# Patient Record
Sex: FEMALE | Race: WHITE | NOT HISPANIC OR LATINO | Employment: FULL TIME | URBAN - METROPOLITAN AREA
[De-identification: names, ages, dates, MRNs, and addresses within clinical notes are randomized per-mention and may not be internally consistent; named-entity substitution may affect disease eponyms.]

---

## 2017-01-04 ENCOUNTER — OFFICE VISIT - HEALTHEAST (OUTPATIENT)
Dept: FAMILY MEDICINE | Facility: CLINIC | Age: 22
End: 2017-01-04

## 2017-01-04 DIAGNOSIS — B34.9 VIRAL ILLNESS: ICD-10-CM

## 2017-01-07 ENCOUNTER — COMMUNICATION - HEALTHEAST (OUTPATIENT)
Dept: FAMILY MEDICINE | Facility: CLINIC | Age: 22
End: 2017-01-07

## 2017-01-12 ENCOUNTER — COMMUNICATION - HEALTHEAST (OUTPATIENT)
Dept: FAMILY MEDICINE | Facility: CLINIC | Age: 22
End: 2017-01-12

## 2017-01-13 ENCOUNTER — COMMUNICATION - HEALTHEAST (OUTPATIENT)
Dept: FAMILY MEDICINE | Facility: CLINIC | Age: 22
End: 2017-01-13

## 2017-01-13 ENCOUNTER — AMBULATORY - HEALTHEAST (OUTPATIENT)
Dept: FAMILY MEDICINE | Facility: CLINIC | Age: 22
End: 2017-01-13

## 2017-01-13 DIAGNOSIS — J20.9 ACUTE BRONCHITIS: ICD-10-CM

## 2017-02-14 ENCOUNTER — COMMUNICATION - HEALTHEAST (OUTPATIENT)
Dept: FAMILY MEDICINE | Facility: CLINIC | Age: 22
End: 2017-02-14

## 2017-02-17 ENCOUNTER — OFFICE VISIT - HEALTHEAST (OUTPATIENT)
Dept: FAMILY MEDICINE | Facility: CLINIC | Age: 22
End: 2017-02-17

## 2017-02-17 DIAGNOSIS — Z32.02 PREGNANCY TEST NEGATIVE: ICD-10-CM

## 2017-02-17 DIAGNOSIS — N92.6 MISSED PERIOD: ICD-10-CM

## 2017-04-07 ENCOUNTER — COMMUNICATION - HEALTHEAST (OUTPATIENT)
Dept: FAMILY MEDICINE | Facility: CLINIC | Age: 22
End: 2017-04-07

## 2017-04-07 ENCOUNTER — OFFICE VISIT - HEALTHEAST (OUTPATIENT)
Dept: FAMILY MEDICINE | Facility: CLINIC | Age: 22
End: 2017-04-07

## 2017-04-07 DIAGNOSIS — R17 JAUNDICE: ICD-10-CM

## 2017-04-07 DIAGNOSIS — R50.9 FEVER: ICD-10-CM

## 2017-04-07 DIAGNOSIS — R10.9 ABDOMINAL PAIN: ICD-10-CM

## 2017-04-07 DIAGNOSIS — J02.9 SORE THROAT: ICD-10-CM

## 2017-04-07 DIAGNOSIS — D58.0 SPHEROCYTOSIS (H): ICD-10-CM

## 2017-04-07 DIAGNOSIS — Q89.01 ASPLENIA: ICD-10-CM

## 2017-04-10 ENCOUNTER — COMMUNICATION - HEALTHEAST (OUTPATIENT)
Dept: FAMILY MEDICINE | Facility: CLINIC | Age: 22
End: 2017-04-10

## 2017-04-10 LAB
HAV IGM SERPL QL IA: NEGATIVE
HBV CORE IGM SERPL QL IA: NEGATIVE
HBV SURFACE AG SERPL QL IA: NEGATIVE
HCV AB SERPL QL IA: NEGATIVE

## 2017-04-14 ENCOUNTER — COMMUNICATION - HEALTHEAST (OUTPATIENT)
Dept: FAMILY MEDICINE | Facility: CLINIC | Age: 22
End: 2017-04-14

## 2017-05-31 ENCOUNTER — COMMUNICATION - HEALTHEAST (OUTPATIENT)
Dept: SCHEDULING | Facility: CLINIC | Age: 22
End: 2017-05-31

## 2017-06-06 ENCOUNTER — OFFICE VISIT - HEALTHEAST (OUTPATIENT)
Dept: FAMILY MEDICINE | Facility: CLINIC | Age: 22
End: 2017-06-06

## 2017-06-06 DIAGNOSIS — F41.1 GAD (GENERALIZED ANXIETY DISORDER): ICD-10-CM

## 2017-06-06 DIAGNOSIS — F32.9 MAJOR DEPRESSION: ICD-10-CM

## 2017-06-07 ENCOUNTER — COMMUNICATION - HEALTHEAST (OUTPATIENT)
Dept: FAMILY MEDICINE | Facility: CLINIC | Age: 22
End: 2017-06-07

## 2017-06-13 ENCOUNTER — COMMUNICATION - HEALTHEAST (OUTPATIENT)
Dept: FAMILY MEDICINE | Facility: CLINIC | Age: 22
End: 2017-06-13

## 2017-06-14 ENCOUNTER — COMMUNICATION - HEALTHEAST (OUTPATIENT)
Dept: FAMILY MEDICINE | Facility: CLINIC | Age: 22
End: 2017-06-14

## 2017-06-14 DIAGNOSIS — F41.1 GAD (GENERALIZED ANXIETY DISORDER): ICD-10-CM

## 2017-06-15 ENCOUNTER — COMMUNICATION - HEALTHEAST (OUTPATIENT)
Dept: FAMILY MEDICINE | Facility: CLINIC | Age: 22
End: 2017-06-15

## 2017-06-18 ENCOUNTER — COMMUNICATION - HEALTHEAST (OUTPATIENT)
Dept: FAMILY MEDICINE | Facility: CLINIC | Age: 22
End: 2017-06-18

## 2017-06-19 ENCOUNTER — COMMUNICATION - HEALTHEAST (OUTPATIENT)
Dept: FAMILY MEDICINE | Facility: CLINIC | Age: 22
End: 2017-06-19

## 2017-06-21 ENCOUNTER — OFFICE VISIT - HEALTHEAST (OUTPATIENT)
Dept: FAMILY MEDICINE | Facility: CLINIC | Age: 22
End: 2017-06-21

## 2017-06-21 ENCOUNTER — AMBULATORY - HEALTHEAST (OUTPATIENT)
Dept: FAMILY MEDICINE | Facility: CLINIC | Age: 22
End: 2017-06-21

## 2017-06-21 DIAGNOSIS — F41.9 ANXIETY: ICD-10-CM

## 2017-06-21 DIAGNOSIS — F41.0 PANIC DISORDER: ICD-10-CM

## 2017-06-21 DIAGNOSIS — Q89.01 ASPLENIA: ICD-10-CM

## 2017-06-21 DIAGNOSIS — D58.0 HEREDITARY SPHEROCYTOSIS (H): ICD-10-CM

## 2017-06-21 DIAGNOSIS — F41.1 GAD (GENERALIZED ANXIETY DISORDER): ICD-10-CM

## 2017-06-21 ASSESSMENT — MIFFLIN-ST. JEOR: SCORE: 1327.63

## 2017-07-07 ENCOUNTER — COMMUNICATION - HEALTHEAST (OUTPATIENT)
Dept: FAMILY MEDICINE | Facility: CLINIC | Age: 22
End: 2017-07-07

## 2017-07-07 ENCOUNTER — AMBULATORY - HEALTHEAST (OUTPATIENT)
Dept: FAMILY MEDICINE | Facility: CLINIC | Age: 22
End: 2017-07-07

## 2017-07-12 ENCOUNTER — COMMUNICATION - HEALTHEAST (OUTPATIENT)
Dept: FAMILY MEDICINE | Facility: CLINIC | Age: 22
End: 2017-07-12

## 2017-07-14 ENCOUNTER — OFFICE VISIT - HEALTHEAST (OUTPATIENT)
Dept: FAMILY MEDICINE | Facility: CLINIC | Age: 22
End: 2017-07-14

## 2017-07-14 DIAGNOSIS — F41.9 ANXIETY: ICD-10-CM

## 2017-07-14 DIAGNOSIS — F41.0 PANIC DISORDER: ICD-10-CM

## 2017-07-14 ASSESSMENT — MIFFLIN-ST. JEOR: SCORE: 1339.88

## 2017-07-25 ENCOUNTER — COMMUNICATION - HEALTHEAST (OUTPATIENT)
Dept: FAMILY MEDICINE | Facility: CLINIC | Age: 22
End: 2017-07-25

## 2017-08-01 ENCOUNTER — COMMUNICATION - HEALTHEAST (OUTPATIENT)
Dept: FAMILY MEDICINE | Facility: CLINIC | Age: 22
End: 2017-08-01

## 2017-08-02 ENCOUNTER — OFFICE VISIT - HEALTHEAST (OUTPATIENT)
Dept: FAMILY MEDICINE | Facility: CLINIC | Age: 22
End: 2017-08-02

## 2017-08-02 DIAGNOSIS — Z81.8 FAMILY HISTORY OF BIPOLAR DISORDER: ICD-10-CM

## 2017-08-02 DIAGNOSIS — F41.9 ANXIETY: ICD-10-CM

## 2017-08-02 ASSESSMENT — MIFFLIN-ST. JEOR: SCORE: 1317.2

## 2017-08-15 ENCOUNTER — COMMUNICATION - HEALTHEAST (OUTPATIENT)
Dept: ADMINISTRATIVE | Facility: CLINIC | Age: 22
End: 2017-08-15

## 2017-08-24 ENCOUNTER — OFFICE VISIT - HEALTHEAST (OUTPATIENT)
Dept: FAMILY MEDICINE | Facility: CLINIC | Age: 22
End: 2017-08-24

## 2017-08-24 DIAGNOSIS — F41.9 ANXIETY: ICD-10-CM

## 2017-08-24 ASSESSMENT — MIFFLIN-ST. JEOR: SCORE: 1329.68

## 2017-09-13 ENCOUNTER — OFFICE VISIT - HEALTHEAST (OUTPATIENT)
Dept: FAMILY MEDICINE | Facility: CLINIC | Age: 22
End: 2017-09-13

## 2017-09-13 DIAGNOSIS — Z30.9 CONTRACEPTION MANAGEMENT: ICD-10-CM

## 2017-09-13 DIAGNOSIS — D72.829 LEUKOCYTOSIS: ICD-10-CM

## 2017-09-13 DIAGNOSIS — Z23 NEED FOR VACCINATION: ICD-10-CM

## 2017-09-13 DIAGNOSIS — Z00.129 ROUTINE INFANT OR CHILD HEALTH CHECK: ICD-10-CM

## 2017-09-13 DIAGNOSIS — F41.9 ANXIETY: ICD-10-CM

## 2017-09-13 DIAGNOSIS — R74.8 ELEVATED LIVER ENZYMES: ICD-10-CM

## 2017-09-13 DIAGNOSIS — D75.839 THROMBOCYTOSIS: ICD-10-CM

## 2017-09-13 DIAGNOSIS — N97.9 FEMALE INFERTILITY: ICD-10-CM

## 2017-09-13 DIAGNOSIS — Z13.220 LIPID SCREENING: ICD-10-CM

## 2017-09-13 LAB
CHOLEST SERPL-MCNC: 222 MG/DL
FASTING STATUS PATIENT QL REPORTED: NO
HDLC SERPL-MCNC: 56 MG/DL
LDLC SERPL CALC-MCNC: 155 MG/DL
TRIGL SERPL-MCNC: 57 MG/DL

## 2017-09-13 ASSESSMENT — MIFFLIN-ST. JEOR: SCORE: 1328.77

## 2017-09-15 ENCOUNTER — COMMUNICATION - HEALTHEAST (OUTPATIENT)
Dept: FAMILY MEDICINE | Facility: CLINIC | Age: 22
End: 2017-09-15

## 2017-09-21 ENCOUNTER — COMMUNICATION - HEALTHEAST (OUTPATIENT)
Dept: FAMILY MEDICINE | Facility: CLINIC | Age: 22
End: 2017-09-21

## 2017-09-27 ENCOUNTER — COMMUNICATION - HEALTHEAST (OUTPATIENT)
Dept: FAMILY MEDICINE | Facility: CLINIC | Age: 22
End: 2017-09-27

## 2017-09-28 ENCOUNTER — COMMUNICATION - HEALTHEAST (OUTPATIENT)
Dept: SCHEDULING | Facility: CLINIC | Age: 22
End: 2017-09-28

## 2017-10-10 ENCOUNTER — COMMUNICATION - HEALTHEAST (OUTPATIENT)
Dept: FAMILY MEDICINE | Facility: CLINIC | Age: 22
End: 2017-10-10

## 2017-10-10 DIAGNOSIS — F41.9 ANXIETY: ICD-10-CM

## 2017-10-23 ENCOUNTER — OFFICE VISIT - HEALTHEAST (OUTPATIENT)
Dept: FAMILY MEDICINE | Facility: CLINIC | Age: 22
End: 2017-10-23

## 2017-10-23 DIAGNOSIS — J01.90 ACUTE SINUSITIS: ICD-10-CM

## 2017-10-23 ASSESSMENT — MIFFLIN-ST. JEOR: SCORE: 1365.96

## 2017-10-26 ENCOUNTER — COMMUNICATION - HEALTHEAST (OUTPATIENT)
Dept: FAMILY MEDICINE | Facility: CLINIC | Age: 22
End: 2017-10-26

## 2017-10-26 ENCOUNTER — AMBULATORY - HEALTHEAST (OUTPATIENT)
Dept: FAMILY MEDICINE | Facility: CLINIC | Age: 22
End: 2017-10-26

## 2017-10-30 ENCOUNTER — OFFICE VISIT - HEALTHEAST (OUTPATIENT)
Dept: FAMILY MEDICINE | Facility: CLINIC | Age: 22
End: 2017-10-30

## 2017-10-30 DIAGNOSIS — F41.1 GAD (GENERALIZED ANXIETY DISORDER): ICD-10-CM

## 2017-10-30 DIAGNOSIS — G43.009 NONINTRACTABLE MIGRAINE: ICD-10-CM

## 2017-10-30 DIAGNOSIS — F41.0 PANIC DISORDER: ICD-10-CM

## 2017-11-08 ENCOUNTER — COMMUNICATION - HEALTHEAST (OUTPATIENT)
Dept: FAMILY MEDICINE | Facility: CLINIC | Age: 22
End: 2017-11-08

## 2017-11-08 DIAGNOSIS — F41.9 ANXIETY: ICD-10-CM

## 2017-11-09 ENCOUNTER — COMMUNICATION - HEALTHEAST (OUTPATIENT)
Dept: FAMILY MEDICINE | Facility: CLINIC | Age: 22
End: 2017-11-09

## 2017-11-09 DIAGNOSIS — F41.9 ANXIETY: ICD-10-CM

## 2017-11-10 ENCOUNTER — COMMUNICATION - HEALTHEAST (OUTPATIENT)
Dept: FAMILY MEDICINE | Facility: CLINIC | Age: 22
End: 2017-11-10

## 2017-11-10 DIAGNOSIS — F41.9 ANXIETY: ICD-10-CM

## 2017-11-13 ENCOUNTER — OFFICE VISIT - HEALTHEAST (OUTPATIENT)
Dept: FAMILY MEDICINE | Facility: CLINIC | Age: 22
End: 2017-11-13

## 2017-11-13 DIAGNOSIS — F41.9 ANXIETY: ICD-10-CM

## 2017-11-13 DIAGNOSIS — Z00.00 PREVENTATIVE HEALTH CARE: ICD-10-CM

## 2017-11-13 DIAGNOSIS — R20.0 NUMBNESS AND TINGLING IN RIGHT HAND: ICD-10-CM

## 2017-11-13 DIAGNOSIS — R20.2 NUMBNESS AND TINGLING IN RIGHT HAND: ICD-10-CM

## 2017-11-13 DIAGNOSIS — F41.1 GAD (GENERALIZED ANXIETY DISORDER): ICD-10-CM

## 2017-11-13 DIAGNOSIS — F41.0 PANIC DISORDER: ICD-10-CM

## 2017-11-14 ENCOUNTER — RECORDS - HEALTHEAST (OUTPATIENT)
Dept: ADMINISTRATIVE | Facility: OTHER | Age: 22
End: 2017-11-14

## 2017-12-04 ENCOUNTER — OFFICE VISIT - HEALTHEAST (OUTPATIENT)
Dept: FAMILY MEDICINE | Facility: CLINIC | Age: 22
End: 2017-12-04

## 2017-12-04 DIAGNOSIS — F41.0 PANIC DISORDER: ICD-10-CM

## 2017-12-04 DIAGNOSIS — D58.0 HEREDITARY SPHEROCYTOSIS (H): ICD-10-CM

## 2017-12-04 DIAGNOSIS — M25.539 WRIST PAIN: ICD-10-CM

## 2017-12-04 DIAGNOSIS — F41.1 GAD (GENERALIZED ANXIETY DISORDER): ICD-10-CM

## 2017-12-14 ENCOUNTER — COMMUNICATION - HEALTHEAST (OUTPATIENT)
Dept: FAMILY MEDICINE | Facility: CLINIC | Age: 22
End: 2017-12-14

## 2017-12-14 DIAGNOSIS — F41.9 ANXIETY: ICD-10-CM

## 2017-12-26 ENCOUNTER — OFFICE VISIT - HEALTHEAST (OUTPATIENT)
Dept: FAMILY MEDICINE | Facility: CLINIC | Age: 22
End: 2017-12-26

## 2017-12-26 DIAGNOSIS — R68.89 FLU-LIKE SYMPTOMS: ICD-10-CM

## 2017-12-26 DIAGNOSIS — R07.0 THROAT PAIN: ICD-10-CM

## 2017-12-27 ENCOUNTER — COMMUNICATION - HEALTHEAST (OUTPATIENT)
Dept: FAMILY MEDICINE | Facility: CLINIC | Age: 22
End: 2017-12-27

## 2017-12-27 DIAGNOSIS — R50.9 FEVER: ICD-10-CM

## 2017-12-27 DIAGNOSIS — F41.1 GAD (GENERALIZED ANXIETY DISORDER): ICD-10-CM

## 2017-12-27 DIAGNOSIS — F41.9 ANXIETY: ICD-10-CM

## 2018-01-24 ENCOUNTER — OFFICE VISIT - HEALTHEAST (OUTPATIENT)
Dept: FAMILY MEDICINE | Facility: CLINIC | Age: 23
End: 2018-01-24

## 2018-01-24 DIAGNOSIS — F41.1 GAD (GENERALIZED ANXIETY DISORDER): ICD-10-CM

## 2018-01-24 DIAGNOSIS — F41.9 ANXIETY: ICD-10-CM

## 2018-01-24 DIAGNOSIS — T75.3XXA MOTION SICKNESS: ICD-10-CM

## 2018-01-24 DIAGNOSIS — Z30.9 CONTRACEPTION MANAGEMENT: ICD-10-CM

## 2018-01-29 ENCOUNTER — COMMUNICATION - HEALTHEAST (OUTPATIENT)
Dept: FAMILY MEDICINE | Facility: CLINIC | Age: 23
End: 2018-01-29

## 2018-02-09 ENCOUNTER — OFFICE VISIT - HEALTHEAST (OUTPATIENT)
Dept: FAMILY MEDICINE | Facility: CLINIC | Age: 23
End: 2018-02-09

## 2018-02-09 DIAGNOSIS — N92.6 MISSED MENSES: ICD-10-CM

## 2018-02-09 LAB — HCG UR QL: POSITIVE

## 2018-02-11 ENCOUNTER — COMMUNICATION - HEALTHEAST (OUTPATIENT)
Dept: FAMILY MEDICINE | Facility: CLINIC | Age: 23
End: 2018-02-11

## 2018-02-14 ENCOUNTER — TRANSFERRED RECORDS (OUTPATIENT)
Dept: HEALTH INFORMATION MANAGEMENT | Facility: CLINIC | Age: 23
End: 2018-02-14

## 2018-02-14 ENCOUNTER — HOSPITAL ENCOUNTER (INPATIENT)
Facility: CLINIC | Age: 23
LOS: 3 days | Discharge: HOME | End: 2018-02-17
Attending: INTERNAL MEDICINE | Admitting: INTERNAL MEDICINE
Payer: COMMERCIAL

## 2018-02-14 DIAGNOSIS — R11.0 NAUSEA: Primary | ICD-10-CM

## 2018-02-14 PROBLEM — R00.0 TACHYCARDIA: Status: ACTIVE | Noted: 2018-02-14

## 2018-02-14 LAB
ALBUMIN SERPL-MCNC: 3.2 G/DL (ref 3.4–5)
ALP SERPL-CCNC: 76 U/L (ref 40–150)
ALT SERPL W P-5'-P-CCNC: 285 U/L (ref 0–50)
ANION GAP SERPL CALCULATED.3IONS-SCNC: 10 MMOL/L (ref 3–14)
AST SERPL W P-5'-P-CCNC: 486 U/L (ref 0–45)
BASOPHILS # BLD AUTO: 0 10E9/L (ref 0–0.2)
BASOPHILS NFR BLD AUTO: 0.2 %
BILIRUB SERPL-MCNC: 1.5 MG/DL (ref 0.2–1.3)
BUN SERPL-MCNC: 7 MG/DL (ref 7–30)
CALCIUM SERPL-MCNC: 8 MG/DL (ref 8.5–10.1)
CHLORIDE SERPL-SCNC: 103 MMOL/L (ref 94–109)
CO2 SERPL-SCNC: 23 MMOL/L (ref 20–32)
CREAT SERPL-MCNC: 0.51 MG/DL (ref 0.52–1.04)
DIFFERENTIAL METHOD BLD: ABNORMAL
EOSINOPHIL # BLD AUTO: 0 10E9/L (ref 0–0.7)
EOSINOPHIL NFR BLD AUTO: 0 %
ERYTHROCYTE [DISTWIDTH] IN BLOOD BY AUTOMATED COUNT: 12.6 % (ref 10–15)
GFR SERPL CREATININE-BSD FRML MDRD: >90 ML/MIN/1.7M2
GLUCOSE SERPL-MCNC: 99 MG/DL (ref 70–99)
HCT VFR BLD AUTO: 34.2 % (ref 35–47)
HGB BLD-MCNC: 12.2 G/DL (ref 11.7–15.7)
IMM GRANULOCYTES # BLD: 0.1 10E9/L (ref 0–0.4)
IMM GRANULOCYTES NFR BLD: 0.5 %
INR PPP: 1.1 (ref 0.86–1.14)
LIPASE SERPL-CCNC: 56 U/L (ref 73–393)
LYMPHOCYTES # BLD AUTO: 0.8 10E9/L (ref 0.8–5.3)
LYMPHOCYTES NFR BLD AUTO: 6.3 %
MCH RBC QN AUTO: 31 PG (ref 26.5–33)
MCHC RBC AUTO-ENTMCNC: 35.7 G/DL (ref 31.5–36.5)
MCV RBC AUTO: 87 FL (ref 78–100)
MONOCYTES # BLD AUTO: 0.7 10E9/L (ref 0–1.3)
MONOCYTES NFR BLD AUTO: 5.8 %
NEUTROPHILS # BLD AUTO: 11.1 10E9/L (ref 1.6–8.3)
NEUTROPHILS NFR BLD AUTO: 87.2 %
NRBC # BLD AUTO: 0 10*3/UL
NRBC BLD AUTO-RTO: 0 /100
PLATELET # BLD AUTO: 454 10E9/L (ref 150–450)
POTASSIUM SERPL-SCNC: 3.2 MMOL/L (ref 3.4–5.3)
PROT SERPL-MCNC: 6.8 G/DL (ref 6.8–8.8)
RBC # BLD AUTO: 3.94 10E12/L (ref 3.8–5.2)
SODIUM SERPL-SCNC: 136 MMOL/L (ref 133–144)
WBC # BLD AUTO: 12.7 10E9/L (ref 4–11)

## 2018-02-14 PROCEDURE — 86704 HEP B CORE ANTIBODY TOTAL: CPT | Performed by: STUDENT IN AN ORGANIZED HEALTH CARE EDUCATION/TRAINING PROGRAM

## 2018-02-14 PROCEDURE — 12000008 ZZH R&B INTERMEDIATE UMMC

## 2018-02-14 PROCEDURE — 36415 COLL VENOUS BLD VENIPUNCTURE: CPT | Performed by: INTERNAL MEDICINE

## 2018-02-14 PROCEDURE — 87040 BLOOD CULTURE FOR BACTERIA: CPT | Performed by: STUDENT IN AN ORGANIZED HEALTH CARE EDUCATION/TRAINING PROGRAM

## 2018-02-14 PROCEDURE — 85025 COMPLETE CBC W/AUTO DIFF WBC: CPT | Performed by: STUDENT IN AN ORGANIZED HEALTH CARE EDUCATION/TRAINING PROGRAM

## 2018-02-14 PROCEDURE — 85610 PROTHROMBIN TIME: CPT | Performed by: STUDENT IN AN ORGANIZED HEALTH CARE EDUCATION/TRAINING PROGRAM

## 2018-02-14 PROCEDURE — 25000128 H RX IP 250 OP 636: Performed by: STUDENT IN AN ORGANIZED HEALTH CARE EDUCATION/TRAINING PROGRAM

## 2018-02-14 PROCEDURE — 86706 HEP B SURFACE ANTIBODY: CPT | Performed by: STUDENT IN AN ORGANIZED HEALTH CARE EDUCATION/TRAINING PROGRAM

## 2018-02-14 PROCEDURE — 99223 1ST HOSP IP/OBS HIGH 75: CPT | Performed by: INTERNAL MEDICINE

## 2018-02-14 PROCEDURE — 83735 ASSAY OF MAGNESIUM: CPT | Performed by: STUDENT IN AN ORGANIZED HEALTH CARE EDUCATION/TRAINING PROGRAM

## 2018-02-14 PROCEDURE — 82248 BILIRUBIN DIRECT: CPT | Performed by: STUDENT IN AN ORGANIZED HEALTH CARE EDUCATION/TRAINING PROGRAM

## 2018-02-14 PROCEDURE — 99207 ZZC CDG-CHARGE REQUIRED MANUAL ENTRY: CPT | Performed by: INTERNAL MEDICINE

## 2018-02-14 PROCEDURE — 36415 COLL VENOUS BLD VENIPUNCTURE: CPT | Performed by: STUDENT IN AN ORGANIZED HEALTH CARE EDUCATION/TRAINING PROGRAM

## 2018-02-14 PROCEDURE — 86803 HEPATITIS C AB TEST: CPT | Performed by: STUDENT IN AN ORGANIZED HEALTH CARE EDUCATION/TRAINING PROGRAM

## 2018-02-14 PROCEDURE — 80329 ANALGESICS NON-OPIOID 1 OR 2: CPT | Performed by: STUDENT IN AN ORGANIZED HEALTH CARE EDUCATION/TRAINING PROGRAM

## 2018-02-14 PROCEDURE — 83615 LACTATE (LD) (LDH) ENZYME: CPT | Performed by: STUDENT IN AN ORGANIZED HEALTH CARE EDUCATION/TRAINING PROGRAM

## 2018-02-14 PROCEDURE — 87389 HIV-1 AG W/HIV-1&-2 AB AG IA: CPT | Performed by: STUDENT IN AN ORGANIZED HEALTH CARE EDUCATION/TRAINING PROGRAM

## 2018-02-14 PROCEDURE — 83605 ASSAY OF LACTIC ACID: CPT | Performed by: INTERNAL MEDICINE

## 2018-02-14 PROCEDURE — 83690 ASSAY OF LIPASE: CPT | Performed by: STUDENT IN AN ORGANIZED HEALTH CARE EDUCATION/TRAINING PROGRAM

## 2018-02-14 PROCEDURE — 87340 HEPATITIS B SURFACE AG IA: CPT | Performed by: STUDENT IN AN ORGANIZED HEALTH CARE EDUCATION/TRAINING PROGRAM

## 2018-02-14 PROCEDURE — 85045 AUTOMATED RETICULOCYTE COUNT: CPT | Performed by: STUDENT IN AN ORGANIZED HEALTH CARE EDUCATION/TRAINING PROGRAM

## 2018-02-14 PROCEDURE — 80053 COMPREHEN METABOLIC PANEL: CPT | Performed by: STUDENT IN AN ORGANIZED HEALTH CARE EDUCATION/TRAINING PROGRAM

## 2018-02-14 RX ORDER — MAGNESIUM SULFATE HEPTAHYDRATE 40 MG/ML
4 INJECTION, SOLUTION INTRAVENOUS EVERY 4 HOURS PRN
Status: DISCONTINUED | OUTPATIENT
Start: 2018-02-14 | End: 2018-02-17 | Stop reason: HOSPADM

## 2018-02-14 RX ORDER — ACETAMINOPHEN 325 MG/1
650 TABLET ORAL EVERY 4 HOURS PRN
Status: DISCONTINUED | OUTPATIENT
Start: 2018-02-14 | End: 2018-02-14

## 2018-02-14 RX ORDER — NALOXONE HYDROCHLORIDE 0.4 MG/ML
.1-.4 INJECTION, SOLUTION INTRAMUSCULAR; INTRAVENOUS; SUBCUTANEOUS
Status: DISCONTINUED | OUTPATIENT
Start: 2018-02-14 | End: 2018-02-17 | Stop reason: HOSPADM

## 2018-02-14 RX ORDER — ONDANSETRON 2 MG/ML
4 INJECTION INTRAMUSCULAR; INTRAVENOUS EVERY 6 HOURS PRN
Status: DISCONTINUED | OUTPATIENT
Start: 2018-02-14 | End: 2018-02-16

## 2018-02-14 RX ORDER — POTASSIUM CHLORIDE 1.5 G/1.58G
20-40 POWDER, FOR SOLUTION ORAL
Status: DISCONTINUED | OUTPATIENT
Start: 2018-02-14 | End: 2018-02-17 | Stop reason: HOSPADM

## 2018-02-14 RX ORDER — POTASSIUM CL/LIDO/0.9 % NACL 10MEQ/0.1L
10 INTRAVENOUS SOLUTION, PIGGYBACK (ML) INTRAVENOUS
Status: DISCONTINUED | OUTPATIENT
Start: 2018-02-14 | End: 2018-02-17 | Stop reason: HOSPADM

## 2018-02-14 RX ORDER — HYDROMORPHONE HYDROCHLORIDE 1 MG/ML
.5-1 INJECTION, SOLUTION INTRAMUSCULAR; INTRAVENOUS; SUBCUTANEOUS
Status: DISCONTINUED | OUTPATIENT
Start: 2018-02-14 | End: 2018-02-16

## 2018-02-14 RX ORDER — POTASSIUM CHLORIDE 7.45 MG/ML
10 INJECTION INTRAVENOUS
Status: DISCONTINUED | OUTPATIENT
Start: 2018-02-14 | End: 2018-02-17 | Stop reason: HOSPADM

## 2018-02-14 RX ORDER — POTASSIUM CHLORIDE 750 MG/1
20-40 TABLET, EXTENDED RELEASE ORAL
Status: DISCONTINUED | OUTPATIENT
Start: 2018-02-14 | End: 2018-02-17 | Stop reason: HOSPADM

## 2018-02-14 RX ORDER — NALOXONE HYDROCHLORIDE 0.4 MG/ML
.1-.4 INJECTION, SOLUTION INTRAMUSCULAR; INTRAVENOUS; SUBCUTANEOUS
Status: DISCONTINUED | OUTPATIENT
Start: 2018-02-14 | End: 2018-02-14

## 2018-02-14 RX ORDER — OXYCODONE HYDROCHLORIDE 5 MG/1
5 TABLET ORAL EVERY 4 HOURS PRN
Status: DISCONTINUED | OUTPATIENT
Start: 2018-02-14 | End: 2018-02-15

## 2018-02-14 RX ADMIN — Medication 0.5 MG: at 22:38

## 2018-02-14 NOTE — IP AVS SNAPSHOT
MRN:[id number]                      After Visit Summary   2/14/2018    Tonya Thayer    MRN: [id number]           Thank you!     Thank you for choosing [facility] for your care. Our goal is always to provide you with excellent care. Hearing back from our patients is one way we can continue to improve our services. Please take a few minutes to complete the written survey that you may receive in the mail after you visit with us. Thank you!        Patient Information     Date Of Birth          1995        Designated Caregiver       Most Recent Value    Caregiver    Will someone help with your care after discharge? yes    Name of designated caregiver Mohinder [he is her SO]    Phone number of caregiver [phone]    Caregiver address same as pt       About your hospital stay     You were admitted on:  February 14, 2018 You last received care in the:  [facility]    You were discharged on:  February 17, 2018        Reason for your hospital stay       Abdominal and back pain, nausea, fever, abnormal liver tests, in the setting of pregnancy.  All symptoms, as well as abnormal lab tests, have improved. It is not clear if your illness was related to a viral illness or the pregnancy.                  Who to Call     For medical emergencies, please call 911.  For non-urgent questions about your medical care, please call your primary care provider or clinic, None          Attending Provider     Provider Specialty    Oralia Sánchez MD Internal Medicine       Primary Care Provider    None Specified      After Care Instructions     Diet       Follow this diet upon discharge: Orders Placed This Encounter      Regular Diet Adult                  Follow-up Appointments     Follow Up and recommended labs and tests       See your obstetrics provider in 1 week  Call your Doctor if you experience worsening abdominal pain, fever, heavy vaginal bleeding or worsening back pain                  Pending Results   "   Date and Time Order Name Status Description    2/15/2018 0215 Drug Abuse Scr  Ur w Qnt Reflx In process     2018 Blood culture Preliminary     2018 Blood culture Preliminary             Statement of Approval     Ordered          18  I have reviewed and agree with all the recommendations and orders detailed in this document.  EFFECTIVE NOW     Approved and electronically signed by:  Luisito Painting MD           18 09  I have reviewed and agree with all the recommendations and orders detailed in this document.     Approved and electronically signed by:  Luisito Painting MD             Admission Information     Date & Time Provider Department Dept. Phone    2018 Oralia Sánchez MD [facility] [phone]      Your Vitals Were     Blood Pressure Pulse Temperature Respirations Height Weight    114/56 (BP Location: Left arm) 92 96.8  F (36  C) (Oral) 18 1.6 m (5' 3\") 68.5 kg (151 lb)    Last Period Pulse Oximetry BMI (Body Mass Index)             2017 99% 26.75 kg/m2         Online Prasad Information     Online Prasad lets you send messages to your doctor, view your test results, renew your prescriptions, schedule appointments and more. To sign up, go to [email or web address] . Click on \"Log in\" on the left side of the screen, which will take you to the Welcome page. Then click on \"Sign up Now\" on the right side of the page.     You will be asked to enter the access code listed below, as well as some personal information. Please follow the directions to create your username and password.     Your access code is: [email or web address]  Expires: 2018 11:37 AM     Your access code will  in 90 days. If you need help or a new code, please call your [facility] clinic or [phone].        Care EveryWhere ID     This is your Care EveryWhere ID. This could be used by other organizations to access your [facility] medical records  [id number]        Equal Access " to Services     [facility] : Rohit Vital, wagabrielleda luqadaha, qaybta kaalmada ivon, maddie blanchard. So [facility] [phone].    ATENCIÓN: Si habla español, tiene a carl disposición servicios gratuitos de asistencia lingüística. Llame al [phone].    We comply with applicable federal civil rights laws and [place] laws. We do not discriminate on the basis of race, color, national origin, age, disability, sex, sexual orientation, or gender identity.               Review of your medicines      START taking        Dose / Directions    ondansetron 4 MG ODT tab   Commonly known as:  ZOFRAN ODT   Used for:  Nausea        Dose:  4 mg   Take 1 tablet (4 mg) by mouth every 6 hours as needed for nausea   Quantity:  12 tablet   Refills:  1            Where to get your medicines      These medications were sent to [address] [address]     Phone:  [phone]     ondansetron 4 MG ODT tab                Protect others around you: Learn how to safely use, store and throw away your medicines at www.disposemymeds.org.             Medication List: This is a list of all your medications and when to take them. Check marks below indicate your daily home schedule. Keep this list as a reference.      Medications           Morning Afternoon Evening Bedtime As Needed    ondansetron 4 MG ODT tab   Commonly known as:  ZOFRAN ODT   Take 1 tablet (4 mg) by mouth every 6 hours as needed for nausea

## 2018-02-14 NOTE — IP AVS SNAPSHOT
[facility] [address]    [address] [place] [address]    Phone:  [phone]                                       After Visit Summary   2/14/2018    Tonya Thayer    MRN: [id number]           After Visit Summary Signature Page     I have received my discharge instructions, and my questions have been answered. I have discussed any challenges I see with this plan with the nurse or doctor.    ..........................................................................................................................................  Patient/Patient Representative Signature      ..........................................................................................................................................  Patient Representative Print Name and Relationship to Patient    ..................................................               ................................................  Date                                            Time    ..........................................................................................................................................  Reviewed by Signature/Title    ...................................................              ..............................................  Date                                                            Time

## 2018-02-15 ENCOUNTER — APPOINTMENT (OUTPATIENT)
Dept: ULTRASOUND IMAGING | Facility: CLINIC | Age: 23
End: 2018-02-15
Attending: OBSTETRICS & GYNECOLOGY
Payer: COMMERCIAL

## 2018-02-15 ENCOUNTER — APPOINTMENT (OUTPATIENT)
Dept: ULTRASOUND IMAGING | Facility: CLINIC | Age: 23
End: 2018-02-15
Attending: STUDENT IN AN ORGANIZED HEALTH CARE EDUCATION/TRAINING PROGRAM
Payer: COMMERCIAL

## 2018-02-15 LAB
ALBUMIN SERPL-MCNC: 3.1 G/DL (ref 3.4–5)
ALP SERPL-CCNC: 83 U/L (ref 40–150)
ALT SERPL W P-5'-P-CCNC: 294 U/L (ref 0–50)
ANION GAP SERPL CALCULATED.3IONS-SCNC: 11 MMOL/L (ref 3–14)
APAP SERPL-MCNC: 8 MG/L (ref 10–20)
AST SERPL W P-5'-P-CCNC: 246 U/L (ref 0–45)
BILIRUB DIRECT SERPL-MCNC: 0.5 MG/DL (ref 0–0.2)
BILIRUB DIRECT SERPL-MCNC: 0.6 MG/DL (ref 0–0.2)
BILIRUB SERPL-MCNC: 1.1 MG/DL (ref 0.2–1.3)
BUN SERPL-MCNC: 6 MG/DL (ref 7–30)
CALCIUM SERPL-MCNC: 7.7 MG/DL (ref 8.5–10.1)
CHLORIDE SERPL-SCNC: 105 MMOL/L (ref 94–109)
CO2 SERPL-SCNC: 21 MMOL/L (ref 20–32)
CREAT SERPL-MCNC: 0.44 MG/DL (ref 0.52–1.04)
GFR SERPL CREATININE-BSD FRML MDRD: >90 ML/MIN/1.7M2
GLUCOSE SERPL-MCNC: 68 MG/DL (ref 70–99)
HBV CORE AB SERPL QL IA: NONREACTIVE
HBV SURFACE AB SERPL IA-ACNC: 21.08 M[IU]/ML
HBV SURFACE AG SERPL QL IA: NONREACTIVE
HCV AB SERPL QL IA: NONREACTIVE
HIV 1+2 AB+HIV1 P24 AG SERPL QL IA: NONREACTIVE
LACTATE BLD-SCNC: 0.4 MMOL/L (ref 0.4–1.9)
LDH SERPL L TO P-CCNC: 395 U/L (ref 81–234)
LIPASE SERPL-CCNC: 66 U/L (ref 73–393)
MAGNESIUM SERPL-MCNC: 1.8 MG/DL (ref 1.6–2.3)
MAGNESIUM SERPL-MCNC: 2 MG/DL (ref 1.6–2.3)
POTASSIUM SERPL-SCNC: 3.8 MMOL/L (ref 3.4–5.3)
PROT SERPL-MCNC: 6.6 G/DL (ref 6.8–8.8)
RETICS # AUTO: 94.5 10E9/L (ref 25–95)
RETICS/RBC NFR AUTO: 2.4 % (ref 0.5–2)
SALICYLATES SERPL-MCNC: <2 MG/DL
SODIUM SERPL-SCNC: 137 MMOL/L (ref 133–144)

## 2018-02-15 PROCEDURE — 83690 ASSAY OF LIPASE: CPT | Performed by: INTERNAL MEDICINE

## 2018-02-15 PROCEDURE — 12000008 ZZH R&B INTERMEDIATE UMMC

## 2018-02-15 PROCEDURE — 76817 TRANSVAGINAL US OBSTETRIC: CPT

## 2018-02-15 PROCEDURE — 25000128 H RX IP 250 OP 636: Performed by: INTERNAL MEDICINE

## 2018-02-15 PROCEDURE — 99233 SBSQ HOSP IP/OBS HIGH 50: CPT | Performed by: INTERNAL MEDICINE

## 2018-02-15 PROCEDURE — 25000132 ZZH RX MED GY IP 250 OP 250 PS 637: Performed by: STUDENT IN AN ORGANIZED HEALTH CARE EDUCATION/TRAINING PROGRAM

## 2018-02-15 PROCEDURE — 76700 US EXAM ABDOM COMPLETE: CPT

## 2018-02-15 PROCEDURE — 80307 DRUG TEST PRSMV CHEM ANLYZR: CPT | Performed by: STUDENT IN AN ORGANIZED HEALTH CARE EDUCATION/TRAINING PROGRAM

## 2018-02-15 PROCEDURE — 36415 COLL VENOUS BLD VENIPUNCTURE: CPT | Performed by: INTERNAL MEDICINE

## 2018-02-15 PROCEDURE — 80053 COMPREHEN METABOLIC PANEL: CPT | Performed by: INTERNAL MEDICINE

## 2018-02-15 PROCEDURE — 87491 CHLMYD TRACH DNA AMP PROBE: CPT | Performed by: STUDENT IN AN ORGANIZED HEALTH CARE EDUCATION/TRAINING PROGRAM

## 2018-02-15 PROCEDURE — 25000128 H RX IP 250 OP 636: Performed by: STUDENT IN AN ORGANIZED HEALTH CARE EDUCATION/TRAINING PROGRAM

## 2018-02-15 PROCEDURE — 25000128 H RX IP 250 OP 636

## 2018-02-15 PROCEDURE — 87591 N.GONORRHOEAE DNA AMP PROB: CPT | Performed by: STUDENT IN AN ORGANIZED HEALTH CARE EDUCATION/TRAINING PROGRAM

## 2018-02-15 RX ORDER — SODIUM CHLORIDE 9 MG/ML
INJECTION, SOLUTION INTRAVENOUS
Status: COMPLETED
Start: 2018-02-15 | End: 2018-02-15

## 2018-02-15 RX ORDER — FOLIC ACID 1 MG/1
1 TABLET ORAL DAILY
Status: DISCONTINUED | OUTPATIENT
Start: 2018-02-15 | End: 2018-02-17 | Stop reason: HOSPADM

## 2018-02-15 RX ORDER — HYDROMORPHONE HYDROCHLORIDE 2 MG/1
2-4 TABLET ORAL
Status: DISCONTINUED | OUTPATIENT
Start: 2018-02-15 | End: 2018-02-17 | Stop reason: HOSPADM

## 2018-02-15 RX ORDER — SODIUM CHLORIDE 9 MG/ML
INJECTION, SOLUTION INTRAVENOUS CONTINUOUS
Status: DISCONTINUED | OUTPATIENT
Start: 2018-02-15 | End: 2018-02-17

## 2018-02-15 RX ADMIN — Medication 0.5 MG: at 00:40

## 2018-02-15 RX ADMIN — Medication 0.5 MG: at 11:13

## 2018-02-15 RX ADMIN — Medication 0.5 MG: at 12:39

## 2018-02-15 RX ADMIN — Medication 1000 ML: at 01:32

## 2018-02-15 RX ADMIN — SODIUM CHLORIDE: 9 INJECTION, SOLUTION INTRAVENOUS at 15:02

## 2018-02-15 RX ADMIN — HYDROMORPHONE HYDROCHLORIDE 2 MG: 2 TABLET ORAL at 16:38

## 2018-02-15 RX ADMIN — ONDANSETRON 4 MG: 2 INJECTION INTRAMUSCULAR; INTRAVENOUS at 03:43

## 2018-02-15 RX ADMIN — POTASSIUM CHLORIDE 20 MEQ: 750 TABLET, FILM COATED, EXTENDED RELEASE ORAL at 04:25

## 2018-02-15 RX ADMIN — Medication 0.5 MG: at 21:55

## 2018-02-15 RX ADMIN — ONDANSETRON 4 MG: 2 INJECTION INTRAMUSCULAR; INTRAVENOUS at 12:00

## 2018-02-15 RX ADMIN — Medication 0.5 MG: at 08:34

## 2018-02-15 RX ADMIN — Medication 0.5 MG: at 18:04

## 2018-02-15 RX ADMIN — Medication 0.5 MG: at 15:28

## 2018-02-15 RX ADMIN — ONDANSETRON 4 MG: 2 INJECTION INTRAMUSCULAR; INTRAVENOUS at 18:04

## 2018-02-15 RX ADMIN — Medication 0.5 MG: at 19:56

## 2018-02-15 RX ADMIN — Medication 0.5 MG: at 04:28

## 2018-02-15 RX ADMIN — POTASSIUM CHLORIDE 20 MEQ: 750 TABLET, FILM COATED, EXTENDED RELEASE ORAL at 11:13

## 2018-02-15 RX ADMIN — Medication 0.5 MG: at 14:06

## 2018-02-15 RX ADMIN — FOLIC ACID 1 MG: 1 TABLET ORAL at 11:13

## 2018-02-15 RX ADMIN — POTASSIUM CHLORIDE 40 MEQ: 750 TABLET, FILM COATED, EXTENDED RELEASE ORAL at 01:31

## 2018-02-15 RX ADMIN — SODIUM CHLORIDE 1000 ML: 9 INJECTION, SOLUTION INTRAVENOUS at 01:32

## 2018-02-15 RX ADMIN — HYDROMORPHONE HYDROCHLORIDE 4 MG: 2 TABLET ORAL at 03:43

## 2018-02-15 ASSESSMENT — ACTIVITIES OF DAILY LIVING (ADL)
ADLS_ACUITY_SCORE: 17

## 2018-02-15 NOTE — H&P
"[facility]    Internal Medicine History and Physical        Date of Admission:  2018    Chief Complaint   Back and abdominal pain    History is obtained from the patient    History of Present Illness   Tonya Thayer is a 22 year old  (9 weeks pregnant, LMP Dec 20th) female who has a history of hereditary spherocytosis s/p splenectomy () who is admitted from OSH ED for back and abdominal pain as well as nausea and vomiting.     Pt states that sx of back pain started last evening and worsening progressively overnight. Back pain described as generalized over all of lower back and wrapped around to hips and down to buttocks. She describes it as a sharp aching pain that is constant and severe (\"worse than pyelonephritis\"). No new rashes or swelling in the back area. Back pain continued to progress and she developed dizziness to point where she thought she would fall. She then started to try to eat and drink which did not help and she ended up getting nauseous and vomiting stomach contents. No bile or blood.  She had not had any significant pelvic cramping or any vaginal discharge or blood. She endorses feeling warm and flushed but denies shaking chills or drenching sweats. Denies cough or SOB. Denies pain/burning with urination. She had two small loose stools today but prior to this had not had diarrhea. No sick contacts.     She then decided to go to ER near her home. While at the ED she reported a sudden onset of severe mid to upper epigastric pain that felt like a \"popping\" sensation and lasted for 30-45 minutes. She was evaluated by ED doc and OB-GYN who performed vaginal and bimanual exam without significant findings. Transvaginal US was performed and was able to identify a probable gestational sac that was 5 weeks by size and slightly irregular in shape. No fetal pole or cardiac function was identified.      ED labs significant for mildly elevated WBC (15) but " no left shift. Plts mildly elevated at 561. UA was collected and was negative for blood, nitrites, WBCs or LE. EKG showed sinus tach. CMP was normal. INR was 1.14. bHCG was 10,169.       Review of Systems   The 10 point Review of Systems is negative other than noted in the HPI.    Past Medical History    Hereditary spherocytosis  Pyenonephritis  Asplenia      Past Surgical History   Splenectomy 2013, Cholecystectomy 2011    Social History   Lives with fiance, monogomous. Former smoker, quit April 2017. Denies current alcohol, drug or tobacco use. No other OTC meds or supplements.     Family History   Mother and MGM w/ hereditary spherocytosis and h/o blood clots  Brother also with hereditary spherocytosis    Prior to Admission Medications   Prenatal vitamin, tylenol PRN     Allergies   Allergies   Allergen Reactions     Amoxicillin      Penicillins        Physical Exam   Vital Signs: Temp: 100.3  F (37.9  C) Temp src: Oral BP: 134/57 Pulse: 129   Resp: 18 SpO2: 98 % O2 Device: None (Room air)    Weight: 0 lbs 0 oz    Gen: alert, conversational, in mild distress 2/2 back pain, NAD  HEENT: NC/AT, EOMI w/ PERRL, anicteric sclera. OP clear. MMM. Neck supple no LAD  CV: normal S1,S2 with regular tachycardia no m/r/g  Resp: lungs CTA bilaterally with adequate air movement to bases. No wheezes or crackles  Abd: soft NTND no organomegaly or masses. BS normoactive.   MSK: mod/severe pain to palpation diffusely over lower back, worse in the center and improving to the hips. No CVA tenderness.  Ext: WWP no edema or cyanosis  Skin: no concerning lesions or rashes  Neuro: A&Ox4, no lateralizing sx. Grossly nonfocal.    !HEMATOLOGY Latest Ref Rng & Units 2/14/2018   WBC 4.0 - 11.0 10e9/L 12.7 (H)   RBC 3.8 - 5.2 10e12/L 3.94   HGB 11.7 - 15.7 g/dL 12.2   HCT 35.0 - 47.0 % 34.2 (L)   MCV 78 - 100 fl 87   MCH 26.5 - 33.0 pg 31.0   MCHC 31.5 - 36.5 g/dL 35.7   RDW 10.0 - 15.0 % 12.6    - 450 10e9/L 454 (H)   % NEUTROPHILS %  87.2   % LYMPHOCYTES % 6.3   ABSOLUTE LYMPHOCYTES 0.8 - 5.3 10e9/L 0.8   ABSOLUTE MONOCYTES 0.0 - 1.3 10e9/L 0.7   % EOSINOPHILS % 0.0   ABSOLUTE EOSINOPHILS 0.0 - 0.7 10e9/L 0.0   ABSOULTE BASOPHILS 0.0 - 0.2 10e9/L 0.0   ABSOLUTE NEUTROPHIL 1.6 - 8.3 10e9/L 11.1 (H)   !COMPREHENSIVE Latest Ref Rng & Units 2018   SODIUM 133 - 144 mmol/L 136   POTASSIUM 3.4 - 5.3 mmol/L 3.2 (L)   CHLORIDE 94 - 109 mmol/L 103   BUN 7 - 30 mg/dL 7   Creatinine 0.52 - 1.04 mg/dL 0.51 (L)   Glucose 70 - 99 mg/dL 99   ANION GAP 3 - 14 mmol/L 10   CALCIUM 8.5 - 10.1 mg/dL 8.0 (L)   ALBUMIN 3.4 - 5.0 g/dL 3.2 (L)   PROTEIN, TOTAL 6.8 - 8.8 g/dL 6.8   AST 0 - 45 U/L 486 (H)   ALT 0 - 50 U/L 285 (H)   ALKPHOS 40 - 150 U/L 76   BILIRUBIN TOTAL 0.2 - 1.3 mg/dL 1.5 (H)   Lipase 56  INR 1.1      Assessment & Plan   Tonya Thayer is a 22 year old  (9 weeks pregnant, LMP Dec 20th 2017) female who has a history of hereditary spherocytosis s/p splenectomy () who is admitted from OSH ED for back and upper abdominal pain as well as nausea and vomiting and found to be meeting SIRS criteria with a transaminitis.    # SIRS - meets 2/4 SIRs criteria with tachycardia and leukocytosis. Tmax 100.3 measured here. Lactate normal. Concern for intraabdominal process given severity and rapid onset of abdominal pain but unsure if infections vs noninfectious. Pain localized primarily upper epigastric. No uterine or pelvic pain to suggest chorio or PID. Previous vaginal and bimanual exam at OSH were unrevealing. Abdomen tender but not surgical. Unfortunately, unable to obtain rapid abdominal imaging (MRI or US) tonight.     # Abdominal pain // referred back pain // transaminitis - unclear etiology. Primarily hepatocellular pattern. Pt endorses taking only minimal tylenol and level normal. HS pts prone to bile sludging and stones but pt is s/p shabnam and no elevation of alkphos. Bili elevated but mostly indirect suggesting degree of hemolysis. Hb  stable but LDH elevated. DDx broad including gastroenteritis, viral hepatitis, peptic ulcers, abdominal thrombosis, early appendicitis, missed  etc but no clear unifying diagnosis. H/o UTI/Pyelo but UA at OSH normal and asymptomatic. Pt otherwise hemodynamically stable except for mild sinus tachy related to her pain.   - RUQ U/S ordered w/ dopplers  - HBV/HCV/HIV ordered  - BCx2 drawn  - UDS  - will hold off on empiric antibiotics at this time as infectious picture note entirely clear but low threshold to start  - AM CMP to trend  - PO dilaudid 2-4mg q3h PRN with 0.5-1mg IV for breakthrough    # IUP - 9 week estimated by LMP, 5 week by U/S which was abnormal. Possible missed  at this point but too early to tell. Pt denies vaginal bleeding or discharge. bHCG uptrending 1500 --> 10k  - OB/GYN consult in AM  - Urine for C/GC by PCR    # Hereditary spherocytosis - s/p cholecystectomy and splenectomy. Normal Hb on admission with appropriately normal reticulocytes suggesting little active hemolysis at this time. However, LDH elevated, which is nonspecific in setting of liver dysfxn.   - trend Hb      Diet:  clear liquids  Fluids: NS @ 100cc/hr for 1L  DVT Prophylaxis: Low Risk/Ambulatory with no VTE prophylaxis indicated  Code Status: Full Code    Jules Jernigan MD   Hematology / Oncology Fellow  P: [phone]

## 2018-02-15 NOTE — PLAN OF CARE
Problem: Patient Care Overview  Goal: Plan of Care/Patient Progress Review  Outcome: Improving  Focus: Physio  D: Temp max 99.1. Tachycardic. Pain in abdominal area and rounds to her back. Up independently. Voiding well. Dilaudid 0.5mg given every 1-3 hours. P: Continue current plan of care. Still needs pelvic ultrasound tonight.

## 2018-02-15 NOTE — PROGRESS NOTES
Met with patient s/p u/s: findings reveal irregularly shaped gestational sac, no yolk sac, no fetal pole, minimal free fluid in pelvis, and normal adnexa bilaterally without evidence for ectopic.     Reviewed these results with patient and recommend u/s in one week to determine viability of pregnancy. U/s findings are worrisome for miscarriage but u/s not definitive at this time.     If maternal condition worsens and imaging of liver with CT or MRI is indicated recommend to proceed however if it can be avoided would do so until viability of pregnancy can be proven.     If patient is discharged she will see her primary, Dr. Michelle Medina, for pregnancy followup and evaluation. If she remains inpatient we will continue to follow the patient.     Thank you!!    Deann Doan MD  [phone]  Office [phone]  Cell [phone]

## 2018-02-15 NOTE — CONSULTS
OB-GYN consult Note    21 yo  at unsure gestational age who presented to outside hospital with complaints of severe back pain. She has had nausea and emesis but no uterine/menstrual cramping or vaginal bleeding.   She was not attempting to conceive but states she desires to continue the pregnancy if it progresses normally.     Her medical history is quite complicated and includes: hereditary spherocytosis and is s/p splenectomy and cholecystectomy and current elevation in LFTs on uncertain etiology.     Pregnancy: Bhcg levels cherelle appropriately over the past 4 days (2/10 1486 and  10,169) and u/s revealed an irregular gestational sac without yolk sac or fetal pole.     PMH and PSH also positive for anxiety, genital HSV.    Family history noncontributory  Social History recently lost her job and dog was put to sleep. Here in hospital with sig other.       Temp: 99.5  F (37.5  C) Temp src: Oral BP: 108/53 Pulse: 130   Resp: 18 SpO2: 97 % O2 Device: None (Room air)    States pain is in central low back, states she did have some abdominal pain but that's resolved.  Appears well, NAD, resting.   Abdomen: soft, NT, ND  LE without significant edema or erythema.     A: 21 yo with significant medical history now pregnant with elevated LFTs    AT her current bhcg level we would expect to see a more developed pregnancy however we cannot completely rule out a viable pregnancy at this time.   Options: repeat Bhcg to determine if it is continuing to rise and/or repeat u/s in several days to assess for pregnancy development.     One can see a transient elevation of LFTs in early pregnancy in the setting of hyperemesis.     P: Will follow with primary team     Deann Doan  [phone]    Thank you!

## 2018-02-15 NOTE — PLAN OF CARE
Problem: Patient Care Overview  Goal: Plan of Care/Patient Progress Review  Outcome: No Change  Admission to 10A    Pt arrived @ 2115 from [facility] ED via transport, able to transfer from stretcher to bed with SBA, feeling dizzy but steady gait, oriented to call light and room, obtained VS and performed routine assessment, entered appropriate admission data into computer, Low grade temp max 100.3, tachycardia present, denies SOB or CP, moderate to severe pain reported in lower back area and abdomen- IV dilaudid given as soon as MD entered order, denies numbness or tingling, no nausea reported, MD came up to assess pt, new orders acknowledged, pt's fiance is here and will spend the night, pt able to make needs known, call light in reach length, will continue to monitor and assist.

## 2018-02-16 LAB
ALBUMIN SERPL-MCNC: 2.8 G/DL (ref 3.4–5)
ALP SERPL-CCNC: 66 U/L (ref 40–150)
ALT SERPL W P-5'-P-CCNC: 181 U/L (ref 0–50)
ANION GAP SERPL CALCULATED.3IONS-SCNC: 5 MMOL/L (ref 3–14)
AST SERPL W P-5'-P-CCNC: 84 U/L (ref 0–45)
B-HCG SERPL-ACNC: ABNORMAL IU/L (ref 0–5)
BASOPHILS # BLD AUTO: 0 10E9/L (ref 0–0.2)
BASOPHILS NFR BLD AUTO: 0.6 %
BILIRUB SERPL-MCNC: 0.5 MG/DL (ref 0.2–1.3)
BUN SERPL-MCNC: 6 MG/DL (ref 7–30)
C TRACH DNA SPEC QL NAA+PROBE: NEGATIVE
CALCIUM SERPL-MCNC: 7.7 MG/DL (ref 8.5–10.1)
CHLORIDE SERPL-SCNC: 106 MMOL/L (ref 94–109)
CO2 SERPL-SCNC: 27 MMOL/L (ref 20–32)
CREAT SERPL-MCNC: 0.52 MG/DL (ref 0.52–1.04)
DIFFERENTIAL METHOD BLD: ABNORMAL
EOSINOPHIL # BLD AUTO: 0 10E9/L (ref 0–0.7)
EOSINOPHIL NFR BLD AUTO: 0.6 %
ERYTHROCYTE [DISTWIDTH] IN BLOOD BY AUTOMATED COUNT: 12.9 % (ref 10–15)
GFR SERPL CREATININE-BSD FRML MDRD: >90 ML/MIN/1.7M2
GLUCOSE SERPL-MCNC: 77 MG/DL (ref 70–99)
HCT VFR BLD AUTO: 33 % (ref 35–47)
HETEROPH AB SER QL: NEGATIVE
HGB BLD-MCNC: 11.6 G/DL (ref 11.7–15.7)
IMM GRANULOCYTES # BLD: 0 10E9/L (ref 0–0.4)
IMM GRANULOCYTES NFR BLD: 0.6 %
LYMPHOCYTES # BLD AUTO: 2.2 10E9/L (ref 0.8–5.3)
LYMPHOCYTES NFR BLD AUTO: 39.6 %
MAGNESIUM SERPL-MCNC: 2 MG/DL (ref 1.6–2.3)
MCH RBC QN AUTO: 31 PG (ref 26.5–33)
MCHC RBC AUTO-ENTMCNC: 35.2 G/DL (ref 31.5–36.5)
MCV RBC AUTO: 88 FL (ref 78–100)
MONOCYTES # BLD AUTO: 0.9 10E9/L (ref 0–1.3)
MONOCYTES NFR BLD AUTO: 16.5 %
N GONORRHOEA DNA SPEC QL NAA+PROBE: NEGATIVE
NEUTROPHILS # BLD AUTO: 2.3 10E9/L (ref 1.6–8.3)
NEUTROPHILS NFR BLD AUTO: 42.1 %
NRBC # BLD AUTO: 0 10*3/UL
NRBC BLD AUTO-RTO: 0 /100
PLATELET # BLD AUTO: 425 10E9/L (ref 150–450)
POTASSIUM SERPL-SCNC: 4.1 MMOL/L (ref 3.4–5.3)
PROT SERPL-MCNC: 6.4 G/DL (ref 6.8–8.8)
RBC # BLD AUTO: 3.74 10E12/L (ref 3.8–5.2)
SODIUM SERPL-SCNC: 138 MMOL/L (ref 133–144)
SPECIMEN SOURCE: NORMAL
SPECIMEN SOURCE: NORMAL
WBC # BLD AUTO: 5.5 10E9/L (ref 4–11)

## 2018-02-16 PROCEDURE — 86308 HETEROPHILE ANTIBODY SCREEN: CPT | Performed by: STUDENT IN AN ORGANIZED HEALTH CARE EDUCATION/TRAINING PROGRAM

## 2018-02-16 PROCEDURE — 12000001 ZZH R&B MED SURG/OB UMMC

## 2018-02-16 PROCEDURE — 36415 COLL VENOUS BLD VENIPUNCTURE: CPT | Performed by: STUDENT IN AN ORGANIZED HEALTH CARE EDUCATION/TRAINING PROGRAM

## 2018-02-16 PROCEDURE — 25000125 ZZHC RX 250: Performed by: STUDENT IN AN ORGANIZED HEALTH CARE EDUCATION/TRAINING PROGRAM

## 2018-02-16 PROCEDURE — 85025 COMPLETE CBC W/AUTO DIFF WBC: CPT | Performed by: STUDENT IN AN ORGANIZED HEALTH CARE EDUCATION/TRAINING PROGRAM

## 2018-02-16 PROCEDURE — 25000128 H RX IP 250 OP 636: Performed by: INTERNAL MEDICINE

## 2018-02-16 PROCEDURE — 25000132 ZZH RX MED GY IP 250 OP 250 PS 637: Performed by: STUDENT IN AN ORGANIZED HEALTH CARE EDUCATION/TRAINING PROGRAM

## 2018-02-16 PROCEDURE — 25000128 H RX IP 250 OP 636: Performed by: STUDENT IN AN ORGANIZED HEALTH CARE EDUCATION/TRAINING PROGRAM

## 2018-02-16 PROCEDURE — 83735 ASSAY OF MAGNESIUM: CPT | Performed by: INTERNAL MEDICINE

## 2018-02-16 PROCEDURE — 84702 CHORIONIC GONADOTROPIN TEST: CPT | Performed by: STUDENT IN AN ORGANIZED HEALTH CARE EDUCATION/TRAINING PROGRAM

## 2018-02-16 PROCEDURE — 80053 COMPREHEN METABOLIC PANEL: CPT | Performed by: STUDENT IN AN ORGANIZED HEALTH CARE EDUCATION/TRAINING PROGRAM

## 2018-02-16 PROCEDURE — 99233 SBSQ HOSP IP/OBS HIGH 50: CPT | Performed by: INTERNAL MEDICINE

## 2018-02-16 PROCEDURE — 25000132 ZZH RX MED GY IP 250 OP 250 PS 637: Performed by: INTERNAL MEDICINE

## 2018-02-16 RX ORDER — ACETAMINOPHEN 325 MG/1
650 TABLET ORAL EVERY 4 HOURS PRN
Status: DISCONTINUED | OUTPATIENT
Start: 2018-02-16 | End: 2018-02-17 | Stop reason: HOSPADM

## 2018-02-16 RX ORDER — ONDANSETRON 2 MG/ML
4 INJECTION INTRAMUSCULAR; INTRAVENOUS EVERY 4 HOURS PRN
Status: DISCONTINUED | OUTPATIENT
Start: 2018-02-16 | End: 2018-02-17 | Stop reason: HOSPADM

## 2018-02-16 RX ORDER — HYDROMORPHONE HYDROCHLORIDE 1 MG/ML
.3-.5 INJECTION, SOLUTION INTRAMUSCULAR; INTRAVENOUS; SUBCUTANEOUS EVERY 4 HOURS PRN
Status: DISCONTINUED | OUTPATIENT
Start: 2018-02-16 | End: 2018-02-17 | Stop reason: HOSPADM

## 2018-02-16 RX ADMIN — ONDANSETRON 4 MG: 2 INJECTION INTRAMUSCULAR; INTRAVENOUS at 10:19

## 2018-02-16 RX ADMIN — PROCHLORPERAZINE EDISYLATE 5 MG: 5 INJECTION INTRAMUSCULAR; INTRAVENOUS at 21:23

## 2018-02-16 RX ADMIN — RANITIDINE HYDROCHLORIDE 50 MG: 25 INJECTION INTRAMUSCULAR; INTRAVENOUS at 21:23

## 2018-02-16 RX ADMIN — ONDANSETRON 4 MG: 2 INJECTION INTRAMUSCULAR; INTRAVENOUS at 19:41

## 2018-02-16 RX ADMIN — SODIUM CHLORIDE: 9 INJECTION, SOLUTION INTRAVENOUS at 02:54

## 2018-02-16 RX ADMIN — Medication 0.5 MG: at 00:10

## 2018-02-16 RX ADMIN — ONDANSETRON 4 MG: 2 INJECTION INTRAMUSCULAR; INTRAVENOUS at 00:10

## 2018-02-16 RX ADMIN — Medication 0.5 MG: at 01:04

## 2018-02-16 RX ADMIN — SODIUM CHLORIDE: 9 INJECTION, SOLUTION INTRAVENOUS at 10:42

## 2018-02-16 RX ADMIN — Medication 0.5 MG: at 06:28

## 2018-02-16 RX ADMIN — ONDANSETRON 4 MG: 2 INJECTION INTRAMUSCULAR; INTRAVENOUS at 15:07

## 2018-02-16 RX ADMIN — Medication 2 G: at 09:08

## 2018-02-16 RX ADMIN — Medication 0.5 MG: at 09:33

## 2018-02-16 RX ADMIN — FOLIC ACID 1 MG: 1 TABLET ORAL at 09:07

## 2018-02-16 RX ADMIN — ACETAMINOPHEN 650 MG: 325 TABLET, FILM COATED ORAL at 09:06

## 2018-02-16 RX ADMIN — ONDANSETRON 4 MG: 2 INJECTION INTRAMUSCULAR; INTRAVENOUS at 06:29

## 2018-02-16 RX ADMIN — ACETAMINOPHEN 650 MG: 325 TABLET, FILM COATED ORAL at 19:42

## 2018-02-16 ASSESSMENT — ACTIVITIES OF DAILY LIVING (ADL)
ADLS_ACUITY_SCORE: 17
ADLS_ACUITY_SCORE: 12
ADLS_ACUITY_SCORE: 17

## 2018-02-16 NOTE — PROGRESS NOTES
Pt seen, case reviewed with team    Pt continues to feel poorly  She developed a generalized HA during the night, not accompanied by visual changes  She has been nauseated, has had little to eat  Abd pain has largely resolved  She continues to have LBP without radiation to LE  She has only walked very short distances in room  Still continues to receive IV Dilaudid for back pain, feels oral dilaudid makes her nauseated    Afebrile  BP 110s-120s/  HR 80s  02 sats upper 90s RA    Sleepy, in NAD  Neck supple  No adenopathy  Lungs clear  CV rrr no M  Abd soft, non-distended, non-tender  Spine mild to mod tenderness with palp over lower L spine to sacral area. + tenderness with palpation either side of back particularly sacral area  No LE weakness  No LE edema    Results for SHANNEN MYNOR (MRN [id number]) as of 2/16/2018 12:03   Ref. Range 2/16/2018 06:19   Sodium Latest Ref Range: 133 - 144 mmol/L 138   Potassium Latest Ref Range: 3.4 - 5.3 mmol/L 4.1   Chloride Latest Ref Range: 94 - 109 mmol/L 106   Carbon Dioxide Latest Ref Range: 20 - 32 mmol/L 27   Urea Nitrogen Latest Ref Range: 7 - 30 mg/dL 6 (L)   Creatinine Latest Ref Range: 0.52 - 1.04 mg/dL 0.52   GFR Estimate Latest Ref Range: >60 mL/min/1.7m2 >90   GFR Estimate If Black Latest Ref Range: >60 mL/min/1.7m2 >90   Calcium Latest Ref Range: 8.5 - 10.1 mg/dL 7.7 (L)   Anion Gap Latest Ref Range: 3 - 14 mmol/L 5   Magnesium Latest Ref Range: 1.6 - 2.3 mg/dL 2.0   Albumin Latest Ref Range: 3.4 - 5.0 g/dL 2.8 (L)   Protein Total Latest Ref Range: 6.8 - 8.8 g/dL 6.4 (L)   Bilirubin Total Latest Ref Range: 0.2 - 1.3 mg/dL 0.5   Alkaline Phosphatase Latest Ref Range: 40 - 150 U/L 66   ALT Latest Ref Range: 0 - 50 U/L 181 (H)   AST Latest Ref Range: 0 - 45 U/L 84 (H)   HCG Quantitative Serum Latest Ref Range: 0 - 5 IU/L 38810 (H)   Glucose Latest Ref Range: 70 - 99 mg/dL 77   WBC Latest Ref Range: 4.0 - 11.0 10e9/L 5.5   Hemoglobin Latest Ref Range: 11.7 - 15.7 g/dL  11.6 (L)   Hematocrit Latest Ref Range: 35.0 - 47.0 % 33.0 (L)   Platelet Count Latest Ref Range: 150 - 450 10e9/L 425   RBC Count Latest Ref Range: 3.8 - 5.2 10e12/L 3.74 (L)   MCV Latest Ref Range: 78 - 100 fl 88   MCH Latest Ref Range: 26.5 - 33.0 pg 31.0   MCHC Latest Ref Range: 31.5 - 36.5 g/dL 35.2   RDW Latest Ref Range: 10.0 - 15.0 % 12.9   Diff Method Unknown Automated Method   % Neutrophils Latest Units: % 42.1   % Lymphocytes Latest Units: % 39.6   % Monocytes Latest Units: % 16.5   % Eosinophils Latest Units: % 0.6   % Basophils Latest Units: % 0.6   % Immature Granulocytes Latest Units: % 0.6   Nucleated RBCs Latest Ref Range: 0 /100 0   Absolute Neutrophil Latest Ref Range: 1.6 - 8.3 10e9/L 2.3   Absolute Lymphocytes Latest Ref Range: 0.8 - 5.3 10e9/L 2.2   Absolute Monocytes Latest Ref Range: 0.0 - 1.3 10e9/L 0.9   Absolute Eosinophils Latest Ref Range: 0.0 - 0.7 10e9/L 0.0   Absolute Basophils Latest Ref Range: 0.0 - 0.2 10e9/L 0.0   Abs Immature Granulocytes Latest Ref Range: 0 - 0.4 10e9/L 0.0   Absolute Nucleated RBC Unknown 0.0       BC neg  Urine for GC and chlamydia neg      Assessment    Abd pain, nausea, elevated transaminases, LBP in Pt who is 5 week pregnant.  Transaminases continue to trend down. Abd US suggested hepatic disease, no evidence of biliary or vascular obstruction (history of spherocytosis). Pelvic US revealed a small amount of fluid in the pelvis (per Ob, not unusual)  Pt has not had further imaging of spine or abd/pelvis, other than US, in view of pregnancy  BC neg. UA prior to admission was bland    Etiology of multiple symptoms, exam remains unclear. Differential continues to include: viral illness, pregnancy related abd inflammation, occult pelvic infection (clinically unlikely). Acute spine infection clinically unlikely--though imaging has not been performed secondary to pregnancy    HA may be related to a viral illness, vs med related    Plan  Continue supportive  tx  Trend LFTs  Minimize narcotics  Mobilize  If persistent GI or spine symptoms may need to pursue imaging  (CT abd/pelvis/spine).     Reviewed with Pt at length

## 2018-02-16 NOTE — PROGRESS NOTES
Discussed results of repeat b-HCG with patient. Abnormal rise in 48 hours from 10,000 to 11,766. Discussed these results are suggestive, but not diagnostic of miscarriage. Pt having no bleeding or cramping. Should have repeat US 11 days from first US on 2/14 for definitive diagnosis. Discussed options for management of miscarriage including expectant, medication management, or D&C. This can be further managed as an outpatient with her primary provider. Bleeding precautions given.     Stacy Bates MD  Ob/Gyn PGY-1  02/16/18 4:21 PM

## 2018-02-16 NOTE — PLAN OF CARE
Problem: Patient Care Overview  Goal: Plan of Care/Patient Progress Review  Outcome: No Change  Patient A & O x 4. Neuros and CMS intact. VSS and afebrile, SpO2 98% on room air (see flowsheet), MD aware. LS clear, BS + x 4, patient passing flatus. Patient urinating good amounts of clear yellow urine. Patient had a headache and has been very dizzy and nauseous this shift. Patient also had a small emesis x 1 this shift. PO Tylenol and IV Zofran given as ordered. Patient had pain in the lower back and was given IV Dilaudid x 1 this shift for pain. Patient is on continuous telemetry monitoring and has been in NSR this shift. Right PIV infusing. Patient on a regular diet and tolerating it poorly. BG was 77 at 0619. Patient up ad marques independently in room. , AST 84 this AM, MD aware. Mg 2.0 this AM and 2 gm of Mg replacement was given per protocol. Mg recheck ordered for tomorrow AM. Patient able to make needs known. Call light within reach. Will continue with POC.

## 2018-02-16 NOTE — PROVIDER NOTIFICATION
Writer went to discontinue the IV Mg replacement and the patient stated she felt hot. She also stated that at the beginning when the Mg was running she had some chest and upper abdominal pain and felt hot, but did not call the writer. Writer discontinued all of the IV bag and tubing and hung a new set and the patients symptoms subsided. Writer asked the patient to please call if she experienced anymore of these symptoms. Dr. Painting is aware of patients symptoms.

## 2018-02-16 NOTE — PLAN OF CARE
"Problem: Patient Care Overview  Goal: Plan of Care/Patient Progress Review  Outcome: No Change  Note for the night shift.  D: awake every 30-60+ minutes. Having trouble sleeping again tonight. At the start of the shift C/O nausea. Zofran given. Also C/O pain. .5 mg Dilaudid given. About 45\" later Says still has pain and more Dilaudid given. Says the abd pain is pretty much gone. Says it is the back pain that hurts so much. The pain in her back is below her waistline and just about her tail bone.  Has the heating pad off and on tonight, hoping it will help. Alert and oriented. Tolerating sips of water.  Voiding oK. Said when she was up at the end of evenings, she got really dizzy when up to the bathroom and her SO had to help her back to bed.  Stated when getting up tonight, she has been sitting at the edge of the bed for a brief while before walking to the bathroom and has done fine. Pt is sinus rhythm tonight.    SO slept at her bedside the first half of the night. Call light with in reach.Able to make needs known.   A: Doing OK. P: Monitor closely.  Supportive cares. Medicate for pain and nausea as needed.  0600:  C/O bad headache and her low low back pain. Requested Tylenol ,but she has none ordered. Dilaudid and Zofran given.  Couple ice packs to her head. She says her headache feels like a migraine. Still reluctant to try oral pain meds.   P: Try a little broth or crackers for breakfast after the Zofran is working.   "

## 2018-02-16 NOTE — PLAN OF CARE
Problem: Patient Care Overview  Goal: Plan of Care/Patient Progress Review  Outcome: No Change        VS:   VSS except tachy.  On tele Sinus tachy.  Afebrile.  LS clear.  Denies C/P & SOB   Output:   Voiding + w/out difficulty.  Pt denies any vaginal discharge.  BS+.  LBM 2/14/18 (2 loose stools per pt report)   Activity:   Indep.   Skin: Pt reported some itching on tele electrode sites.  Skin was washed and new electrodes applied.  Skin was slightly red in spots but dissipated after washing.  New electrodes applied   Pain:   Pain in lower back which pt reports also occasionally radiates to thighs.  Pain in abdomen mostly resolved.  Some tenderness with pressure in area above umbilicus.  Managed w/ IV dilaudid.  Pt was not able to tolerate PO dilaudid d/t nausea   Neuro/CMS:   Alert and oriented.  Cms +   Dressing(s):      Diet:   Reg diet.  Pt is not able to tolerate any PO intake beyond a few bites.  IV zofran admin x1 after emesis x1.     LDA:   R PIV infusing /hr   Equipment:      Plan:   Continue to monitor liver enzymes, pt condition and temp.  OB would like to hold off on CT/MRI if possible d/t pregnancy (still unsure of viability of pregnancy per last US)   Additional Info:   K+ replaced on manuel shift/ recheck in AM

## 2018-02-17 ENCOUNTER — COMMUNICATION - HEALTHEAST (OUTPATIENT)
Dept: FAMILY MEDICINE | Facility: CLINIC | Age: 23
End: 2018-02-17

## 2018-02-17 VITALS
HEIGHT: 63 IN | TEMPERATURE: 96.8 F | WEIGHT: 151 LBS | RESPIRATION RATE: 18 BRPM | SYSTOLIC BLOOD PRESSURE: 114 MMHG | HEART RATE: 92 BPM | BODY MASS INDEX: 26.75 KG/M2 | DIASTOLIC BLOOD PRESSURE: 56 MMHG | OXYGEN SATURATION: 99 %

## 2018-02-17 DIAGNOSIS — N91.2 AMENORRHEA: ICD-10-CM

## 2018-02-17 LAB
ALBUMIN SERPL-MCNC: 3.1 G/DL (ref 3.4–5)
ALP SERPL-CCNC: 66 U/L (ref 40–150)
ALT SERPL W P-5'-P-CCNC: 136 U/L (ref 0–50)
ANION GAP SERPL CALCULATED.3IONS-SCNC: 6 MMOL/L (ref 3–14)
AST SERPL W P-5'-P-CCNC: 40 U/L (ref 0–45)
BILIRUB SERPL-MCNC: 0.6 MG/DL (ref 0.2–1.3)
BUN SERPL-MCNC: 5 MG/DL (ref 7–30)
CALCIUM SERPL-MCNC: 8 MG/DL (ref 8.5–10.1)
CHLORIDE SERPL-SCNC: 108 MMOL/L (ref 94–109)
CO2 SERPL-SCNC: 24 MMOL/L (ref 20–32)
CREAT SERPL-MCNC: 0.4 MG/DL (ref 0.52–1.04)
GFR SERPL CREATININE-BSD FRML MDRD: >90 ML/MIN/1.7M2
GLUCOSE SERPL-MCNC: 76 MG/DL (ref 70–99)
MAGNESIUM SERPL-MCNC: 2.2 MG/DL (ref 1.6–2.3)
POTASSIUM SERPL-SCNC: 3.9 MMOL/L (ref 3.4–5.3)
PROT SERPL-MCNC: 7 G/DL (ref 6.8–8.8)
SODIUM SERPL-SCNC: 138 MMOL/L (ref 133–144)

## 2018-02-17 PROCEDURE — 83735 ASSAY OF MAGNESIUM: CPT | Performed by: INTERNAL MEDICINE

## 2018-02-17 PROCEDURE — 99238 HOSP IP/OBS DSCHRG MGMT 30/<: CPT | Performed by: INTERNAL MEDICINE

## 2018-02-17 PROCEDURE — 25000132 ZZH RX MED GY IP 250 OP 250 PS 637: Performed by: STUDENT IN AN ORGANIZED HEALTH CARE EDUCATION/TRAINING PROGRAM

## 2018-02-17 PROCEDURE — 80053 COMPREHEN METABOLIC PANEL: CPT | Performed by: INTERNAL MEDICINE

## 2018-02-17 PROCEDURE — 25000132 ZZH RX MED GY IP 250 OP 250 PS 637: Performed by: INTERNAL MEDICINE

## 2018-02-17 PROCEDURE — 25000128 H RX IP 250 OP 636: Performed by: INTERNAL MEDICINE

## 2018-02-17 PROCEDURE — 99207 ZZC CDG-CODE INCORRECT PER BILLING BASED ON TIME: CPT | Performed by: INTERNAL MEDICINE

## 2018-02-17 PROCEDURE — 36415 COLL VENOUS BLD VENIPUNCTURE: CPT | Performed by: INTERNAL MEDICINE

## 2018-02-17 RX ORDER — ONDANSETRON 4 MG/1
4 TABLET, ORALLY DISINTEGRATING ORAL EVERY 6 HOURS PRN
Qty: 12 TABLET | Refills: 1 | Status: SHIPPED | OUTPATIENT
Start: 2018-02-17 | End: 2021-08-06

## 2018-02-17 RX ADMIN — ONDANSETRON 4 MG: 2 INJECTION INTRAMUSCULAR; INTRAVENOUS at 09:17

## 2018-02-17 RX ADMIN — ACETAMINOPHEN 650 MG: 325 TABLET, FILM COATED ORAL at 06:09

## 2018-02-17 RX ADMIN — FOLIC ACID 1 MG: 1 TABLET ORAL at 09:18

## 2018-02-17 ASSESSMENT — ACTIVITIES OF DAILY LIVING (ADL)
ADLS_ACUITY_SCORE: 12

## 2018-02-17 NOTE — PLAN OF CARE
"Problem: Nausea/Vomiting (Adult)  Intervention: Minimize Nausea Triggers/Manage Symptoms    Administered a one-time dose of 5mg IV compazine and 50mg zantac. Pt reported partial relief of nausea around 22:00. States either the compazine or the zantac \"made me feel funny\" but unable to accurately describe sensation. Reports no bleeding or cramping. PIV in right AC infusing normal saline at 100cc/hr. Encouraged her to advance fluids slowly. Offered aromatherapy inhalers to support psychosocial well-being. Ambulates to bathroom independently but advised to call for help if and when feeling dizzy. Significant other in room, very attentive to cares.    Expressed relief of signs/symptoms of nausea. Offered saltine crackers and apple juice, appears to tolerate this well and expressed the desire to go home today.  TELE = Dysrhythmia. Pt had intermittent episodes of tachycardia but mostly normal sinus rhythm.   "

## 2018-02-17 NOTE — PLAN OF CARE
Problem: Patient Care Overview  Goal: Plan of Care/Patient Progress Review  Outcome: Adequate for Discharge Date Met: 02/17/18  Patient A & O x 4. Neuros and CMS intact. VSS and afebrile, SpO2 99% on room air (see flowsheet), MD aware. LS clear, BS + x 4, patient passing flatus. Patient urinating good amounts of clear yellow urine. Patient denies chest pain, SOB, and headache. No N/V noted this shift, but patient was given 1 dose of IV Zofran 4 mg pre-meal to prevent nausea while eating. Patient is on continuous telemetry monitoring and has been in NSR this shift with episodes of tachycardia, Dr. Painting is aware. Right PIV saline locked. Patient on a regular diet and tolerating it well. BG was 76 at 0553. Patient up ad marques independently in room. Mg 2.2, , AST 40 this AM, MD aware. Patient able to make needs known. Probable Call light within reach. Will continue with POC.    Patient cleared to discharge to home by Medicine MD. Discharge instructions and meds reviewed with and given to patient. Patients prescription was sent to her normal pharmacy. Patient discharged to home in safe and stable condition at 1145 with all belongings accompanied by her significant other.

## 2018-02-17 NOTE — PLAN OF CARE
Problem: Patient Care Overview  Goal: Plan of Care/Patient Progress Review  Outcome: No Change  Pt up independently in room. Denies pain. Still c/o nausea even after zofran. IV fluids infusing. Also continues to have dizziness but steady on feet. Pt resting in bed. Boyfriend present at bedside. Pt able to make needs known.

## 2018-02-17 NOTE — PROGRESS NOTES
Patient with persistent vomiting.   Try a dose of iv Zantac 50 and Compazine 5 mg  Each.    Frank Cortez MD  House Physician  Pager: [phone]    2/16/2018

## 2018-02-17 NOTE — PROGRESS NOTES
Pt seen, case reviewed with team  Pt feels dramatically improved this am, denies nausea, is tolerating some po  HA, dizziness and back pain resolved    Episodes of anxiety, tachycardia (sinus tach per tele) last night, per Pt typical of panic attacks, for which she normally takes Atenolol and lorazepam at home    No abd pain, vaginal bleeding, cramping    VSS  Alert, in good spirits, ambulating on unit  Lungs clear  CV rrr  Abd soft    Results for JUAN PABLO PRIDEGAIL (MRN [id number]) as of 2/17/2018 09:09   Ref. Range 2/17/2018 05:53   Sodium Latest Ref Range: 133 - 144 mmol/L 138   Potassium Latest Ref Range: 3.4 - 5.3 mmol/L 3.9   Chloride Latest Ref Range: 94 - 109 mmol/L 108   Carbon Dioxide Latest Ref Range: 20 - 32 mmol/L 24   Urea Nitrogen Latest Ref Range: 7 - 30 mg/dL 5 (L)   Creatinine Latest Ref Range: 0.52 - 1.04 mg/dL 0.40 (L)   GFR Estimate Latest Ref Range: >60 mL/min/1.7m2 >90   GFR Estimate If Black Latest Ref Range: >60 mL/min/1.7m2 >90   Calcium Latest Ref Range: 8.5 - 10.1 mg/dL 8.0 (L)   Anion Gap Latest Ref Range: 3 - 14 mmol/L 6   Magnesium Latest Ref Range: 1.6 - 2.3 mg/dL 2.2   Albumin Latest Ref Range: 3.4 - 5.0 g/dL 3.1 (L)   Protein Total Latest Ref Range: 6.8 - 8.8 g/dL 7.0   Bilirubin Total Latest Ref Range: 0.2 - 1.3 mg/dL 0.6   Alkaline Phosphatase Latest Ref Range: 40 - 150 U/L 66   ALT Latest Ref Range: 0 - 50 U/L 136 (H)   AST Latest Ref Range: 0 - 45 U/L 40   Glucose Latest Ref Range: 70 - 99 mg/dL 76       Assessment    Abd pain, nausea, back pain, elevated transaminases, all resolving without specific tx, ? Viral illness, ? Related to pregnancy    HA, dizziness, ? Viral illness, ? Meds, improved    Panic attacks (chronic) with accompanying sinus tach, improved this am    Pregnancy, likely non-viable    Plan  D/c to home if Pt continues to do well, intake adequate  Course reviewed with Pt and yon

## 2018-02-17 NOTE — PROVIDER NOTIFICATION
Pt had a short episode of tachycardia. HR up to 150. When assessed, pt reports having symptoms of a panic attack. Initiated continuous pulse oximetry monitoring. HR = 109-116 5 minutes later.   Requested ativan because she takes it at home for panic attacks.  Recommendation: Dr. Cortez doesn't recommend it for now.

## 2018-02-18 LAB
AMPHETAMINES UR QL SCN>1000 NG/ML: NEGATIVE
BARBITURATES UR QL SCN: NEGATIVE
BENZODIAZ UR QL SCN: NEGATIVE
BZE UR QL SCN>300 NG/ML: NEGATIVE
CARBOXYTHC UR QL SCN: NEGATIVE
CODEINE UR CFM-MCNC: NEGATIVE NG/ML
CREAT UR-MCNC: 169 MG/DL
ETHANOL UR QL: NEGATIVE
HYDROCODONE CTO UR CFM-MCNC: NEGATIVE NG/ML
HYDROMORPHONE CTO UR CFM-MCNC: NORMAL NG/ML
METHADONE UR QL SCN: NEGATIVE
MORPHINE UR CFM-MCNC: NEGATIVE NG/ML
OPIATES UR QL SCN: NORMAL
OXYCODONE UR QL SCN: NEGATIVE
PCP CTO UR SCN-MCNC: NEGATIVE NG/ML

## 2018-02-18 NOTE — DISCHARGE SUMMARY
Admit Date:     02/14/2018   Discharge Date:     02/17/2018      ADMISSION DATE:  2/14/2018.      DISCHARGE DATE:  2/17/2018.      Tonya Thayer is a 22-year-old female with a history of hereditary spherocytosis, status post  splenectomy and cholecystectomy approximately 5 years ago with current pregnancy estimated to be 7-8 weeks, who was admitted in transfer from an outside hospital for the evaluation of abdominal pain, nausea, vomiting and low back pain and elevated transaminases.        The etiology of the patient's multiple symptoms is not clear on admission.  She was seen by OB/GYN, who noted concerns regarding the status of her pregnancy.  She ultimately did have serial beta hCG levels which were not rising appropriately.  This fact in conjunction with pelvic ultrasound did raise concern regarding the viability of the fetus and it is now felt that patient likely will have a miscarriage.      The patient's radiologic evaluation was limited by her pregnancy.  She did have an ultrasound of her right upper quadrant which revealed hepatic changes consistent with hepatic inflammation.  There is no sign of biliary or vascular obstruction.  Theoretically, a patient with spherocytosis who have had a splenectomy are at risk for vascular obstruction, but this seems clinically unlikely.  The patient's transaminases were trended and had almost normalized at the time of her discharge without specific treatment.  The patient continued to have significant low back pain, the etiology of which was not clear, but also gradually improved with supportive treatments.  The patient did require intravenous Dilaudid for several days and subsequently did develop nausea, headache, dizziness which most likely was related to the medications.      It is difficult to find a unifying diagnosis as a cause for her GI symptoms, elevated transaminases and severe low back pain.  The Obstetric Service felt that it was unlikely that her  symptoms were related to her pregnancy.  Again all of her symptoms did gradually improve with supportive treatments.  She did not have a CT of her abdomen and pelvis nor of her spine as her symptoms were resolved without specific treatment.  The patient was counseled with the OB Service regarding the likelihood of an unviable fetus and probable miscarriage.  The patient will follow up with her primary OB physician in approximately 1 week in this regard.      As above, the etiology of her GI symptoms and low back pain are not clear.  Differential includes a viral illness (mono screen was negative) versus some type of pregnancy complication versus some type of intraabdominal complication of her pregnancy.  At the time of discharge, the patient was afebrile, felt considerably improved, had near complete resolution of her low back pain, had no abdominal pain, had residual mild nausea, but was tolerating small meals.  She felt comfortable with plans for discharge.      The night prior to discharge, the patient did note panic attacks and was found to have episodic tachycardia with heart rates in 150s with a sinus mechanism.  The patient states that she has had similar episodes of panic attacks and documented tachycardia for which she takes p.r.n. Atenolol and lorazepam.  She did not have further cardiac evaluation during this hospitalization.      DISCHARGE DIAGNOSES:   1.  Abdominal pain, elevated transaminases, low back pain and fever of unclear etiology, but with gradual resolution with supportive treatments.  On the day of discharge, her ALT was 136, AST of 40.  Transaminases on admission were in the mid 200s.   2.  Approximate 7-week pregnancy with pelvic ultrasound suggesting gestational age of 5 weeks, but felt to be nonviable in view of the slow rise of beta hCG.  There is no evidence of adnexal process on pelvic ultrasound.   3.  Hereditary spherocytosis without evidence of hemolysis during this hospitalization.    4.  Depression with anxiety and panic attacks with episodic sinus tachycardia noted on the evening prior to discharge.  As above, this is not new for this patient.      Mynor was discharged to home.  She does have a followup plan with her own OB doctor in approximately 1 week.  She was advised to return to the ER should she experience worsening abdominal pain, nausea, fever, back pain or severe abdominal cramps and/or vaginal bleeding.      DISCHARGE MEDICATIONS:  Are only Zofran 4 mg ODT 4 times a day p.r.n., dispensed #12 with 1 refill.  The patient will continue her p.r.n. atenolol and lorazepam as prescribed by her primary care.  A consideration will need to be given to the resumption of Effexor which had been discontinued approximately a month ago when she was first found to be pregnant.         CARMEN GARVIN MD             D: 2018   T: 2018   MT: HANNAH      Name:     MNYOR PRIDE   MRN:      [id number]        Account:        [id number]   :      1995           Admit Date:     2018                                  Discharge Date: 2018      Document: [id number]

## 2018-02-21 LAB
BACTERIA SPEC CULT: NO GROWTH
BACTERIA SPEC CULT: NO GROWTH
Lab: NORMAL
Lab: NORMAL
SPECIMEN SOURCE: NORMAL
SPECIMEN SOURCE: NORMAL

## 2018-02-23 ENCOUNTER — HOSPITAL ENCOUNTER (OUTPATIENT)
Dept: ULTRASOUND IMAGING | Facility: CLINIC | Age: 23
Discharge: HOME | End: 2018-02-23
Attending: FAMILY MEDICINE

## 2018-02-23 DIAGNOSIS — N91.2 AMENORRHEA: ICD-10-CM

## 2018-02-25 ENCOUNTER — AMBULATORY - HEALTHEAST (OUTPATIENT)
Dept: FAMILY MEDICINE | Facility: CLINIC | Age: 23
End: 2018-02-25

## 2018-02-25 DIAGNOSIS — O20.8 SUBCHORIONIC HEMORRHAGE IN FIRST TRIMESTER: ICD-10-CM

## 2018-02-27 ENCOUNTER — OFFICE VISIT - HEALTHEAST (OUTPATIENT)
Dept: FAMILY MEDICINE | Facility: CLINIC | Age: 23
End: 2018-02-27

## 2018-02-27 DIAGNOSIS — O21.0 MORNING SICKNESS: ICD-10-CM

## 2018-02-27 DIAGNOSIS — F41.1 GAD (GENERALIZED ANXIETY DISORDER): ICD-10-CM

## 2018-02-27 DIAGNOSIS — F41.9 ANXIETY: ICD-10-CM

## 2018-03-09 ENCOUNTER — HOSPITAL ENCOUNTER (OUTPATIENT)
Dept: ULTRASOUND IMAGING | Facility: CLINIC | Age: 23
Discharge: HOME | End: 2018-03-09
Attending: FAMILY MEDICINE

## 2018-03-09 ENCOUNTER — COMMUNICATION - HEALTHEAST (OUTPATIENT)
Dept: FAMILY MEDICINE | Facility: CLINIC | Age: 23
End: 2018-03-09

## 2018-03-09 DIAGNOSIS — O20.8 SUBCHORIONIC HEMORRHAGE IN FIRST TRIMESTER: ICD-10-CM

## 2018-03-14 ENCOUNTER — COMMUNICATION - HEALTHEAST (OUTPATIENT)
Dept: FAMILY MEDICINE | Facility: CLINIC | Age: 23
End: 2018-03-14

## 2018-03-21 ENCOUNTER — PRENATAL OFFICE VISIT - HEALTHEAST (OUTPATIENT)
Dept: FAMILY MEDICINE | Facility: CLINIC | Age: 23
End: 2018-03-21

## 2018-03-21 DIAGNOSIS — O21.0 MORNING SICKNESS: ICD-10-CM

## 2018-03-21 DIAGNOSIS — F41.1 GAD (GENERALIZED ANXIETY DISORDER): ICD-10-CM

## 2018-04-02 ENCOUNTER — RECORDS - HEALTHEAST (OUTPATIENT)
Dept: ADMINISTRATIVE | Facility: OTHER | Age: 23
End: 2018-04-02

## 2018-04-02 ENCOUNTER — COMMUNICATION - HEALTHEAST (OUTPATIENT)
Dept: FAMILY MEDICINE | Facility: CLINIC | Age: 23
End: 2018-04-02

## 2018-04-02 ENCOUNTER — PRENATAL OFFICE VISIT - HEALTHEAST (OUTPATIENT)
Dept: FAMILY MEDICINE | Facility: CLINIC | Age: 23
End: 2018-04-02

## 2018-04-02 DIAGNOSIS — Z34.01 SUPERVISION OF NORMAL FIRST PREGNANCY IN FIRST TRIMESTER: ICD-10-CM

## 2018-04-02 LAB
ALBUMIN UR-MCNC: ABNORMAL MG/DL
APPEARANCE UR: CLEAR
BASOPHILS # BLD AUTO: 0 THOU/UL (ref 0–0.2)
BASOPHILS NFR BLD AUTO: 0 % (ref 0–2)
BILIRUB UR QL STRIP: NEGATIVE
COLOR UR AUTO: YELLOW
EOSINOPHIL # BLD AUTO: 0.1 THOU/UL (ref 0–0.4)
EOSINOPHIL NFR BLD AUTO: 1 % (ref 0–6)
ERYTHROCYTE [DISTWIDTH] IN BLOOD BY AUTOMATED COUNT: 12.9 % (ref 11–14.5)
GLUCOSE UR STRIP-MCNC: NEGATIVE MG/DL
HBV SURFACE AG SERPL QL IA: NEGATIVE
HCT VFR BLD AUTO: 37.2 % (ref 35–47)
HGB BLD-MCNC: 13.3 G/DL (ref 12–16)
HGB UR QL STRIP: NEGATIVE
HIV 1+2 AB+HIV1 P24 AG SERPL QL IA: NEGATIVE
KETONES UR STRIP-MCNC: NEGATIVE MG/DL
LEUKOCYTE ESTERASE UR QL STRIP: ABNORMAL
LYMPHOCYTES # BLD AUTO: 2.2 THOU/UL (ref 0.8–4.4)
LYMPHOCYTES NFR BLD AUTO: 18 % (ref 20–40)
MCH RBC QN AUTO: 31.1 PG (ref 27–34)
MCHC RBC AUTO-ENTMCNC: 35.8 G/DL (ref 32–36)
MCV RBC AUTO: 87 FL (ref 80–100)
MONOCYTES # BLD AUTO: 0.7 THOU/UL (ref 0–0.9)
MONOCYTES NFR BLD AUTO: 6 % (ref 2–10)
NEUTROPHILS # BLD AUTO: 9.2 THOU/UL (ref 2–7.7)
NEUTROPHILS NFR BLD AUTO: 75 % (ref 50–70)
NITRATE UR QL: NEGATIVE
PH UR STRIP: 7.5 [PH] (ref 5–8)
PLATELET # BLD AUTO: 639 THOU/UL (ref 140–440)
PMV BLD AUTO: 8.8 FL (ref 8.5–12.5)
RBC # BLD AUTO: 4.27 MILL/UL (ref 3.8–5.4)
SP GR UR STRIP: 1.02 (ref 1–1.03)
UROBILINOGEN UR STRIP-ACNC: ABNORMAL
WBC: 12.3 THOU/UL (ref 4–11)

## 2018-04-03 LAB
ABO/RH(D): NORMAL
ABORH REPEAT: NORMAL
ANTIBODY SCREEN: NEGATIVE
BACTERIA SPEC CULT: NO GROWTH
C TRACH DNA SPEC QL PROBE+SIG AMP: NEGATIVE
N GONORRHOEA DNA SPEC QL NAA+PROBE: NEGATIVE
RUBV IGG SERPL QL IA: POSITIVE
T PALLIDUM AB SER QL: NEGATIVE

## 2018-04-04 ENCOUNTER — COMMUNICATION - HEALTHEAST (OUTPATIENT)
Dept: FAMILY MEDICINE | Facility: CLINIC | Age: 23
End: 2018-04-04

## 2018-04-10 LAB — HSV IGM ANTIBODY: 0.94 INDEX VALUE (ref 0–0.89)

## 2018-04-11 ENCOUNTER — COMMUNICATION - HEALTHEAST (OUTPATIENT)
Dept: FAMILY MEDICINE | Facility: CLINIC | Age: 23
End: 2018-04-11

## 2018-04-12 ENCOUNTER — COMMUNICATION - HEALTHEAST (OUTPATIENT)
Dept: FAMILY MEDICINE | Facility: CLINIC | Age: 23
End: 2018-04-12

## 2018-05-02 ENCOUNTER — PRENATAL OFFICE VISIT - HEALTHEAST (OUTPATIENT)
Dept: FAMILY MEDICINE | Facility: CLINIC | Age: 23
End: 2018-05-02

## 2018-05-02 DIAGNOSIS — F41.1 GAD (GENERALIZED ANXIETY DISORDER): ICD-10-CM

## 2018-05-02 DIAGNOSIS — O21.0 MORNING SICKNESS: ICD-10-CM

## 2018-05-02 DIAGNOSIS — F41.9 ANXIETY: ICD-10-CM

## 2018-05-02 DIAGNOSIS — Z34.02 SUPERVISION OF NORMAL FIRST PREGNANCY IN SECOND TRIMESTER: ICD-10-CM

## 2018-05-08 ENCOUNTER — COMMUNICATION - HEALTHEAST (OUTPATIENT)
Dept: SCHEDULING | Facility: CLINIC | Age: 23
End: 2018-05-08

## 2018-05-08 ENCOUNTER — COMMUNICATION - HEALTHEAST (OUTPATIENT)
Dept: FAMILY MEDICINE | Facility: CLINIC | Age: 23
End: 2018-05-08

## 2018-05-08 ENCOUNTER — OFFICE VISIT - HEALTHEAST (OUTPATIENT)
Dept: FAMILY MEDICINE | Facility: CLINIC | Age: 23
End: 2018-05-08

## 2018-05-08 DIAGNOSIS — Z34.92 SECOND TRIMESTER PREGNANCY: ICD-10-CM

## 2018-05-08 DIAGNOSIS — G43.109 OCULAR MIGRAINE: ICD-10-CM

## 2018-05-08 ASSESSMENT — MIFFLIN-ST. JEOR: SCORE: 1428.91

## 2018-05-30 ENCOUNTER — HOSPITAL ENCOUNTER (OUTPATIENT)
Dept: ULTRASOUND IMAGING | Facility: CLINIC | Age: 23
Discharge: HOME | End: 2018-05-30
Attending: FAMILY MEDICINE

## 2018-05-30 DIAGNOSIS — Z34.02 SUPERVISION OF NORMAL FIRST PREGNANCY IN SECOND TRIMESTER: ICD-10-CM

## 2018-06-01 ENCOUNTER — COMMUNICATION - HEALTHEAST (OUTPATIENT)
Dept: FAMILY MEDICINE | Facility: CLINIC | Age: 23
End: 2018-06-01

## 2018-06-06 ENCOUNTER — PRENATAL OFFICE VISIT - HEALTHEAST (OUTPATIENT)
Dept: FAMILY MEDICINE | Facility: CLINIC | Age: 23
End: 2018-06-06

## 2018-06-06 DIAGNOSIS — Z34.02 SUPERVISION OF NORMAL FIRST PREGNANCY IN SECOND TRIMESTER: ICD-10-CM

## 2018-06-09 ENCOUNTER — COMMUNICATION - HEALTHEAST (OUTPATIENT)
Dept: FAMILY MEDICINE | Facility: CLINIC | Age: 23
End: 2018-06-09

## 2018-06-09 DIAGNOSIS — O21.0 MORNING SICKNESS: ICD-10-CM

## 2018-07-10 ENCOUNTER — PRENATAL OFFICE VISIT - HEALTHEAST (OUTPATIENT)
Dept: FAMILY MEDICINE | Facility: CLINIC | Age: 23
End: 2018-07-10

## 2018-07-10 DIAGNOSIS — Z34.02 SUPERVISION OF NORMAL FIRST PREGNANCY IN SECOND TRIMESTER: ICD-10-CM

## 2018-07-14 ENCOUNTER — HOSPITAL ENCOUNTER (OUTPATIENT)
Dept: MEDSURG UNIT | Facility: CLINIC | Age: 23
Discharge: HOME | End: 2018-07-14
Attending: FAMILY MEDICINE | Admitting: FAMILY MEDICINE

## 2018-07-14 LAB
ABO/RH(D): NORMAL
ALBUMIN UR-MCNC: ABNORMAL MG/DL
ALT SERPL W P-5'-P-CCNC: <9 U/L (ref 0–45)
ANTIBODY SCREEN: NEGATIVE
APTT PPP: 26 SECONDS (ref 24–37)
AST SERPL W P-5'-P-CCNC: 12 U/L (ref 0–40)
CREAT SERPL-MCNC: 0.55 MG/DL (ref 0.6–1.1)
CREAT UR-MCNC: 191.2 MG/DL
ERYTHROCYTE [DISTWIDTH] IN BLOOD BY AUTOMATED COUNT: 14 % (ref 11–14.5)
GFR SERPL CREATININE-BSD FRML MDRD: >60 ML/MIN/1.73M2
GLUCOSE UR STRIP-MCNC: NEGATIVE MG/DL
HCT VFR BLD AUTO: 31.9 % (ref 35–47)
HGB BLD-MCNC: 11.4 G/DL (ref 12–16)
INR PPP: 1.02 (ref 0.9–1.1)
KETONES UR STRIP-MCNC: NEGATIVE MG/DL
MCH RBC QN AUTO: 31.2 PG (ref 27–34)
MCHC RBC AUTO-ENTMCNC: 35.7 G/DL (ref 32–36)
MCV RBC AUTO: 87 FL (ref 80–100)
PLATELET # BLD AUTO: 538 THOU/UL (ref 140–440)
PMV BLD AUTO: 9 FL (ref 8.5–12.5)
PROTEIN, RANDOM URINE - HISTORICAL: 13 MG/DL
PROTEIN/CREAT RATIO, RANDOM UR: 0.07
RBC # BLD AUTO: 3.65 MILL/UL (ref 3.8–5.4)
URATE SERPL-MCNC: 3.6 MG/DL (ref 2–7.5)
WBC: 16.8 THOU/UL (ref 4–11)

## 2018-07-14 ASSESSMENT — MIFFLIN-ST. JEOR: SCORE: 1477.1

## 2018-07-16 ENCOUNTER — OFFICE VISIT - HEALTHEAST (OUTPATIENT)
Dept: FAMILY MEDICINE | Facility: CLINIC | Age: 23
End: 2018-07-16

## 2018-07-16 DIAGNOSIS — G43.009 NONINTRACTABLE MIGRAINE: ICD-10-CM

## 2018-07-24 ENCOUNTER — PRENATAL OFFICE VISIT - HEALTHEAST (OUTPATIENT)
Dept: FAMILY MEDICINE | Facility: CLINIC | Age: 23
End: 2018-07-24

## 2018-07-24 ENCOUNTER — HOSPITAL ENCOUNTER (OUTPATIENT)
Dept: LAB | Age: 23
Setting detail: SPECIMEN
Discharge: HOME | End: 2018-07-24

## 2018-07-24 DIAGNOSIS — Z34.90 SUPERVISION OF NORMAL PREGNANCY: ICD-10-CM

## 2018-07-24 LAB
ALT SERPL W P-5'-P-CCNC: 14 U/L (ref 0–45)
APTT PPP: 28 SECONDS (ref 24–37)
AST SERPL W P-5'-P-CCNC: 12 U/L (ref 0–40)
BASOPHILS # BLD AUTO: 0.1 THOU/UL (ref 0–0.2)
BASOPHILS NFR BLD AUTO: 0 % (ref 0–2)
BUN SERPL-MCNC: 6 MG/DL (ref 8–22)
CREAT SERPL-MCNC: 0.5 MG/DL (ref 0.6–1.1)
EOSINOPHIL # BLD AUTO: 0 THOU/UL (ref 0–0.4)
EOSINOPHIL NFR BLD AUTO: 0 % (ref 0–6)
ERYTHROCYTE [DISTWIDTH] IN BLOOD BY AUTOMATED COUNT: 14.5 % (ref 11–14.5)
FASTING STATUS PATIENT QL REPORTED: NO
GFR SERPL CREATININE-BSD FRML MDRD: >60 ML/MIN/1.73M2
GLUCOSE 1H P 50 G GLC PO SERPL-MCNC: 69 MG/DL (ref 70–139)
HCT VFR BLD AUTO: 32.3 % (ref 35–47)
HGB BLD-MCNC: 11 G/DL (ref 12–16)
INR PPP: 0.97 (ref 0.9–1.1)
LYMPHOCYTES # BLD AUTO: 2.3 THOU/UL (ref 0.8–4.4)
LYMPHOCYTES NFR BLD AUTO: 15 % (ref 20–40)
MCH RBC QN AUTO: 30.6 PG (ref 27–34)
MCHC RBC AUTO-ENTMCNC: 34.1 G/DL (ref 32–36)
MCV RBC AUTO: 90 FL (ref 80–100)
MONOCYTES # BLD AUTO: 1 THOU/UL (ref 0–0.9)
MONOCYTES NFR BLD AUTO: 7 % (ref 2–10)
NEUTROPHILS # BLD AUTO: 11.2 THOU/UL (ref 2–7.7)
NEUTROPHILS NFR BLD AUTO: 77 % (ref 50–70)
PLATELET # BLD AUTO: 648 THOU/UL (ref 140–440)
PMV BLD AUTO: 10 FL (ref 8.5–12.5)
RBC # BLD AUTO: 3.6 MILL/UL (ref 3.8–5.4)
URATE SERPL-MCNC: 3.1 MG/DL (ref 2–7.5)
WBC: 14.8 THOU/UL (ref 4–11)

## 2018-07-25 LAB — T PALLIDUM AB SER QL: NEGATIVE

## 2018-07-26 ENCOUNTER — COMMUNICATION - HEALTHEAST (OUTPATIENT)
Dept: FAMILY MEDICINE | Facility: CLINIC | Age: 23
End: 2018-07-26

## 2018-08-07 ENCOUNTER — PRENATAL OFFICE VISIT - HEALTHEAST (OUTPATIENT)
Dept: FAMILY MEDICINE | Facility: CLINIC | Age: 23
End: 2018-08-07

## 2018-08-07 DIAGNOSIS — Z34.03 SUPERVISION OF NORMAL FIRST PREGNANCY IN THIRD TRIMESTER: ICD-10-CM

## 2018-08-07 DIAGNOSIS — F41.1 GAD (GENERALIZED ANXIETY DISORDER): ICD-10-CM

## 2018-08-07 DIAGNOSIS — F41.9 ANXIETY: ICD-10-CM

## 2018-08-07 DIAGNOSIS — M54.9 BACK PAIN: ICD-10-CM

## 2018-08-07 DIAGNOSIS — R12 HEARTBURN: ICD-10-CM

## 2018-08-07 LAB
ALBUMIN SERPL-MCNC: 2.8 G/DL (ref 3.5–5)
ALBUMIN UR-MCNC: NEGATIVE MG/DL
ALP SERPL-CCNC: 82 U/L (ref 45–120)
ALT SERPL W P-5'-P-CCNC: <9 U/L (ref 0–45)
ANION GAP SERPL CALCULATED.3IONS-SCNC: 10 MMOL/L (ref 5–18)
APPEARANCE UR: CLEAR
AST SERPL W P-5'-P-CCNC: 12 U/L (ref 0–40)
BILIRUB SERPL-MCNC: 0.5 MG/DL (ref 0–1)
BILIRUB UR QL STRIP: NEGATIVE
BUN SERPL-MCNC: 6 MG/DL (ref 8–22)
CALCIUM SERPL-MCNC: 9 MG/DL (ref 8.5–10.5)
CHLORIDE BLD-SCNC: 104 MMOL/L (ref 98–107)
CO2 SERPL-SCNC: 23 MMOL/L (ref 22–31)
COLOR UR AUTO: YELLOW
CREAT SERPL-MCNC: 0.48 MG/DL (ref 0.6–1.1)
ERYTHROCYTE [DISTWIDTH] IN BLOOD BY AUTOMATED COUNT: 12.3 % (ref 11–14.5)
GFR SERPL CREATININE-BSD FRML MDRD: >60 ML/MIN/1.73M2
GLUCOSE BLD-MCNC: 66 MG/DL (ref 70–125)
GLUCOSE UR STRIP-MCNC: NEGATIVE MG/DL
HCT VFR BLD AUTO: 32 % (ref 35–47)
HGB BLD-MCNC: 11.2 G/DL (ref 12–16)
HGB UR QL STRIP: NEGATIVE
KETONES UR STRIP-MCNC: NEGATIVE MG/DL
LEUKOCYTE ESTERASE UR QL STRIP: ABNORMAL
MCH RBC QN AUTO: 29.9 PG (ref 27–34)
MCHC RBC AUTO-ENTMCNC: 35.1 G/DL (ref 32–36)
MCV RBC AUTO: 85 FL (ref 80–100)
NITRATE UR QL: NEGATIVE
PH UR STRIP: 7 [PH] (ref 5–8)
PLATELET # BLD AUTO: 620 THOU/UL (ref 140–440)
PMV BLD AUTO: 7.4 FL (ref 7–10)
POTASSIUM BLD-SCNC: 4.1 MMOL/L (ref 3.5–5)
PROT SERPL-MCNC: 6.8 G/DL (ref 6–8)
RBC # BLD AUTO: 3.76 MILL/UL (ref 3.8–5.4)
SODIUM SERPL-SCNC: 137 MMOL/L (ref 136–145)
SP GR UR STRIP: 1.01 (ref 1–1.03)
UROBILINOGEN UR STRIP-ACNC: ABNORMAL
WBC: 20.5 THOU/UL (ref 4–11)

## 2018-08-08 LAB — BACTERIA SPEC CULT: NO GROWTH

## 2018-08-09 ENCOUNTER — COMMUNICATION - HEALTHEAST (OUTPATIENT)
Dept: FAMILY MEDICINE | Facility: CLINIC | Age: 23
End: 2018-08-09

## 2018-08-10 ENCOUNTER — PRENATAL OFFICE VISIT - HEALTHEAST (OUTPATIENT)
Dept: FAMILY MEDICINE | Facility: CLINIC | Age: 23
End: 2018-08-10

## 2018-08-10 ENCOUNTER — COMMUNICATION - HEALTHEAST (OUTPATIENT)
Dept: FAMILY MEDICINE | Facility: CLINIC | Age: 23
End: 2018-08-10

## 2018-08-10 DIAGNOSIS — G43.009 NONINTRACTABLE MIGRAINE: ICD-10-CM

## 2018-08-13 ENCOUNTER — COMMUNICATION - HEALTHEAST (OUTPATIENT)
Dept: FAMILY MEDICINE | Facility: CLINIC | Age: 23
End: 2018-08-13

## 2018-08-16 ENCOUNTER — HOSPITAL ENCOUNTER (OUTPATIENT)
Dept: MEDSURG UNIT | Facility: CLINIC | Age: 23
Discharge: HOME | End: 2018-08-16
Attending: FAMILY MEDICINE | Admitting: FAMILY MEDICINE

## 2018-08-16 ENCOUNTER — COMMUNICATION - HEALTHEAST (OUTPATIENT)
Dept: FAMILY MEDICINE | Facility: CLINIC | Age: 23
End: 2018-08-16

## 2018-08-16 DIAGNOSIS — R51.9 HEADACHE: ICD-10-CM

## 2018-08-16 DIAGNOSIS — Z34.90 PREGNANCY: ICD-10-CM

## 2018-08-16 LAB
ABO/RH(D): NORMAL
ALT SERPL W P-5'-P-CCNC: 10 U/L (ref 0–45)
ANTIBODY SCREEN: NEGATIVE
APTT PPP: 24 SECONDS (ref 24–37)
AST SERPL W P-5'-P-CCNC: 12 U/L (ref 0–40)
CREAT SERPL-MCNC: 0.5 MG/DL (ref 0.6–1.1)
CREAT UR-MCNC: 20.9 MG/DL
ERYTHROCYTE [DISTWIDTH] IN BLOOD BY AUTOMATED COUNT: 14.6 % (ref 11–14.5)
GFR SERPL CREATININE-BSD FRML MDRD: >60 ML/MIN/1.73M2
HCT VFR BLD AUTO: 28.4 % (ref 35–47)
HGB BLD-MCNC: 10 G/DL (ref 12–16)
INR PPP: 1 (ref 0.9–1.1)
MCH RBC QN AUTO: 29.9 PG (ref 27–34)
MCHC RBC AUTO-ENTMCNC: 35.2 G/DL (ref 32–36)
MCV RBC AUTO: 85 FL (ref 80–100)
PLATELET # BLD AUTO: 586 THOU/UL (ref 140–440)
PMV BLD AUTO: 9.9 FL (ref 8.5–12.5)
PROTEIN, RANDOM URINE - HISTORICAL: <7 MG/DL
PROTEIN/CREAT RATIO, RANDOM UR: NORMAL
RBC # BLD AUTO: 3.34 MILL/UL (ref 3.8–5.4)
URATE SERPL-MCNC: 3.1 MG/DL (ref 2–7.5)
WBC: 16.7 THOU/UL (ref 4–11)

## 2018-08-21 ENCOUNTER — PRENATAL OFFICE VISIT - HEALTHEAST (OUTPATIENT)
Dept: FAMILY MEDICINE | Facility: CLINIC | Age: 23
End: 2018-08-21

## 2018-08-21 DIAGNOSIS — Z34.03 SUPERVISION OF NORMAL FIRST PREGNANCY IN THIRD TRIMESTER: ICD-10-CM

## 2018-08-21 DIAGNOSIS — G43.909 MIGRAINE HEADACHE: ICD-10-CM

## 2018-08-22 ENCOUNTER — COMMUNICATION - HEALTHEAST (OUTPATIENT)
Dept: FAMILY MEDICINE | Facility: CLINIC | Age: 23
End: 2018-08-22

## 2018-08-22 ENCOUNTER — HOSPITAL ENCOUNTER (OUTPATIENT)
Dept: ULTRASOUND IMAGING | Facility: CLINIC | Age: 23
Discharge: HOME | End: 2018-08-22
Attending: FAMILY MEDICINE

## 2018-08-22 DIAGNOSIS — Z34.03 SUPERVISION OF NORMAL FIRST PREGNANCY IN THIRD TRIMESTER: ICD-10-CM

## 2018-08-28 ENCOUNTER — COMMUNICATION - HEALTHEAST (OUTPATIENT)
Dept: FAMILY MEDICINE | Facility: CLINIC | Age: 23
End: 2018-08-28

## 2018-08-31 ENCOUNTER — COMMUNICATION - HEALTHEAST (OUTPATIENT)
Dept: FAMILY MEDICINE | Facility: CLINIC | Age: 23
End: 2018-08-31

## 2018-09-07 ENCOUNTER — PRENATAL OFFICE VISIT - HEALTHEAST (OUTPATIENT)
Dept: FAMILY MEDICINE | Facility: CLINIC | Age: 23
End: 2018-09-07

## 2018-09-07 DIAGNOSIS — D64.9 ANEMIA: ICD-10-CM

## 2018-09-07 DIAGNOSIS — Z34.03 SUPERVISION OF NORMAL FIRST PREGNANCY IN THIRD TRIMESTER: ICD-10-CM

## 2018-09-07 DIAGNOSIS — G43.009 NONINTRACTABLE MIGRAINE: ICD-10-CM

## 2018-09-07 LAB
ALBUMIN SERPL-MCNC: 2.6 G/DL (ref 3.5–5)
ALP SERPL-CCNC: 131 U/L (ref 45–120)
ALT SERPL W P-5'-P-CCNC: <9 U/L (ref 0–45)
ANION GAP SERPL CALCULATED.3IONS-SCNC: 8 MMOL/L (ref 5–18)
APTT PPP: 29 SECONDS (ref 24–37)
AST SERPL W P-5'-P-CCNC: 12 U/L (ref 0–40)
BASOPHILS # BLD AUTO: 0 THOU/UL (ref 0–0.2)
BASOPHILS NFR BLD AUTO: 0 % (ref 0–2)
BILIRUB SERPL-MCNC: 0.6 MG/DL (ref 0–1)
BUN SERPL-MCNC: 5 MG/DL (ref 8–22)
CALCIUM SERPL-MCNC: 8.6 MG/DL (ref 8.5–10.5)
CHLORIDE BLD-SCNC: 105 MMOL/L (ref 98–107)
CO2 SERPL-SCNC: 24 MMOL/L (ref 22–31)
CREAT SERPL-MCNC: 0.53 MG/DL (ref 0.6–1.1)
EOSINOPHIL # BLD AUTO: 0.1 THOU/UL (ref 0–0.4)
EOSINOPHIL NFR BLD AUTO: 0 % (ref 0–6)
ERYTHROCYTE [DISTWIDTH] IN BLOOD BY AUTOMATED COUNT: 15.2 % (ref 11–14.5)
GFR SERPL CREATININE-BSD FRML MDRD: >60 ML/MIN/1.73M2
GLUCOSE BLD-MCNC: 66 MG/DL (ref 70–125)
HCT VFR BLD AUTO: 31 % (ref 35–47)
HGB BLD-MCNC: 10.6 G/DL (ref 12–16)
INR PPP: 1 (ref 0.9–1.1)
LYMPHOCYTES # BLD AUTO: 3 THOU/UL (ref 0.8–4.4)
LYMPHOCYTES NFR BLD AUTO: 18 % (ref 20–40)
MCH RBC QN AUTO: 28.5 PG (ref 27–34)
MCHC RBC AUTO-ENTMCNC: 34.2 G/DL (ref 32–36)
MCV RBC AUTO: 83 FL (ref 80–100)
MONOCYTES # BLD AUTO: 1.3 THOU/UL (ref 0–0.9)
MONOCYTES NFR BLD AUTO: 8 % (ref 2–10)
NEUTROPHILS # BLD AUTO: 11.7 THOU/UL (ref 2–7.7)
NEUTROPHILS NFR BLD AUTO: 73 % (ref 50–70)
PLATELET # BLD AUTO: 657 THOU/UL (ref 140–440)
PMV BLD AUTO: 10.6 FL (ref 8.5–12.5)
POTASSIUM BLD-SCNC: 4.5 MMOL/L (ref 3.5–5)
PROT SERPL-MCNC: 6.5 G/DL (ref 6–8)
RBC # BLD AUTO: 3.72 MILL/UL (ref 3.8–5.4)
SODIUM SERPL-SCNC: 137 MMOL/L (ref 136–145)
URATE SERPL-MCNC: 3.5 MG/DL (ref 2–7.5)
WBC: 16.3 THOU/UL (ref 4–11)

## 2018-09-08 ENCOUNTER — COMMUNICATION - HEALTHEAST (OUTPATIENT)
Dept: FAMILY MEDICINE | Facility: CLINIC | Age: 23
End: 2018-09-08

## 2018-09-10 ENCOUNTER — COMMUNICATION - HEALTHEAST (OUTPATIENT)
Dept: FAMILY MEDICINE | Facility: CLINIC | Age: 23
End: 2018-09-10

## 2018-09-12 ENCOUNTER — COMMUNICATION - HEALTHEAST (OUTPATIENT)
Dept: ADMINISTRATIVE | Facility: CLINIC | Age: 23
End: 2018-09-12

## 2018-09-14 ENCOUNTER — OFFICE VISIT - HEALTHEAST (OUTPATIENT)
Dept: FAMILY MEDICINE | Facility: CLINIC | Age: 23
End: 2018-09-14

## 2018-09-14 DIAGNOSIS — J06.9 URI (UPPER RESPIRATORY INFECTION): ICD-10-CM

## 2018-09-21 ENCOUNTER — PRENATAL OFFICE VISIT - HEALTHEAST (OUTPATIENT)
Dept: FAMILY MEDICINE | Facility: CLINIC | Age: 23
End: 2018-09-21

## 2018-09-21 DIAGNOSIS — Z23 IMMUNIZATION DUE: ICD-10-CM

## 2018-09-21 DIAGNOSIS — Z34.90 SUPERVISION OF NORMAL PREGNANCY: ICD-10-CM

## 2018-09-21 DIAGNOSIS — N90.89 LABIAL LESION: ICD-10-CM

## 2018-09-21 LAB
ALBUMIN UR-MCNC: ABNORMAL MG/DL
APPEARANCE UR: CLEAR
BILIRUB UR QL STRIP: ABNORMAL
COLOR UR AUTO: YELLOW
GLUCOSE UR STRIP-MCNC: NEGATIVE MG/DL
HGB UR QL STRIP: NEGATIVE
KETONES UR STRIP-MCNC: ABNORMAL MG/DL
LEUKOCYTE ESTERASE UR QL STRIP: ABNORMAL
NITRATE UR QL: NEGATIVE
PH UR STRIP: 7 [PH] (ref 5–8)
SP GR UR STRIP: 1.02 (ref 1–1.03)
UROBILINOGEN UR STRIP-ACNC: ABNORMAL

## 2018-09-22 LAB
ALLERGIC TO PENICILLIN: YES
GP B STREP DNA SPEC QL NAA+PROBE: NEGATIVE
HSV SPECIMEN: NORMAL
HSV1 DNA SPEC QL NAA+PROBE: NEGATIVE
HSV2 DNA SPEC QL NAA+PROBE: NEGATIVE

## 2018-09-28 ENCOUNTER — PRENATAL OFFICE VISIT - HEALTHEAST (OUTPATIENT)
Dept: FAMILY MEDICINE | Facility: CLINIC | Age: 23
End: 2018-09-28

## 2018-09-28 DIAGNOSIS — Z34.03 SUPERVISION OF NORMAL FIRST PREGNANCY IN THIRD TRIMESTER: ICD-10-CM

## 2018-09-28 DIAGNOSIS — Z34.90 SUPERVISION OF NORMAL PREGNANCY: ICD-10-CM

## 2018-09-28 LAB
ALBUMIN UR-MCNC: NEGATIVE MG/DL
APPEARANCE UR: CLEAR
BILIRUB UR QL STRIP: NEGATIVE
COLOR UR AUTO: YELLOW
GLUCOSE UR STRIP-MCNC: NEGATIVE MG/DL
HGB UR QL STRIP: NEGATIVE
KETONES UR STRIP-MCNC: NEGATIVE MG/DL
LEUKOCYTE ESTERASE UR QL STRIP: ABNORMAL
NITRATE UR QL: NEGATIVE
PH UR STRIP: 7 [PH] (ref 5–8)
SP GR UR STRIP: 1.02 (ref 1–1.03)
UROBILINOGEN UR STRIP-ACNC: ABNORMAL

## 2018-10-01 ENCOUNTER — COMMUNICATION - HEALTHEAST (OUTPATIENT)
Dept: FAMILY MEDICINE | Facility: CLINIC | Age: 23
End: 2018-10-01

## 2018-10-03 ENCOUNTER — PRENATAL OFFICE VISIT - HEALTHEAST (OUTPATIENT)
Dept: FAMILY MEDICINE | Facility: CLINIC | Age: 23
End: 2018-10-03

## 2018-10-03 DIAGNOSIS — Z34.90 SUPERVISION OF NORMAL PREGNANCY: ICD-10-CM

## 2018-10-03 LAB
ALBUMIN UR-MCNC: ABNORMAL MG/DL
ALT SERPL W P-5'-P-CCNC: <9 U/L (ref 0–45)
APPEARANCE UR: CLEAR
APTT PPP: 26 SECONDS (ref 24–37)
AST SERPL W P-5'-P-CCNC: 11 U/L (ref 0–40)
BILIRUB UR QL STRIP: ABNORMAL
BUN SERPL-MCNC: 7 MG/DL (ref 8–22)
COLOR UR AUTO: ABNORMAL
CREAT SERPL-MCNC: 0.59 MG/DL (ref 0.6–1.1)
ERYTHROCYTE [DISTWIDTH] IN BLOOD BY AUTOMATED COUNT: 14 % (ref 11–14.5)
GFR SERPL CREATININE-BSD FRML MDRD: >60 ML/MIN/1.73M2
GLUCOSE UR STRIP-MCNC: NEGATIVE MG/DL
HCT VFR BLD AUTO: 30.5 % (ref 35–47)
HGB BLD-MCNC: 10.5 G/DL (ref 12–16)
HGB UR QL STRIP: NEGATIVE
INR PPP: 0.96 (ref 0.9–1.1)
KETONES UR STRIP-MCNC: ABNORMAL MG/DL
LEUKOCYTE ESTERASE UR QL STRIP: ABNORMAL
MCH RBC QN AUTO: 27.2 PG (ref 27–34)
MCHC RBC AUTO-ENTMCNC: 34.6 G/DL (ref 32–36)
MCV RBC AUTO: 79 FL (ref 80–100)
NITRATE UR QL: NEGATIVE
PH UR STRIP: 7 [PH] (ref 5–8)
PLATELET # BLD AUTO: 605 THOU/UL (ref 140–440)
PMV BLD AUTO: 7.9 FL (ref 7–10)
RBC # BLD AUTO: 3.88 MILL/UL (ref 3.8–5.4)
SP GR UR STRIP: 1.02 (ref 1–1.03)
URATE SERPL-MCNC: 4.1 MG/DL (ref 2–7.5)
UROBILINOGEN UR STRIP-ACNC: ABNORMAL
WBC: 15.9 THOU/UL (ref 4–11)

## 2018-10-04 ENCOUNTER — COMMUNICATION - HEALTHEAST (OUTPATIENT)
Dept: FAMILY MEDICINE | Facility: CLINIC | Age: 23
End: 2018-10-04

## 2018-10-05 ENCOUNTER — COMMUNICATION - HEALTHEAST (OUTPATIENT)
Dept: OBGYN | Facility: CLINIC | Age: 23
End: 2018-10-05

## 2018-10-08 ENCOUNTER — PRENATAL OFFICE VISIT - HEALTHEAST (OUTPATIENT)
Dept: FAMILY MEDICINE | Facility: CLINIC | Age: 23
End: 2018-10-08

## 2018-10-08 ENCOUNTER — COMMUNICATION - HEALTHEAST (OUTPATIENT)
Dept: FAMILY MEDICINE | Facility: CLINIC | Age: 23
End: 2018-10-08

## 2018-10-08 DIAGNOSIS — Z34.90 SUPERVISION OF NORMAL PREGNANCY: ICD-10-CM

## 2018-10-08 DIAGNOSIS — S02.5XXA BROKEN TOOTH: ICD-10-CM

## 2018-10-08 LAB
ALBUMIN UR-MCNC: NEGATIVE MG/DL
APPEARANCE UR: CLEAR
BILIRUB UR QL STRIP: NEGATIVE
COLOR UR AUTO: YELLOW
GLUCOSE UR STRIP-MCNC: NEGATIVE MG/DL
HGB UR QL STRIP: NEGATIVE
KETONES UR STRIP-MCNC: NEGATIVE MG/DL
LEUKOCYTE ESTERASE UR QL STRIP: ABNORMAL
NITRATE UR QL: NEGATIVE
PH UR STRIP: 7 [PH] (ref 5–8)
SP GR UR STRIP: 1.01 (ref 1–1.03)
UROBILINOGEN UR STRIP-ACNC: ABNORMAL

## 2018-10-11 ENCOUNTER — ANESTHESIA - HEALTHEAST (OUTPATIENT)
Dept: OBGYN | Facility: CLINIC | Age: 23
End: 2018-10-11

## 2018-10-12 ENCOUNTER — HOME CARE/HOSPICE - HEALTHEAST (OUTPATIENT)
Dept: HOME HEALTH SERVICES | Facility: HOME HEALTH | Age: 23
End: 2018-10-12

## 2018-10-12 ENCOUNTER — COMMUNICATION - HEALTHEAST (OUTPATIENT)
Dept: FAMILY MEDICINE | Facility: CLINIC | Age: 23
End: 2018-10-12

## 2018-10-13 ENCOUNTER — HOME CARE/HOSPICE - HEALTHEAST (OUTPATIENT)
Dept: HOME HEALTH SERVICES | Facility: HOME HEALTH | Age: 23
End: 2018-10-13

## 2018-10-15 ENCOUNTER — COMMUNICATION - HEALTHEAST (OUTPATIENT)
Dept: FAMILY MEDICINE | Facility: CLINIC | Age: 23
End: 2018-10-15

## 2018-11-08 ENCOUNTER — COMMUNICATION - HEALTHEAST (OUTPATIENT)
Dept: FAMILY MEDICINE | Facility: CLINIC | Age: 23
End: 2018-11-08

## 2018-11-28 ENCOUNTER — OFFICE VISIT - HEALTHEAST (OUTPATIENT)
Dept: FAMILY MEDICINE | Facility: CLINIC | Age: 23
End: 2018-11-28

## 2018-11-28 DIAGNOSIS — Z12.4 CERVICAL CANCER SCREENING: ICD-10-CM

## 2018-11-28 DIAGNOSIS — F41.1 GAD (GENERALIZED ANXIETY DISORDER): ICD-10-CM

## 2018-11-29 ENCOUNTER — COMMUNICATION - HEALTHEAST (OUTPATIENT)
Dept: FAMILY MEDICINE | Facility: CLINIC | Age: 23
End: 2018-11-29

## 2018-12-10 ENCOUNTER — OFFICE VISIT - HEALTHEAST (OUTPATIENT)
Dept: FAMILY MEDICINE | Facility: CLINIC | Age: 23
End: 2018-12-10

## 2018-12-10 DIAGNOSIS — F41.9 ANXIETY: ICD-10-CM

## 2018-12-13 ENCOUNTER — COMMUNICATION - HEALTHEAST (OUTPATIENT)
Dept: FAMILY MEDICINE | Facility: CLINIC | Age: 23
End: 2018-12-13

## 2018-12-13 DIAGNOSIS — F41.0 PANIC DISORDER: ICD-10-CM

## 2019-01-01 ENCOUNTER — COMMUNICATION - HEALTHEAST (OUTPATIENT)
Dept: FAMILY MEDICINE | Facility: CLINIC | Age: 24
End: 2019-01-01

## 2019-01-16 ENCOUNTER — OFFICE VISIT - HEALTHEAST (OUTPATIENT)
Dept: FAMILY MEDICINE | Facility: CLINIC | Age: 24
End: 2019-01-16

## 2019-01-16 ENCOUNTER — HOSPITAL ENCOUNTER (OUTPATIENT)
Dept: LAB | Age: 24
Setting detail: SPECIMEN
Discharge: HOME | End: 2019-01-16

## 2019-01-16 DIAGNOSIS — J02.9 SORE THROAT: ICD-10-CM

## 2019-01-16 LAB — DEPRECATED S PYO AG THROAT QL EIA: NORMAL

## 2019-01-16 ASSESSMENT — MIFFLIN-ST. JEOR: SCORE: 1427.21

## 2019-01-17 LAB — GROUP A STREP BY PCR: NORMAL

## 2019-01-18 ENCOUNTER — AMBULATORY - HEALTHEAST (OUTPATIENT)
Dept: FAMILY MEDICINE | Facility: CLINIC | Age: 24
End: 2019-01-18

## 2019-01-18 DIAGNOSIS — J02.9 ACUTE SORE THROAT: ICD-10-CM

## 2019-01-27 ENCOUNTER — COMMUNICATION - HEALTHEAST (OUTPATIENT)
Dept: FAMILY MEDICINE | Facility: CLINIC | Age: 24
End: 2019-01-27

## 2019-01-27 DIAGNOSIS — F41.9 ANXIETY: ICD-10-CM

## 2019-01-27 DIAGNOSIS — F41.0 PANIC DISORDER: ICD-10-CM

## 2019-02-01 ENCOUNTER — COMMUNICATION - HEALTHEAST (OUTPATIENT)
Dept: FAMILY MEDICINE | Facility: CLINIC | Age: 24
End: 2019-02-01

## 2019-02-01 DIAGNOSIS — F41.9 ANXIETY: ICD-10-CM

## 2019-02-01 DIAGNOSIS — F41.0 PANIC DISORDER: ICD-10-CM

## 2019-02-05 ENCOUNTER — COMMUNICATION - HEALTHEAST (OUTPATIENT)
Dept: FAMILY MEDICINE | Facility: CLINIC | Age: 24
End: 2019-02-05

## 2019-02-18 ENCOUNTER — COMMUNICATION - HEALTHEAST (OUTPATIENT)
Dept: FAMILY MEDICINE | Facility: CLINIC | Age: 24
End: 2019-02-18

## 2019-04-16 ENCOUNTER — AMBULATORY - HEALTHEAST (OUTPATIENT)
Dept: FAMILY MEDICINE | Facility: CLINIC | Age: 24
End: 2019-04-16

## 2019-04-16 DIAGNOSIS — F41.0 PANIC DISORDER: ICD-10-CM

## 2019-05-05 ENCOUNTER — COMMUNICATION - HEALTHEAST (OUTPATIENT)
Dept: FAMILY MEDICINE | Facility: CLINIC | Age: 24
End: 2019-05-05

## 2019-05-05 DIAGNOSIS — F41.9 ANXIETY: ICD-10-CM

## 2019-05-24 ENCOUNTER — OFFICE VISIT - HEALTHEAST (OUTPATIENT)
Dept: FAMILY MEDICINE | Facility: CLINIC | Age: 24
End: 2019-05-24

## 2019-05-24 DIAGNOSIS — K08.89 PAIN, DENTAL: ICD-10-CM

## 2019-05-24 ASSESSMENT — MIFFLIN-ST. JEOR: SCORE: 1368.24

## 2019-05-25 ENCOUNTER — COMMUNICATION - HEALTHEAST (OUTPATIENT)
Dept: SCHEDULING | Facility: CLINIC | Age: 24
End: 2019-05-25

## 2019-05-25 DIAGNOSIS — K08.89 PAIN, DENTAL: ICD-10-CM

## 2019-05-28 ENCOUNTER — COMMUNICATION - HEALTHEAST (OUTPATIENT)
Dept: FAMILY MEDICINE | Facility: CLINIC | Age: 24
End: 2019-05-28

## 2019-05-28 DIAGNOSIS — K08.89 PAIN, DENTAL: ICD-10-CM

## 2019-06-11 ENCOUNTER — OFFICE VISIT - HEALTHEAST (OUTPATIENT)
Dept: FAMILY MEDICINE | Facility: CLINIC | Age: 24
End: 2019-06-11

## 2019-06-11 DIAGNOSIS — F41.1 GAD (GENERALIZED ANXIETY DISORDER): ICD-10-CM

## 2019-06-11 DIAGNOSIS — F41.0 PANIC DISORDER: ICD-10-CM

## 2019-06-11 DIAGNOSIS — F32.1 MODERATE MAJOR DEPRESSION (H): ICD-10-CM

## 2019-06-11 DIAGNOSIS — D58.0 HEREDITARY SPHEROCYTOSIS (H): ICD-10-CM

## 2019-06-11 DIAGNOSIS — K08.89 PAIN, DENTAL: ICD-10-CM

## 2019-06-21 ENCOUNTER — COMMUNICATION - HEALTHEAST (OUTPATIENT)
Dept: FAMILY MEDICINE | Facility: CLINIC | Age: 24
End: 2019-06-21

## 2019-06-21 DIAGNOSIS — K08.89 PAIN, DENTAL: ICD-10-CM

## 2019-07-17 ENCOUNTER — COMMUNICATION - HEALTHEAST (OUTPATIENT)
Dept: FAMILY MEDICINE | Facility: CLINIC | Age: 24
End: 2019-07-17

## 2019-07-30 ENCOUNTER — OFFICE VISIT - HEALTHEAST (OUTPATIENT)
Dept: FAMILY MEDICINE | Facility: CLINIC | Age: 24
End: 2019-07-30

## 2019-07-30 DIAGNOSIS — F41.9 ANXIETY: ICD-10-CM

## 2019-08-05 ENCOUNTER — AMBULATORY - HEALTHEAST (OUTPATIENT)
Dept: FAMILY MEDICINE | Facility: CLINIC | Age: 24
End: 2019-08-05

## 2019-08-05 DIAGNOSIS — Q89.01 ASPLENIA: ICD-10-CM

## 2019-08-05 DIAGNOSIS — D58.0 HEREDITARY SPHEROCYTOSIS (H): ICD-10-CM

## 2019-08-05 DIAGNOSIS — Z23 IMMUNIZATION DUE: ICD-10-CM

## 2019-09-10 ENCOUNTER — COMMUNICATION - HEALTHEAST (OUTPATIENT)
Dept: FAMILY MEDICINE | Facility: CLINIC | Age: 24
End: 2019-09-10

## 2019-09-10 DIAGNOSIS — R11.10 VOMITING, INTRACTABILITY OF VOMITING NOT SPECIFIED, PRESENCE OF NAUSEA NOT SPECIFIED, UNSPECIFIED VOMITING TYPE: ICD-10-CM

## 2019-10-14 ENCOUNTER — COMMUNICATION - HEALTHEAST (OUTPATIENT)
Dept: FAMILY MEDICINE | Facility: CLINIC | Age: 24
End: 2019-10-14

## 2019-10-15 ENCOUNTER — AMBULATORY - HEALTHEAST (OUTPATIENT)
Dept: FAMILY MEDICINE | Facility: CLINIC | Age: 24
End: 2019-10-15

## 2019-10-15 DIAGNOSIS — F41.9 ANXIETY: ICD-10-CM

## 2019-10-16 ENCOUNTER — COMMUNICATION - HEALTHEAST (OUTPATIENT)
Dept: CARE COORDINATION | Facility: CLINIC | Age: 24
End: 2019-10-16

## 2019-10-16 ENCOUNTER — OFFICE VISIT - HEALTHEAST (OUTPATIENT)
Dept: FAMILY MEDICINE | Facility: CLINIC | Age: 24
End: 2019-10-16

## 2019-10-16 DIAGNOSIS — R74.8 ELEVATED LIVER ENZYMES: ICD-10-CM

## 2019-10-16 DIAGNOSIS — R65.10 SIRS (SYSTEMIC INFLAMMATORY RESPONSE SYNDROME) (H): ICD-10-CM

## 2019-10-16 DIAGNOSIS — R00.2 PALPITATIONS: ICD-10-CM

## 2019-10-16 DIAGNOSIS — D58.0 HEREDITARY SPHEROCYTOSIS (H): ICD-10-CM

## 2019-10-16 LAB
ALBUMIN SERPL-MCNC: 4.1 G/DL (ref 3.5–5)
ALP SERPL-CCNC: 62 U/L (ref 45–120)
ALT SERPL W P-5'-P-CCNC: 39 U/L (ref 0–45)
ANION GAP SERPL CALCULATED.3IONS-SCNC: 10 MMOL/L (ref 5–18)
AST SERPL W P-5'-P-CCNC: 15 U/L (ref 0–40)
BILIRUB SERPL-MCNC: 0.9 MG/DL (ref 0–1)
BUN SERPL-MCNC: 9 MG/DL (ref 8–22)
CALCIUM SERPL-MCNC: 9.4 MG/DL (ref 8.5–10.5)
CHLORIDE BLD-SCNC: 101 MMOL/L (ref 98–107)
CO2 SERPL-SCNC: 27 MMOL/L (ref 22–31)
CREAT SERPL-MCNC: 0.64 MG/DL (ref 0.6–1.1)
ERYTHROCYTE [DISTWIDTH] IN BLOOD BY AUTOMATED COUNT: 11.6 % (ref 11–14.5)
GFR SERPL CREATININE-BSD FRML MDRD: >60 ML/MIN/1.73M2
GLUCOSE BLD-MCNC: 84 MG/DL (ref 70–125)
HCT VFR BLD AUTO: 38.7 % (ref 35–47)
HGB BLD-MCNC: 13.8 G/DL (ref 12–16)
MCH RBC QN AUTO: 31 PG (ref 27–34)
MCHC RBC AUTO-ENTMCNC: 35.7 G/DL (ref 32–36)
MCV RBC AUTO: 87 FL (ref 80–100)
PLATELET # BLD AUTO: 644 THOU/UL (ref 140–440)
PMV BLD AUTO: 6.3 FL (ref 7–10)
POTASSIUM BLD-SCNC: 4.3 MMOL/L (ref 3.5–5)
PROT SERPL-MCNC: 7.6 G/DL (ref 6–8)
RBC # BLD AUTO: 4.45 MILL/UL (ref 3.8–5.4)
SODIUM SERPL-SCNC: 138 MMOL/L (ref 136–145)
TSH SERPL DL<=0.005 MIU/L-ACNC: 0.54 UIU/ML (ref 0.3–5)
WBC: 6 THOU/UL (ref 4–11)

## 2019-10-16 ASSESSMENT — MIFFLIN-ST. JEOR: SCORE: 1386.38

## 2019-10-22 ENCOUNTER — HOSPITAL ENCOUNTER (OUTPATIENT)
Dept: CARDIOLOGY | Facility: CLINIC | Age: 24
Discharge: HOME | End: 2019-10-22

## 2019-10-22 DIAGNOSIS — R00.2 PALPITATIONS: ICD-10-CM

## 2019-11-21 ENCOUNTER — AMBULATORY - HEALTHEAST (OUTPATIENT)
Dept: CARDIOLOGY | Facility: CLINIC | Age: 24
End: 2019-11-21

## 2019-12-02 ENCOUNTER — COMMUNICATION - HEALTHEAST (OUTPATIENT)
Dept: FAMILY MEDICINE | Facility: CLINIC | Age: 24
End: 2019-12-02

## 2019-12-02 DIAGNOSIS — F41.9 ANXIETY: ICD-10-CM

## 2020-01-01 ENCOUNTER — COMMUNICATION - HEALTHEAST (OUTPATIENT)
Dept: FAMILY MEDICINE | Facility: CLINIC | Age: 25
End: 2020-01-01

## 2020-01-14 ENCOUNTER — AMBULATORY - HEALTHEAST (OUTPATIENT)
Dept: FAMILY MEDICINE | Facility: CLINIC | Age: 25
End: 2020-01-14

## 2020-01-14 ENCOUNTER — OFFICE VISIT - HEALTHEAST (OUTPATIENT)
Dept: FAMILY MEDICINE | Facility: CLINIC | Age: 25
End: 2020-01-14

## 2020-01-14 DIAGNOSIS — F41.1 GAD (GENERALIZED ANXIETY DISORDER): ICD-10-CM

## 2020-01-14 DIAGNOSIS — F41.9 ANXIETY: ICD-10-CM

## 2020-01-14 DIAGNOSIS — R00.2 PALPITATIONS: ICD-10-CM

## 2020-01-14 LAB
BASOPHILS # BLD AUTO: 0.1 THOU/UL (ref 0–0.2)
BASOPHILS NFR BLD AUTO: 1 % (ref 0–2)
EOSINOPHIL # BLD AUTO: 0.2 THOU/UL (ref 0–0.4)
EOSINOPHIL NFR BLD AUTO: 2 % (ref 0–6)
ERYTHROCYTE [DISTWIDTH] IN BLOOD BY AUTOMATED COUNT: 12.2 % (ref 11–14.5)
ERYTHROCYTE [SEDIMENTATION RATE] IN BLOOD BY WESTERGREN METHOD: 8 MM/HR (ref 0–20)
HCT VFR BLD AUTO: 39.9 % (ref 35–47)
HGB BLD-MCNC: 14.1 G/DL (ref 12–16)
LYMPHOCYTES # BLD AUTO: 2.7 THOU/UL (ref 0.8–4.4)
LYMPHOCYTES NFR BLD AUTO: 25 % (ref 20–40)
MCH RBC QN AUTO: 31.4 PG (ref 27–34)
MCHC RBC AUTO-ENTMCNC: 35.2 G/DL (ref 32–36)
MCV RBC AUTO: 89 FL (ref 80–100)
MONOCYTES # BLD AUTO: 0.7 THOU/UL (ref 0–0.9)
MONOCYTES NFR BLD AUTO: 6 % (ref 2–10)
NEUTROPHILS # BLD AUTO: 7 THOU/UL (ref 2–7.7)
NEUTROPHILS NFR BLD AUTO: 66 % (ref 50–70)
PLATELET # BLD AUTO: 657 THOU/UL (ref 140–440)
PMV BLD AUTO: 6.7 FL (ref 7–10)
RBC # BLD AUTO: 4.48 MILL/UL (ref 3.8–5.4)
WBC: 10.6 THOU/UL (ref 4–11)

## 2020-01-14 ASSESSMENT — ANXIETY QUESTIONNAIRES
5. BEING SO RESTLESS THAT IT IS HARD TO SIT STILL: SEVERAL DAYS
6. BECOMING EASILY ANNOYED OR IRRITABLE: NEARLY EVERY DAY
1. FEELING NERVOUS, ANXIOUS, OR ON EDGE: NEARLY EVERY DAY
IF YOU CHECKED OFF ANY PROBLEMS ON THIS QUESTIONNAIRE, HOW DIFFICULT HAVE THESE PROBLEMS MADE IT FOR YOU TO DO YOUR WORK, TAKE CARE OF THINGS AT HOME, OR GET ALONG WITH OTHER PEOPLE: VERY DIFFICULT
GAD7 TOTAL SCORE: 17
2. NOT BEING ABLE TO STOP OR CONTROL WORRYING: MORE THAN HALF THE DAYS
7. FEELING AFRAID AS IF SOMETHING AWFUL MIGHT HAPPEN: NEARLY EVERY DAY
4. TROUBLE RELAXING: NEARLY EVERY DAY
3. WORRYING TOO MUCH ABOUT DIFFERENT THINGS: MORE THAN HALF THE DAYS

## 2020-01-14 ASSESSMENT — PATIENT HEALTH QUESTIONNAIRE - PHQ9: SUM OF ALL RESPONSES TO PHQ QUESTIONS 1-9: 22

## 2020-01-15 LAB
ALBUMIN SERPL-MCNC: 4.2 G/DL (ref 3.5–5)
ALP SERPL-CCNC: 49 U/L (ref 45–120)
ALT SERPL W P-5'-P-CCNC: <9 U/L (ref 0–45)
ANION GAP SERPL CALCULATED.3IONS-SCNC: 9 MMOL/L (ref 5–18)
AST SERPL W P-5'-P-CCNC: 10 U/L (ref 0–40)
BILIRUB SERPL-MCNC: 0.9 MG/DL (ref 0–1)
BUN SERPL-MCNC: 10 MG/DL (ref 8–22)
C REACTIVE PROTEIN LHE: <0.1 MG/DL (ref 0–0.8)
CALCIUM SERPL-MCNC: 9.6 MG/DL (ref 8.5–10.5)
CHLORIDE BLD-SCNC: 104 MMOL/L (ref 98–107)
CO2 SERPL-SCNC: 25 MMOL/L (ref 22–31)
CREAT SERPL-MCNC: 0.6 MG/DL (ref 0.6–1.1)
GFR SERPL CREATININE-BSD FRML MDRD: >60 ML/MIN/1.73M2
GLUCOSE BLD-MCNC: 80 MG/DL (ref 70–125)
POTASSIUM BLD-SCNC: 4.3 MMOL/L (ref 3.5–5)
PROT SERPL-MCNC: 7.5 G/DL (ref 6–8)
SODIUM SERPL-SCNC: 138 MMOL/L (ref 136–145)

## 2020-01-16 ENCOUNTER — AMBULATORY - HEALTHEAST (OUTPATIENT)
Dept: FAMILY MEDICINE | Facility: CLINIC | Age: 25
End: 2020-01-16

## 2020-01-16 DIAGNOSIS — D75.839 THROMBOCYTHEMIA: ICD-10-CM

## 2020-01-26 ENCOUNTER — COMMUNICATION - HEALTHEAST (OUTPATIENT)
Dept: FAMILY MEDICINE | Facility: CLINIC | Age: 25
End: 2020-01-26

## 2020-02-05 ENCOUNTER — AMBULATORY - HEALTHEAST (OUTPATIENT)
Dept: OTHER | Facility: CLINIC | Age: 25
End: 2020-02-05

## 2020-02-05 ENCOUNTER — OFFICE VISIT - HEALTHEAST (OUTPATIENT)
Dept: FAMILY MEDICINE | Facility: CLINIC | Age: 25
End: 2020-02-05

## 2020-02-05 DIAGNOSIS — F41.9 ANXIETY: ICD-10-CM

## 2020-03-20 ENCOUNTER — COMMUNICATION - HEALTHEAST (OUTPATIENT)
Dept: FAMILY MEDICINE | Facility: CLINIC | Age: 25
End: 2020-03-20

## 2020-03-20 DIAGNOSIS — F41.1 GAD (GENERALIZED ANXIETY DISORDER): ICD-10-CM

## 2020-03-23 ENCOUNTER — COMMUNICATION - HEALTHEAST (OUTPATIENT)
Dept: FAMILY MEDICINE | Facility: CLINIC | Age: 25
End: 2020-03-23

## 2020-03-31 ENCOUNTER — COMMUNICATION - HEALTHEAST (OUTPATIENT)
Dept: FAMILY MEDICINE | Facility: CLINIC | Age: 25
End: 2020-03-31

## 2020-04-06 ENCOUNTER — COMMUNICATION - HEALTHEAST (OUTPATIENT)
Dept: FAMILY MEDICINE | Facility: CLINIC | Age: 25
End: 2020-04-06

## 2020-04-08 ENCOUNTER — OFFICE VISIT - HEALTHEAST (OUTPATIENT)
Dept: FAMILY MEDICINE | Facility: CLINIC | Age: 25
End: 2020-04-08

## 2020-04-08 DIAGNOSIS — O21.9 NAUSEA AND VOMITING DURING PREGNANCY: ICD-10-CM

## 2020-04-08 DIAGNOSIS — F41.1 GAD (GENERALIZED ANXIETY DISORDER): ICD-10-CM

## 2020-04-08 DIAGNOSIS — Z32.01 PREGNANCY TEST POSITIVE: ICD-10-CM

## 2020-04-21 ENCOUNTER — HOSPITAL ENCOUNTER (OUTPATIENT)
Dept: ULTRASOUND IMAGING | Facility: CLINIC | Age: 25
Discharge: HOME | End: 2020-04-21
Attending: FAMILY MEDICINE

## 2020-04-21 ENCOUNTER — COMMUNICATION - HEALTHEAST (OUTPATIENT)
Dept: FAMILY MEDICINE | Facility: CLINIC | Age: 25
End: 2020-04-21

## 2020-05-25 ENCOUNTER — COMMUNICATION - HEALTHEAST (OUTPATIENT)
Dept: FAMILY MEDICINE | Facility: CLINIC | Age: 25
End: 2020-05-25

## 2020-05-25 DIAGNOSIS — F41.1 GAD (GENERALIZED ANXIETY DISORDER): ICD-10-CM

## 2020-05-26 ENCOUNTER — COMMUNICATION - HEALTHEAST (OUTPATIENT)
Dept: FAMILY MEDICINE | Facility: CLINIC | Age: 25
End: 2020-05-26

## 2020-05-26 DIAGNOSIS — F41.1 GAD (GENERALIZED ANXIETY DISORDER): ICD-10-CM

## 2020-05-27 ENCOUNTER — COMMUNICATION - HEALTHEAST (OUTPATIENT)
Dept: FAMILY MEDICINE | Facility: CLINIC | Age: 25
End: 2020-05-27

## 2020-06-02 ENCOUNTER — OFFICE VISIT - HEALTHEAST (OUTPATIENT)
Dept: FAMILY MEDICINE | Facility: CLINIC | Age: 25
End: 2020-06-02

## 2020-06-02 DIAGNOSIS — Z30.09 ENCOUNTER FOR COUNSELING REGARDING CONTRACEPTION: ICD-10-CM

## 2020-06-22 ENCOUNTER — OFFICE VISIT - HEALTHEAST (OUTPATIENT)
Dept: FAMILY MEDICINE | Facility: CLINIC | Age: 25
End: 2020-06-22

## 2020-06-22 DIAGNOSIS — Z30.017 NEXPLANON INSERTION: ICD-10-CM

## 2020-06-22 LAB — HCG UR QL: NEGATIVE

## 2020-06-22 ASSESSMENT — MIFFLIN-ST. JEOR: SCORE: 1427.21

## 2020-07-06 ENCOUNTER — COMMUNICATION - HEALTHEAST (OUTPATIENT)
Dept: FAMILY MEDICINE | Facility: CLINIC | Age: 25
End: 2020-07-06

## 2020-09-20 ENCOUNTER — COMMUNICATION - HEALTHEAST (OUTPATIENT)
Dept: FAMILY MEDICINE | Facility: CLINIC | Age: 25
End: 2020-09-20

## 2020-09-22 ENCOUNTER — COMMUNICATION - HEALTHEAST (OUTPATIENT)
Dept: FAMILY MEDICINE | Facility: CLINIC | Age: 25
End: 2020-09-22

## 2020-09-22 DIAGNOSIS — F41.1 GAD (GENERALIZED ANXIETY DISORDER): ICD-10-CM

## 2020-10-05 ENCOUNTER — OFFICE VISIT - HEALTHEAST (OUTPATIENT)
Dept: FAMILY MEDICINE | Facility: CLINIC | Age: 25
End: 2020-10-05

## 2020-10-05 DIAGNOSIS — Z30.46 ENCOUNTER FOR NEXPLANON REMOVAL: ICD-10-CM

## 2020-10-05 DIAGNOSIS — Z23 NEED FOR VACCINATION: ICD-10-CM

## 2020-10-05 ASSESSMENT — MIFFLIN-ST. JEOR: SCORE: 1460.37

## 2020-12-03 ENCOUNTER — COMMUNICATION - HEALTHEAST (OUTPATIENT)
Dept: FAMILY MEDICINE | Facility: CLINIC | Age: 25
End: 2020-12-03

## 2020-12-03 ENCOUNTER — AMBULATORY - HEALTHEAST (OUTPATIENT)
Dept: FAMILY MEDICINE | Facility: CLINIC | Age: 25
End: 2020-12-03

## 2020-12-03 DIAGNOSIS — Z20.822 EXPOSURE TO COVID-19 VIRUS: ICD-10-CM

## 2020-12-03 DIAGNOSIS — R09.81 NASAL CONGESTION: ICD-10-CM

## 2020-12-03 DIAGNOSIS — D75.839 THROMBOCYTHEMIA: ICD-10-CM

## 2020-12-03 LAB
BASOPHILS # BLD AUTO: 0.1 THOU/UL (ref 0–0.2)
BASOPHILS NFR BLD AUTO: 1 % (ref 0–2)
EOSINOPHIL # BLD AUTO: 0.1 THOU/UL (ref 0–0.4)
EOSINOPHIL NFR BLD AUTO: 1 % (ref 0–6)
ERYTHROCYTE [DISTWIDTH] IN BLOOD BY AUTOMATED COUNT: 11 % (ref 11–14.5)
FERRITIN SERPL-MCNC: 85 NG/ML (ref 10–130)
HCT VFR BLD AUTO: 39.6 % (ref 35–47)
HGB BLD-MCNC: 13.7 G/DL (ref 12–16)
IRON SATN MFR SERPL: 27 % (ref 20–50)
IRON SERPL-MCNC: 90 UG/DL (ref 42–175)
LYMPHOCYTES # BLD AUTO: 2.4 THOU/UL (ref 0.8–4.4)
LYMPHOCYTES NFR BLD AUTO: 29 % (ref 20–40)
MCH RBC QN AUTO: 31.3 PG (ref 27–34)
MCHC RBC AUTO-ENTMCNC: 34.6 G/DL (ref 32–36)
MCV RBC AUTO: 90 FL (ref 80–100)
MONOCYTES # BLD AUTO: 0.5 THOU/UL (ref 0–0.9)
MONOCYTES NFR BLD AUTO: 6 % (ref 2–10)
NEUTROPHILS # BLD AUTO: 5.2 THOU/UL (ref 2–7.7)
NEUTROPHILS NFR BLD AUTO: 64 % (ref 50–70)
PLATELET # BLD AUTO: 565 THOU/UL (ref 140–440)
PMV BLD AUTO: 6.3 FL (ref 7–10)
RBC # BLD AUTO: 4.39 MILL/UL (ref 3.8–5.4)
TIBC SERPL-MCNC: 335 UG/DL (ref 313–563)
TRANSFERRIN SERPL-MCNC: 268 MG/DL (ref 212–360)
WBC: 8.1 THOU/UL (ref 4–11)

## 2020-12-04 ENCOUNTER — OFFICE VISIT - HEALTHEAST (OUTPATIENT)
Dept: FAMILY MEDICINE | Facility: CLINIC | Age: 25
End: 2020-12-04

## 2020-12-04 ENCOUNTER — COMMUNICATION - HEALTHEAST (OUTPATIENT)
Dept: SCHEDULING | Facility: CLINIC | Age: 25
End: 2020-12-04

## 2020-12-04 DIAGNOSIS — Z20.822 EXPOSURE TO COVID-19 VIRUS: ICD-10-CM

## 2020-12-04 DIAGNOSIS — F41.1 GAD (GENERALIZED ANXIETY DISORDER): ICD-10-CM

## 2020-12-04 DIAGNOSIS — Z20.822 SUSPECTED 2019 NOVEL CORONAVIRUS INFECTION: ICD-10-CM

## 2020-12-04 DIAGNOSIS — Q89.01 ASPLENIA: ICD-10-CM

## 2020-12-04 LAB
BASOPHILS # BLD AUTO: 0 THOU/UL (ref 0–0.2)
BASOPHILS NFR BLD AUTO: 0 % (ref 0–2)
EOSINOPHIL # BLD AUTO: 0 THOU/UL (ref 0–0.4)
EOSINOPHIL NFR BLD AUTO: 0 % (ref 0–6)
ERYTHROCYTE [DISTWIDTH] IN BLOOD BY AUTOMATED COUNT: 12.7 % (ref 11–14.5)
HCT VFR BLD AUTO: 37.3 % (ref 35–47)
HGB BLD-MCNC: 13.2 G/DL (ref 12–16)
IMM GRANULOCYTES # BLD: 0 THOU/UL
IMM GRANULOCYTES NFR BLD: 0 %
LAB AP CHARGES (HE HISTORICAL CONVERSION): NORMAL
LYMPHOCYTES # BLD AUTO: 2.2 THOU/UL (ref 0.8–4.4)
LYMPHOCYTES NFR BLD AUTO: 28 % (ref 20–40)
MCH RBC QN AUTO: 30.8 PG (ref 27–34)
MCHC RBC AUTO-ENTMCNC: 35.4 G/DL (ref 32–36)
MCV RBC AUTO: 87 FL (ref 80–100)
MONOCYTES # BLD AUTO: 0.6 THOU/UL (ref 0–0.9)
MONOCYTES NFR BLD AUTO: 7 % (ref 2–10)
NEUTROPHILS # BLD AUTO: 5 THOU/UL (ref 2–7.7)
NEUTROPHILS NFR BLD AUTO: 64 % (ref 50–70)
PATH REPORT.COMMENTS IMP SPEC: NORMAL
PATH REPORT.COMMENTS IMP SPEC: NORMAL
PATH REPORT.FINAL DX SPEC: NORMAL
PATH REPORT.MICROSCOPIC SPEC OTHER STN: ABNORMAL
PATH REPORT.RELEVANT HX SPEC: NORMAL
PLATELET # BLD AUTO: 557 THOU/UL (ref 140–440)
PMV BLD AUTO: 9.1 FL (ref 8.5–12.5)
RBC # BLD AUTO: 4.29 MILL/UL (ref 3.8–5.4)
WBC: 7.8 THOU/UL (ref 4–11)

## 2020-12-05 ENCOUNTER — COMMUNICATION - HEALTHEAST (OUTPATIENT)
Dept: FAMILY MEDICINE | Facility: CLINIC | Age: 25
End: 2020-12-05

## 2020-12-05 ENCOUNTER — COMMUNICATION - HEALTHEAST (OUTPATIENT)
Dept: SCHEDULING | Facility: CLINIC | Age: 25
End: 2020-12-05

## 2020-12-06 ENCOUNTER — COMMUNICATION - HEALTHEAST (OUTPATIENT)
Dept: FAMILY MEDICINE | Facility: CLINIC | Age: 25
End: 2020-12-06

## 2020-12-07 ENCOUNTER — COMMUNICATION - HEALTHEAST (OUTPATIENT)
Dept: FAMILY MEDICINE | Facility: CLINIC | Age: 25
End: 2020-12-07

## 2020-12-08 ENCOUNTER — COMMUNICATION - HEALTHEAST (OUTPATIENT)
Dept: FAMILY MEDICINE | Facility: CLINIC | Age: 25
End: 2020-12-08

## 2020-12-08 DIAGNOSIS — F41.1 GAD (GENERALIZED ANXIETY DISORDER): ICD-10-CM

## 2020-12-08 DIAGNOSIS — Q89.01 ASPLENIA: ICD-10-CM

## 2020-12-09 ENCOUNTER — AMBULATORY - HEALTHEAST (OUTPATIENT)
Dept: CARE COORDINATION | Facility: CLINIC | Age: 25
End: 2020-12-09

## 2020-12-09 DIAGNOSIS — U07.1 2019 NOVEL CORONAVIRUS DISEASE (COVID-19): ICD-10-CM

## 2020-12-10 ENCOUNTER — COMMUNICATION - HEALTHEAST (OUTPATIENT)
Dept: NURSING | Facility: CLINIC | Age: 25
End: 2020-12-10

## 2020-12-14 ENCOUNTER — COMMUNICATION - HEALTHEAST (OUTPATIENT)
Dept: FAMILY MEDICINE | Facility: CLINIC | Age: 25
End: 2020-12-14

## 2020-12-18 ENCOUNTER — OFFICE VISIT - HEALTHEAST (OUTPATIENT)
Dept: FAMILY MEDICINE | Facility: CLINIC | Age: 25
End: 2020-12-18

## 2020-12-18 DIAGNOSIS — D58.0 HEREDITARY SPHEROCYTOSIS (H): ICD-10-CM

## 2020-12-18 DIAGNOSIS — R00.2 PALPITATIONS: ICD-10-CM

## 2020-12-18 DIAGNOSIS — D75.839 THROMBOCYTHEMIA: ICD-10-CM

## 2020-12-18 DIAGNOSIS — F41.1 GAD (GENERALIZED ANXIETY DISORDER): ICD-10-CM

## 2020-12-18 LAB
ALBUMIN SERPL-MCNC: 4.1 G/DL (ref 3.5–5)
ALP SERPL-CCNC: 51 U/L (ref 45–120)
ALT SERPL W P-5'-P-CCNC: 9 U/L (ref 0–45)
ANION GAP SERPL CALCULATED.3IONS-SCNC: 10 MMOL/L (ref 5–18)
AST SERPL W P-5'-P-CCNC: 13 U/L (ref 0–40)
BASOPHILS # BLD AUTO: 0.1 THOU/UL (ref 0–0.2)
BASOPHILS NFR BLD AUTO: 1 % (ref 0–2)
BILIRUB SERPL-MCNC: 1.2 MG/DL (ref 0–1)
BUN SERPL-MCNC: 9 MG/DL (ref 8–22)
CALCIUM SERPL-MCNC: 9.1 MG/DL (ref 8.5–10.5)
CHLORIDE BLD-SCNC: 104 MMOL/L (ref 98–107)
CO2 SERPL-SCNC: 25 MMOL/L (ref 22–31)
CREAT SERPL-MCNC: 0.61 MG/DL (ref 0.6–1.1)
EOSINOPHIL # BLD AUTO: 0.1 THOU/UL (ref 0–0.4)
EOSINOPHIL NFR BLD AUTO: 1 % (ref 0–6)
ERYTHROCYTE [DISTWIDTH] IN BLOOD BY AUTOMATED COUNT: 11.4 % (ref 11–14.5)
GFR SERPL CREATININE-BSD FRML MDRD: >60 ML/MIN/1.73M2
GLUCOSE BLD-MCNC: 80 MG/DL (ref 70–125)
HCT VFR BLD AUTO: 40 % (ref 35–47)
HGB BLD-MCNC: 13.7 G/DL (ref 12–16)
LYMPHOCYTES # BLD AUTO: 2.1 THOU/UL (ref 0.8–4.4)
LYMPHOCYTES NFR BLD AUTO: 23 % (ref 20–40)
MAGNESIUM SERPL-MCNC: 2 MG/DL (ref 1.8–2.6)
MCH RBC QN AUTO: 31 PG (ref 27–34)
MCHC RBC AUTO-ENTMCNC: 34.3 G/DL (ref 32–36)
MCV RBC AUTO: 90 FL (ref 80–100)
MONOCYTES # BLD AUTO: 0.7 THOU/UL (ref 0–0.9)
MONOCYTES NFR BLD AUTO: 8 % (ref 2–10)
NEUTROPHILS # BLD AUTO: 6.4 THOU/UL (ref 2–7.7)
NEUTROPHILS NFR BLD AUTO: 68 % (ref 50–70)
PLATELET # BLD AUTO: 606 THOU/UL (ref 140–440)
PMV BLD AUTO: 6.7 FL (ref 7–10)
POTASSIUM BLD-SCNC: 4.5 MMOL/L (ref 3.5–5)
PROT SERPL-MCNC: 7.6 G/DL (ref 6–8)
RBC # BLD AUTO: 4.43 MILL/UL (ref 3.8–5.4)
SODIUM SERPL-SCNC: 139 MMOL/L (ref 136–145)
TSH SERPL DL<=0.005 MIU/L-ACNC: 0.86 UIU/ML (ref 0.3–5)
WBC: 9.3 THOU/UL (ref 4–11)

## 2020-12-18 ASSESSMENT — ANXIETY QUESTIONNAIRES
6. BECOMING EASILY ANNOYED OR IRRITABLE: MORE THAN HALF THE DAYS
4. TROUBLE RELAXING: NEARLY EVERY DAY
2. NOT BEING ABLE TO STOP OR CONTROL WORRYING: NEARLY EVERY DAY
5. BEING SO RESTLESS THAT IT IS HARD TO SIT STILL: MORE THAN HALF THE DAYS
IF YOU CHECKED OFF ANY PROBLEMS ON THIS QUESTIONNAIRE, HOW DIFFICULT HAVE THESE PROBLEMS MADE IT FOR YOU TO DO YOUR WORK, TAKE CARE OF THINGS AT HOME, OR GET ALONG WITH OTHER PEOPLE: VERY DIFFICULT
1. FEELING NERVOUS, ANXIOUS, OR ON EDGE: NEARLY EVERY DAY
3. WORRYING TOO MUCH ABOUT DIFFERENT THINGS: NEARLY EVERY DAY
7. FEELING AFRAID AS IF SOMETHING AWFUL MIGHT HAPPEN: NEARLY EVERY DAY
GAD7 TOTAL SCORE: 19

## 2020-12-18 ASSESSMENT — MIFFLIN-ST. JEOR: SCORE: 1513.39

## 2020-12-18 ASSESSMENT — PATIENT HEALTH QUESTIONNAIRE - PHQ9: SUM OF ALL RESPONSES TO PHQ QUESTIONS 1-9: 10

## 2020-12-21 ENCOUNTER — COMMUNICATION - HEALTHEAST (OUTPATIENT)
Dept: ADMINISTRATIVE | Facility: HOSPITAL | Age: 25
End: 2020-12-21

## 2020-12-29 ENCOUNTER — COMMUNICATION - HEALTHEAST (OUTPATIENT)
Dept: FAMILY MEDICINE | Facility: CLINIC | Age: 25
End: 2020-12-29

## 2020-12-29 DIAGNOSIS — R06.02 SHORTNESS OF BREATH: ICD-10-CM

## 2021-01-05 ENCOUNTER — HOSPITAL ENCOUNTER (OUTPATIENT)
Dept: CARDIOLOGY | Facility: CLINIC | Age: 26
Discharge: HOME | End: 2021-01-05
Attending: FAMILY MEDICINE

## 2021-01-05 DIAGNOSIS — R00.2 PALPITATIONS: ICD-10-CM

## 2021-01-06 ENCOUNTER — OFFICE VISIT - HEALTHEAST (OUTPATIENT)
Dept: FAMILY MEDICINE | Facility: CLINIC | Age: 26
End: 2021-01-06

## 2021-01-06 DIAGNOSIS — J02.9 SORE THROAT: ICD-10-CM

## 2021-01-06 DIAGNOSIS — R53.83 FATIGUE, UNSPECIFIED TYPE: ICD-10-CM

## 2021-01-06 DIAGNOSIS — F41.1 GAD (GENERALIZED ANXIETY DISORDER): ICD-10-CM

## 2021-01-06 DIAGNOSIS — R06.02 SHORTNESS OF BREATH: ICD-10-CM

## 2021-01-14 ENCOUNTER — OFFICE VISIT - HEALTHEAST (OUTPATIENT)
Dept: ONCOLOGY | Facility: CLINIC | Age: 26
End: 2021-01-14

## 2021-01-14 DIAGNOSIS — Z90.81 POST-SPLENECTOMY: ICD-10-CM

## 2021-01-14 DIAGNOSIS — D75.839 THROMBOCYTHEMIA: ICD-10-CM

## 2021-01-14 LAB
ALBUMIN SERPL-MCNC: 4.4 G/DL (ref 3.5–5)
ALP SERPL-CCNC: 47 U/L (ref 45–120)
ALT SERPL W P-5'-P-CCNC: 12 U/L (ref 0–45)
ANION GAP SERPL CALCULATED.3IONS-SCNC: 8 MMOL/L (ref 5–18)
AST SERPL W P-5'-P-CCNC: 14 U/L (ref 0–40)
BILIRUB SERPL-MCNC: 0.7 MG/DL (ref 0–1)
BUN SERPL-MCNC: 14 MG/DL (ref 8–22)
CALCIUM SERPL-MCNC: 8.7 MG/DL (ref 8.5–10.5)
CHLORIDE BLD-SCNC: 103 MMOL/L (ref 98–107)
CO2 SERPL-SCNC: 26 MMOL/L (ref 22–31)
CREAT SERPL-MCNC: 0.62 MG/DL (ref 0.6–1.1)
ERYTHROCYTE [DISTWIDTH] IN BLOOD BY AUTOMATED COUNT: 12.7 % (ref 11–14.5)
GFR SERPL CREATININE-BSD FRML MDRD: >60 ML/MIN/1.73M2
GLUCOSE BLD-MCNC: 84 MG/DL (ref 70–125)
HCT VFR BLD AUTO: 39.4 % (ref 35–47)
HGB BLD-MCNC: 14.3 G/DL (ref 12–16)
LDH SERPL L TO P-CCNC: 167 U/L (ref 125–220)
MCH RBC QN AUTO: 31.3 PG (ref 27–34)
MCHC RBC AUTO-ENTMCNC: 36.3 G/DL (ref 32–36)
MCV RBC AUTO: 86 FL (ref 80–100)
PLATELET # BLD AUTO: 600 THOU/UL (ref 140–440)
PMV BLD AUTO: 8.6 FL (ref 8.5–12.5)
POTASSIUM BLD-SCNC: 4.1 MMOL/L (ref 3.5–5)
PROT SERPL-MCNC: 8.1 G/DL (ref 6–8)
RBC # BLD AUTO: 4.57 MILL/UL (ref 3.8–5.4)
SODIUM SERPL-SCNC: 137 MMOL/L (ref 136–145)
WBC: 7.9 THOU/UL (ref 4–11)

## 2021-01-14 ASSESSMENT — MIFFLIN-ST. JEOR: SCORE: 1471.2

## 2021-01-15 ENCOUNTER — COMMUNICATION - HEALTHEAST (OUTPATIENT)
Dept: ONCOLOGY | Facility: CLINIC | Age: 26
End: 2021-01-15

## 2021-01-20 LAB
ARUP MISCELLANEOUS TEST: NORMAL
MISCELLANEOUS TEST DEPT. - HE HISTORICAL: NORMAL
PERFORMING LAB: NORMAL
SPECIMEN STATUS: NORMAL
TEST NAME: NORMAL

## 2021-01-21 ENCOUNTER — COMMUNICATION - HEALTHEAST (OUTPATIENT)
Dept: ONCOLOGY | Facility: CLINIC | Age: 26
End: 2021-01-21

## 2021-01-23 ENCOUNTER — COMMUNICATION - HEALTHEAST (OUTPATIENT)
Dept: FAMILY MEDICINE | Facility: CLINIC | Age: 26
End: 2021-01-23

## 2021-01-23 DIAGNOSIS — F41.1 GAD (GENERALIZED ANXIETY DISORDER): ICD-10-CM

## 2021-01-26 LAB
MISCELLANEOUS TEST DEPT. - HE HISTORICAL: NORMAL
PERFORMING LAB: NORMAL
SPECIMEN STATUS: NORMAL
TEST NAME: NORMAL

## 2021-02-02 ENCOUNTER — COMMUNICATION - HEALTHEAST (OUTPATIENT)
Dept: ONCOLOGY | Facility: CLINIC | Age: 26
End: 2021-02-02

## 2021-02-02 ENCOUNTER — OFFICE VISIT - HEALTHEAST (OUTPATIENT)
Dept: ONCOLOGY | Facility: CLINIC | Age: 26
End: 2021-02-02

## 2021-02-02 DIAGNOSIS — D75.839 THROMBOCYTOSIS: ICD-10-CM

## 2021-02-02 DIAGNOSIS — Z90.81 POST-SPLENECTOMY: ICD-10-CM

## 2021-02-17 ENCOUNTER — OFFICE VISIT - HEALTHEAST (OUTPATIENT)
Dept: FAMILY MEDICINE | Facility: CLINIC | Age: 26
End: 2021-02-17

## 2021-02-17 DIAGNOSIS — F32.1 MODERATE MAJOR DEPRESSION (H): ICD-10-CM

## 2021-02-17 DIAGNOSIS — H69.92 DYSFUNCTION OF LEFT EUSTACHIAN TUBE: ICD-10-CM

## 2021-02-17 DIAGNOSIS — F41.1 GAD (GENERALIZED ANXIETY DISORDER): ICD-10-CM

## 2021-02-17 ASSESSMENT — ANXIETY QUESTIONNAIRES
5. BEING SO RESTLESS THAT IT IS HARD TO SIT STILL: MORE THAN HALF THE DAYS
GAD7 TOTAL SCORE: 13
7. FEELING AFRAID AS IF SOMETHING AWFUL MIGHT HAPPEN: SEVERAL DAYS
1. FEELING NERVOUS, ANXIOUS, OR ON EDGE: MORE THAN HALF THE DAYS
4. TROUBLE RELAXING: MORE THAN HALF THE DAYS
3. WORRYING TOO MUCH ABOUT DIFFERENT THINGS: MORE THAN HALF THE DAYS
2. NOT BEING ABLE TO STOP OR CONTROL WORRYING: MORE THAN HALF THE DAYS
IF YOU CHECKED OFF ANY PROBLEMS ON THIS QUESTIONNAIRE, HOW DIFFICULT HAVE THESE PROBLEMS MADE IT FOR YOU TO DO YOUR WORK, TAKE CARE OF THINGS AT HOME, OR GET ALONG WITH OTHER PEOPLE: VERY DIFFICULT
6. BECOMING EASILY ANNOYED OR IRRITABLE: MORE THAN HALF THE DAYS

## 2021-02-17 ASSESSMENT — PATIENT HEALTH QUESTIONNAIRE - PHQ9: SUM OF ALL RESPONSES TO PHQ QUESTIONS 1-9: 11

## 2021-02-26 ENCOUNTER — COMMUNICATION - HEALTHEAST (OUTPATIENT)
Dept: FAMILY MEDICINE | Facility: CLINIC | Age: 26
End: 2021-02-26

## 2021-02-26 DIAGNOSIS — H69.92 DYSFUNCTION OF LEFT EUSTACHIAN TUBE: ICD-10-CM

## 2021-03-16 ENCOUNTER — COMMUNICATION - HEALTHEAST (OUTPATIENT)
Dept: FAMILY MEDICINE | Facility: CLINIC | Age: 26
End: 2021-03-16

## 2021-03-16 DIAGNOSIS — F41.1 GAD (GENERALIZED ANXIETY DISORDER): ICD-10-CM

## 2021-03-22 ENCOUNTER — COMMUNICATION - HEALTHEAST (OUTPATIENT)
Dept: FAMILY MEDICINE | Facility: CLINIC | Age: 26
End: 2021-03-22

## 2021-04-13 ENCOUNTER — COMMUNICATION - HEALTHEAST (OUTPATIENT)
Dept: FAMILY MEDICINE | Facility: CLINIC | Age: 26
End: 2021-04-13

## 2021-05-26 ASSESSMENT — PATIENT HEALTH QUESTIONNAIRE - PHQ9: SUM OF ALL RESPONSES TO PHQ QUESTIONS 1-9: 22

## 2021-05-27 ASSESSMENT — PATIENT HEALTH QUESTIONNAIRE - PHQ9
SUM OF ALL RESPONSES TO PHQ QUESTIONS 1-9: 11
SUM OF ALL RESPONSES TO PHQ QUESTIONS 1-9: 10

## 2021-05-28 ENCOUNTER — COMMUNICATION - HEALTHEAST (OUTPATIENT)
Dept: FAMILY MEDICINE | Facility: CLINIC | Age: 26
End: 2021-05-28

## 2021-05-28 DIAGNOSIS — F41.1 GAD (GENERALIZED ANXIETY DISORDER): ICD-10-CM

## 2021-05-28 ASSESSMENT — ANXIETY QUESTIONNAIRES
GAD7 TOTAL SCORE: 19
GAD7 TOTAL SCORE: 17
GAD7 TOTAL SCORE: 13

## 2021-05-29 ENCOUNTER — COMMUNICATION - HEALTHEAST (OUTPATIENT)
Dept: FAMILY MEDICINE | Facility: CLINIC | Age: 26
End: 2021-05-29

## 2021-05-29 DIAGNOSIS — F41.1 GAD (GENERALIZED ANXIETY DISORDER): ICD-10-CM

## 2021-05-29 DIAGNOSIS — R00.2 PALPITATIONS: ICD-10-CM

## 2021-05-29 DIAGNOSIS — R07.89 ATYPICAL CHEST PAIN: ICD-10-CM

## 2021-05-29 NOTE — PROGRESS NOTES
"Chief Complaint   Patient presents with     Dental Pain     Has a broken tooth and all 4 wisdon teeth are coming in and causing a lot of pain. Can't sleep, eat or function currently. Would like something for the pain until June 2 which is the dental appointment.      Hospital Visit Follow Up     Was seen in hospital yesterday. They rxed clindamycin. Mom would like to verify that this is a good antibiotic but also needs something for the pain.      HPI: Patient presents today following up her visit to the emergency department on 5/22/2019 for which she was seen for oral pain.  Review of notes shows that she had broken her right back molar a few months ago and has been having consistent pain with this and is hoping to get it fixed soon.  ER physician noted of right lower posterior molar fracture with a fracture of the crown and gingival swelling and tenderness.  Patient says that she also notes wisdom teeth growing as well and was told that they were \"growing into the nerves\".  No dental records for review, but she apparently has a follow-up with an oral surgeon on 5/29 and has plans extractions of these teeth on 6/2/2019.  She is wondering if she can get anything for pain while she awaits her visit with the oral surgeon.  She has been taking ibuprofen 400 mg every 4 hours as needed along with acetaminophen and Orajel.  None of these seem to provide any meaningful relief.  She says that she has used oxycodone in the past but developed significant nausea and vomiting from it.    She was also prescribed clindamycin in the emergency department.  Tolerating it well.  She wonders if this is the correct antibiotic to be on leading up to her surgery.    ROS:Review of Systems - History obtained from the patient  General ROS: negative  ENT ROS: positive for - oral pain  Hematological and Lymphatic ROS: negative  Respiratory ROS: negative  Cardiovascular ROS: negative  Musculoskeletal ROS: negative  Neurological ROS: " "negative    SH: The Patient's  reports that she quit smoking about 2 years ago. She has a 1.50 pack-year smoking history. She has never used smokeless tobacco. She reports that she drinks alcohol. She reports that she does not use drugs.      FH: The Patient's family history includes Clotting disorder in her brother.     Meds:    Current Outpatient Medications on File Prior to Visit   Medication Sig Dispense Refill     acetaminophen (TYLENOL) 325 MG tablet Take 1-2 tablets (325-650 mg total) by mouth every 4 (four) hours as needed.  0     clindamycin (CLEOCIN) 150 MG capsule Take 1 capsule (150 mg total) by mouth 4 (four) times a day for 7 days. 28 capsule 0     escitalopram oxalate (LEXAPRO) 20 MG tablet Take 1 tablet (20 mg total) by mouth daily. 90 tablet 1     ibuprofen (ADVIL,MOTRIN) 800 MG tablet Take 1 tablet (800 mg total) by mouth every 8 (eight) hours. 90 tablet 0     LORazepam (ATIVAN) 1 MG tablet Take 1 tablet (1 mg total) by mouth 2 (two) times a day as needed for anxiety. 20 tablet 1     No current facility-administered medications on file prior to visit.        O:  /82   Pulse 79   Temp 98.8  F (37.1  C) (Oral)   Ht 5' 3\" (1.6 m)   Wt 142 lb (64.4 kg)   LMP 05/01/2019   SpO2 97%   BMI 25.15 kg/m      Physical Examination:   General appearance - alert, well appearing, and in no distress  Mental status - alert, oriented to person, place, and time  Eyes - pupils equal and reactive, extraocular eye movements intact  Ears - bilateral TM's and external ear canals normal  Nose - normal and patent, no erythema, discharge or polyps  Mouth -swelling along both her cheeks.  No erythema.  Mucous membranes moist.  Airway patent.  No tonsillar hypertrophy.  Crack in her right back molar.  Evidence of rupture of wisdom teeth noted.  No abscess or purulent drainage.  Neck - supple, no significant adenopathy  Lymphatics - no palpable lymphadenopathy, no hepatosplenomegaly  Chest - clear to auscultation, " no wheezes, rales or rhonchi, symmetric air entry  Heart - normal rate and regular rhythm, S1 and S2 normal, no murmurs noted    A/P:     Problem List Items Addressed This Visit     None      Visit Diagnoses     Pain, dental    -  Primary    Relevant Medications    HYDROcodone-acetaminophen 5-325 mg per tablet            1. Pain, dental  Encouraged to finish clindamycin.  Keep scheduled appointment with oral surgeon.  Informed her that postoperatively her pain will need to be managed by oral surgery.       reviewed.  Trial of hydrocodone sent to the pharmacy to use as needed.  Advised about potential side effects such as lightheadedness, dizziness, dependency, and constipation.  No alcohol or driving after using this.    - HYDROcodone-acetaminophen 5-325 mg per tablet; Take 1 tablet by mouth every 4 (four) hours as needed for pain.  Dispense: 60 tablet; Refill: 0        Petros Winchester, CNP

## 2021-05-29 NOTE — TELEPHONE ENCOUNTER
Prior Authorization Request  Who s requesting: [id number]  Pharmacy Name and Location: [facility]  Medication Name: Tramadol  Insurance Plan: ERIK RICARDO  Insurance Member ID Number:  [id number]  Informed patient that prior authorizations can take up to 10 business days for response:   No  Okay to leave a detailed message: No

## 2021-05-29 NOTE — TELEPHONE ENCOUNTER
What other medication can pt try for tooth pain? Does she need to do anything with the already rxed vicodin?    Melvi Castaneda, CMA

## 2021-05-29 NOTE — TELEPHONE ENCOUNTER
Can try tramadol instead. Rx sent. If that doesn't cut it, unfortunately I will be out of options as I do not prescribe anything stronger in primary care. It looks like this rx will need a PA as well, so please make sure that gets done. She can bring the norco to a drug disposal drop off bin.    Petros Winchester, CNP

## 2021-05-29 NOTE — PROGRESS NOTES
ASSESSMENT/PLAN:       1. Pain, dental  -The ibuprofen and Tylenol are working but she does not want to cause any other problems with the medications while she is taking them.  We discussed trialing a course of naproxen instead of the ibuprofen as this is only twice daily, and she is agreeable to trying this.  In the meantime she will continue on the Tylenol and she will let me know if this is not working and we can consider further options.  - naproxen (NAPROSYN) 500 MG tablet; Take 1 tablet (500 mg total) by mouth 2 (two) times a day with meals.  Dispense: 60 tablet; Refill: 2    2. ZION (generalized anxiety disorder)  -She continues to remain quite anxious, and we discussed options including changing her medication from Lexapro to something like venlafaxine or considering trialing an addition of buspirone.  As she feels generally pretty terrible right now I felt more comfortable encouraging her to add an additional medication which she is agreeable to.  She will start on a low-dose of buspirone, and plan on following up in 1 month for recheck if she is doing well, she will call or message sooner if things are not improving.  - busPIRone (BUSPAR) 5 MG tablet; Take 1 tablet (5 mg total) by mouth 2 (two) times a day.  Dispense: 60 tablet; Refill: 1    3. Panic disorder  -Adding buspirone as listed above, continue on Lexapro  - escitalopram oxalate (LEXAPRO) 20 MG tablet; Take 1 tablet (20 mg total) by mouth daily.  Dispense: 90 tablet; Refill: 1    4. Moderate major depression (H)  -Stable, continue to monitor    5. Hereditary spherocytosis (H)  -Stable, continue to monitor for infection given her impacted wisdom teeth.      Return for Next scheduled follow up.      Michelle Medina MD      PROGRESS NOTE   6/11/2019    SUBJECTIVE:  Tonya Thayer is a 23 y.o. female  who presents for follow up.     She does have wisdom tooth problems. She does have an appointment for removal at the end of July, on  7/25. Did get a prescription for tramadol and vicodin, both caused nausea and vomiting, sweating and other side effects.     She is taking tylenol and ibuprofen but this makes her worried about long term implications.     She has been having bad panic attacks lately. She was at [employer], was clammy, couldn't see, was dizzy and couldn't think straight. She has been having this more often, and she can't tell me why. She does have a tough living situation right now. She is on a full dose of Lexapro. The ativan isn't helping with the panic attacks either. She does not like feeling like this.   Chief Complaint   Patient presents with     Anxiety     Patient would like to discuss her anxiety, having more panic attacks. Patient does have a appointment with [facility] in two weeks.      Dental Pain     Patient would like to follow up on her dental pain, hydrocodone-aceteaminophen prescribed on 5/24/19 after office visit here at the clinic. Patient does not take the prescribed pain medications because it causes nausea and vomiting. Patient does not get her wisdom teeth removed until the end of July.          Patient Active Problem List   Diagnosis     Hereditary Hemolytic Spherocytosis     Allergic Rhinitis     Asplenia     Elevated liver enzymes     Panic disorder     Thrombocytosis (H)     ZION (generalized anxiety disorder)     Nonintractable migraine     Moderate major depression (H)       Current Outpatient Medications   Medication Sig Dispense Refill     acetaminophen (TYLENOL) 325 MG tablet Take 1-2 tablets (325-650 mg total) by mouth every 4 (four) hours as needed.  0     escitalopram oxalate (LEXAPRO) 20 MG tablet Take 1 tablet (20 mg total) by mouth daily. 90 tablet 1     ibuprofen (ADVIL,MOTRIN) 800 MG tablet Take 1 tablet (800 mg total) by mouth every 8 (eight) hours. 90 tablet 0     LORazepam (ATIVAN) 1 MG tablet Take 1 tablet (1 mg total) by mouth 2 (two) times a day as needed for anxiety. 20 tablet 1      busPIRone (BUSPAR) 5 MG tablet Take 1 tablet (5 mg total) by mouth 2 (two) times a day. 60 tablet 1     naproxen (NAPROSYN) 500 MG tablet Take 1 tablet (500 mg total) by mouth 2 (two) times a day with meals. 60 tablet 2     No current facility-administered medications for this visit.        Social History     Tobacco Use   Smoking Status Former Smoker     Packs/day: 0.50     Years: 3.00     Pack years: 1.50     Last attempt to quit: 2017     Years since quittin.1   Smokeless Tobacco Never Used           OBJECTIVE:        No results found for this or any previous visit (from the past 240 hour(s)).    Vitals:    19 1445   BP: 120/70   Patient Site: Right Arm   Patient Position: Sitting   Cuff Size: Adult Regular   Pulse: 80   SpO2: 99%   Weight: 154 lb 12.8 oz (70.2 kg)     Weight: 154 lb 12.8 oz (70.2 kg)          Physical Exam:  GENERAL APPEARANCE: A&A, NAD, well hydrated, well nourished  SKIN:  Normal skin turgor, no lesions/rashes   HEENT: moist mucous membranes, no rhinorrhea, pharynx is unremarkable  NECK: Normal  CV: RRR, no M/G/R   LUNGS: CTAB  Psych: Her affect is stable to slightly anxious, she is casually dressed and groomed, her thought process and speech pattern normal, no obvious hallucinations  NEURO: no gross deficits

## 2021-05-29 NOTE — TELEPHONE ENCOUNTER
Pt seen in clinic 5/24/19 tooth pain, Rx Vicodin given.  Pt states when she takes the Vicodin even with food she gets stomach pain.  Pain lasted about 45 minutes.  Pt is requesting a different pain med as this pain med created great pain, nausea and severe vomiting.  Pt has appt with oral surgeon 6/2/19.  Please advise.  Thank you  Libia Sinha RN, MA  [facility] RN Triage Nurse Advisor

## 2021-05-30 VITALS — BODY MASS INDEX: 23.85 KG/M2 | WEIGHT: 130.4 LBS

## 2021-05-30 VITALS — WEIGHT: 132.8 LBS | BODY MASS INDEX: 23.9 KG/M2

## 2021-05-30 VITALS — BODY MASS INDEX: 23.36 KG/M2 | WEIGHT: 129.8 LBS

## 2021-05-30 NOTE — PROGRESS NOTES
ASSESSMENT/PLAN:       1. Anxiety  -She does find that the medication helps but it is not as effective as it was previously. She is going to follow up with mental health, and plans to see psychiatry for diagnostic clarification as well as treatment.   -She will follow up in 6-8 weeks unless psychiatry has taken over management of her medications.   - LORazepam (ATIVAN) 2 MG tablet; Take 0.5-1 tablets (1-2 mg total) by mouth 2 (two) times a day as needed for anxiety.  Dispense: 30 tablet; Refill: 1      Return in about 6 weeks (around 9/10/2019).      Michelle Median MD      PROGRESS NOTE   7/30/2019    SUBJECTIVE:  Tonya Thayer is a 23 y.o. female  who presents for follow up.     She continues to struggle with anxiety and panic. She did go to her appointment. She did have a panic attack there at the therapist. It has been recommended that she go to a psychiatrist to help with her panic attacks. She did get assigned to someone else and made that appointment. There is a concern for possible bipolar disorder.     She used to get the 2 mg tablets and was getting 60 per month. She did get these from Dr. Stephens. She is taking the lorazepam nearly every day. She does find that she is not getting much out of the current dose.   Chief Complaint   Patient presents with     Anxiety     Patient is here today for a one month follow up.          Patient Active Problem List   Diagnosis     Hereditary Hemolytic Spherocytosis     Allergic Rhinitis     Asplenia     Elevated liver enzymes     Panic disorder     Thrombocytosis (H)     ZION (generalized anxiety disorder)     Nonintractable migraine     Moderate major depression (H)       Current Outpatient Medications   Medication Sig Dispense Refill     acetaminophen (TYLENOL) 325 MG tablet Take 1-2 tablets (325-650 mg total) by mouth every 4 (four) hours as needed.  0     escitalopram oxalate (LEXAPRO) 20 MG tablet Take 1 tablet (20 mg total) by mouth daily. 90  tablet 1     ibuprofen (ADVIL,MOTRIN) 800 MG tablet Take 1 tablet (800 mg total) by mouth every 8 (eight) hours. 90 tablet 0     LORazepam (ATIVAN) 2 MG tablet Take 0.5-1 tablets (1-2 mg total) by mouth 2 (two) times a day as needed for anxiety. 30 tablet 1     naproxen (NAPROSYN) 500 MG tablet Take 1 tablet (500 mg total) by mouth 2 (two) times a day with meals. 60 tablet 2     No current facility-administered medications for this visit.        Social History     Tobacco Use   Smoking Status Former Smoker     Packs/day: 0.50     Years: 3.00     Pack years: 1.50     Last attempt to quit: 2017     Years since quittin.2   Smokeless Tobacco Never Used           OBJECTIVE:        No results found for this or any previous visit (from the past 240 hour(s)).    Vitals:    19 1527   BP: 110/62   Patient Site: Left Arm   Patient Position: Sitting   Cuff Size: Adult Regular   Pulse: 72   SpO2: 98%   Weight: 143 lb 1.6 oz (64.9 kg)     Weight: 143 lb 1.6 oz (64.9 kg)          Physical Exam:  GENERAL APPEARANCE: A&A, NAD, well hydrated, well nourished  SKIN:  Normal skin turgor, no lesions/rashes   HEENT: moist mucous membranes, no rhinorrhea  NECK: Normal  PSYCH: affect is anxious, she is well dressed and well groomed. Normal thought process and speech pattern  EXTREMITY: no edema   NEURO: no gross deficits

## 2021-05-30 NOTE — TELEPHONE ENCOUNTER
Spoke to the patient over the phone, she is aware that PCP left for a delivery and apt needs to be rescheduled. Apt has been rescheduled for both patient and her daughter.

## 2021-05-31 VITALS — BODY MASS INDEX: 23.89 KG/M2 | WEIGHT: 139.2 LBS

## 2021-05-31 VITALS — BODY MASS INDEX: 23.56 KG/M2 | HEIGHT: 64 IN | WEIGHT: 138 LBS

## 2021-05-31 VITALS — BODY MASS INDEX: 22.2 KG/M2 | HEIGHT: 64 IN | WEIGHT: 130 LBS

## 2021-05-31 VITALS — WEIGHT: 151 LBS | BODY MASS INDEX: 25.92 KG/M2

## 2021-05-31 VITALS — HEIGHT: 64 IN | WEIGHT: 129 LBS | BODY MASS INDEX: 22.02 KG/M2

## 2021-05-31 VITALS — HEIGHT: 64 IN | BODY MASS INDEX: 22.88 KG/M2 | WEIGHT: 134 LBS

## 2021-05-31 VITALS — BODY MASS INDEX: 27.55 KG/M2 | WEIGHT: 155.5 LBS

## 2021-05-31 VITALS — HEIGHT: 63 IN | BODY MASS INDEX: 23.62 KG/M2 | WEIGHT: 133.3 LBS

## 2021-05-31 VITALS — BODY MASS INDEX: 24.79 KG/M2 | WEIGHT: 144.4 LBS

## 2021-05-31 VITALS — WEIGHT: 144 LBS | BODY MASS INDEX: 24.72 KG/M2

## 2021-05-31 VITALS — WEIGHT: 130.4 LBS | BODY MASS INDEX: 23.1 KG/M2

## 2021-05-31 VITALS — BODY MASS INDEX: 26.8 KG/M2 | WEIGHT: 151.3 LBS

## 2021-05-31 VITALS — HEIGHT: 64 IN | BODY MASS INDEX: 22.42 KG/M2 | WEIGHT: 131.3 LBS

## 2021-06-01 VITALS — BODY MASS INDEX: 31 KG/M2 | WEIGHT: 175 LBS

## 2021-06-01 VITALS — WEIGHT: 152.4 LBS | BODY MASS INDEX: 27 KG/M2

## 2021-06-01 VITALS — BODY MASS INDEX: 32.01 KG/M2 | WEIGHT: 180.7 LBS

## 2021-06-01 VITALS — WEIGHT: 151.3 LBS | BODY MASS INDEX: 26.8 KG/M2

## 2021-06-01 VITALS — HEIGHT: 63 IN | WEIGHT: 166 LBS | BODY MASS INDEX: 29.41 KG/M2

## 2021-06-01 VITALS — WEIGHT: 166.8 LBS | BODY MASS INDEX: 29.55 KG/M2

## 2021-06-01 VITALS — BODY MASS INDEX: 28.29 KG/M2 | WEIGHT: 159.7 LBS

## 2021-06-01 VITALS — BODY MASS INDEX: 27.53 KG/M2 | WEIGHT: 155.38 LBS | HEIGHT: 63 IN

## 2021-06-01 VITALS — WEIGHT: 155.6 LBS | BODY MASS INDEX: 27.56 KG/M2

## 2021-06-01 VITALS — WEIGHT: 153.4 LBS | BODY MASS INDEX: 27.17 KG/M2

## 2021-06-01 VITALS — BODY MASS INDEX: 29.97 KG/M2 | WEIGHT: 169.2 LBS

## 2021-06-01 VITALS — WEIGHT: 169.5 LBS | BODY MASS INDEX: 30.03 KG/M2

## 2021-06-01 VITALS — BODY MASS INDEX: 29.9 KG/M2 | WEIGHT: 168.8 LBS

## 2021-06-01 VITALS — WEIGHT: 165.8 LBS | BODY MASS INDEX: 29.37 KG/M2

## 2021-06-02 VITALS — WEIGHT: 184.6 LBS | BODY MASS INDEX: 32.7 KG/M2

## 2021-06-02 VITALS — HEIGHT: 63 IN | BODY MASS INDEX: 27.46 KG/M2 | WEIGHT: 155 LBS

## 2021-06-02 VITALS — WEIGHT: 182.8 LBS | BODY MASS INDEX: 32.38 KG/M2

## 2021-06-02 VITALS — WEIGHT: 185.5 LBS | BODY MASS INDEX: 32.86 KG/M2

## 2021-06-02 VITALS — WEIGHT: 150 LBS | BODY MASS INDEX: 26.57 KG/M2

## 2021-06-02 VITALS — BODY MASS INDEX: 32.7 KG/M2 | WEIGHT: 184.6 LBS

## 2021-06-02 VITALS — BODY MASS INDEX: 31.67 KG/M2 | WEIGHT: 178.8 LBS

## 2021-06-02 VITALS — BODY MASS INDEX: 26.62 KG/M2 | WEIGHT: 150.3 LBS

## 2021-06-02 NOTE — TELEPHONE ENCOUNTER
New Appointment Needed  What is the reason for the visit:  INF - [facility] - 10/12 thru 10/14 - SIRS - f/u within 7 days    Inpatient/ED Follow Up Appt Request  At what hospital or facility were you seen?: [facility]  What is the reason you were seen?: SIRS  What date were you admitted?: date: 10/12  What date were you discharged?: date: 10/14  What was the recommended timeframe for your follow up appointment?: within 7 days  Provider Preference: PCP only  How soon do you need to be seen?: within 7 days  Waitlist offered?: No  Okay to leave a detailed message:  Yes      Please call the patient to assist in scheduling this appointment. Thank you.

## 2021-06-02 NOTE — PATIENT INSTRUCTIONS - HE
Re-check of liver enzymes and blood counts to make sure things are stable.    You should get a call about getting set up on that heart monitor which you'll wear for a week and will tell us what your heart is doing when you're getting these palpitations.    I usually comment on labs and imaging after they are all resulted within 2 business days. If you haven't heard your results within a week, please contact the office.    Thank you for coming in today!    If you receive a survey from [facility] about your experience today, it would be very helpful if you could fill it out to let us know what went well and what we can improve!    General Information:    Today you had your appointment with Petros Winchester NP    My hours are:    Monday : Out of clinic  Tuesday : 8:00AM - 5:00 PM  Wednesday: 8:00AM - 5:00 PM  Thursday: 8:00AM - 5:00 PM  Friday: 8:00AM - 5:00 PM    I am not in the office Mondays. Therefore non-urgent calls and medical messages received on Monday will be addressed when I am back in the office on Tuesday. Urgent matters will be reviewed and addressed by one of my partners in the office as needed.    If lab work was done today as part of your evaluation you will generally be contacted via [facility], mail, or phone with the results within 1-5 days. If there is an alarming result we will contact you by phone. Lab results come back at varying times, I generally wait until all lab results are available before making comments on the results.     If you need refills please contact your pharmacy. They will send a refill request to me to review. Please allow 3-5 business days for us to process all refill requests.     My Clinical Assistant is Melvi. Please call us at [phone] or send a medical message with any questions or concerns.

## 2021-06-02 NOTE — TELEPHONE ENCOUNTER
I can do 8 40 next Tuesday, unfortunately, I'm really overbooked this week due to rounding in the hospital in the mornings.

## 2021-06-02 NOTE — PROGRESS NOTES
"TCM DISCHARGE FOLLOW UP CALL    Discharge Date:  10/14/2019  Reason for hospital stay (discharge diagnosis)::  SIRS, N/V, fever  Are you feeling better, the same or worse since your discharge?:  Patient is feeling the same (She is having heart pounding like she had in the hospital in the middle of the night. She is more anxious because of it. She has increased Ativan to 2-3x/day because the tachycardia \"freaks me out\". denies fever during the night. +body aches)  Do you feel like you have a plan in the event of a health emergency?: Yes (Mom)    As part of your discharge plan, were  home care services ordered for you?: No    Did you receive any new medications, or was there a change to your medications?: No    Do you have any follow up visits scheduled with your PCP or Specialist?:  Yes, with PCP  (RN) Is PCP appt scheduled soon enough (within 14 days of discharge date)?: Yes (10/16)    RN NOTES::  Pt might benefit from an allergy consult.    "

## 2021-06-02 NOTE — PROGRESS NOTES
Chief Complaint   Patient presents with     Hospital Visit Follow Up     at  from 10/12/19 - 10/14/19 for feeling sick, elevated hear rate and fever. Still not feeling better.      Tachycardia     She feels her heart racing. Waking her up at night.        HPI: Patient presents today following her hospital admission from 10/12/2019-10/14/2019.  Principal diagnosis was leukocytosis with a fever and associated nausea, vomiting, and diarrhea along with SIRS that was most likely of viral origin.  Because of her history of splenectomy and there was increased risk and concern for encapsulated bacterial infection.  Procalcitonin and strep ended up being negative.  Her white count cherelle as high as 21,000, but started to decrease spontaneously without antibiotic intervention.  Infectious disease was consulted and they agreed that this is most likely a viral process and recommended no antibiotics.    While in the emergency department, the patient did have a dose of levofloxacin and started to develop significant flushing along with shortness of breath which did self resolve.  She also received a dose of ceftriaxone prior to infectious disease consulting.  By the time the patient discharge she was feeling considerably better.    Of note, patient had mildly elevated ALT.  No right upper quadrant pain.  No jaundice.  No heavy EtOH.  Needs recheck.    Since discharge, the patient has been doing okay.  She still feels increased fatigue with exertion.  Her main concern now is that she is having episodes where she feels like her heart is beating very fast.  She says that sometimes she will wake up in the middle of the night and feel like her heart is beating quickly.  She has not checked her pulse during these times.  No chest pain, nausea, radiation into the jaw, lightheadedness, dizziness when these events occur.  She continues with her Lexapro 20 mg and Ativan as needed.  Last testing of her TSH was almost 2 years ago.  ECG  "testing in the emergency department showed:    Most Recent EKG     Units 10/12/19  1446   VENTRATE    ATRIALRATE    QRSDURATION ms 78   QTINTERVAL ms 316   QTCCALC ms 444   P Axis degrees 113   RAXIS degrees 134   TAXIS degrees 166   MUSEDX  ** Suspect arm lead reversal, interpretation assumes no reversal  Sinus tachycardia  Right axis deviation  Nonspecific T wave abnormality  Abnormal ECG  When compared with ECG of 11-OCT-2019 18:14,  QRS axis Shifted right  Suggest repeating ECG with correct arm lead placement  Confirmed by EL ALLEN MD LOC:WW [id number]) on 10/12/2019 4:59:38 PM           ROS:Review of Systems - negative except for what's listed in the HPI    SH: The Patient's  reports that she quit smoking about 2 years ago. She has a 1.50 pack-year smoking history. She has never used smokeless tobacco. She reports current alcohol use. She reports that she does not use drugs.      FH: The Patient's family history includes Clotting disorder in her brother.     Meds:    Current Outpatient Medications on File Prior to Visit   Medication Sig Dispense Refill     escitalopram oxalate (LEXAPRO) 20 MG tablet Take 1 tablet (20 mg total) by mouth daily. 90 tablet 1     LORazepam (ATIVAN) 2 MG tablet Take 0.5-1 tablets (1-2 mg total) by mouth 2 (two) times a day as needed for anxiety. 30 tablet 1     acetaminophen (TYLENOL) 325 MG tablet Take 1-2 tablets (325-650 mg total) by mouth every 4 (four) hours as needed.  0     ibuprofen (ADVIL,MOTRIN) 200 MG tablet Take 400 mg by mouth every 6 (six) hours as needed for pain.       ondansetron (ZOFRAN) 4 MG tablet Take 1 tablet (4 mg total) by mouth every 8 (eight) hours as needed for nausea. 30 tablet 1     No current facility-administered medications on file prior to visit.        O:  /76   Pulse 95   Temp 98.5  F (36.9  C) (Oral)   Ht 5' 3\" (1.6 m)   Wt 146 lb (66.2 kg)   LMP 10/13/2019   SpO2 97%   BMI 25.86 kg/m      Physical Examination: "   General appearance - alert, well appearing, and in no distress  Mental status - alert, oriented to person, place, and time  Eyes - pupils equal and reactive, extraocular eye movements intact  Mouth - mucous membranes moist, pharynx normal without lesions  Neck - supple, no significant adenopathy  Lymphatics - no palpable lymphadenopathy, no hepatosplenomegaly  Chest - clear to auscultation, no wheezes, rales or rhonchi, symmetric air entry  Heart - normal rate and regular rhythm, S1 and S2 normal, no murmurs noted  Abdomen - soft, nontender, nondistended, no masses or organomegaly  Neurological - alert, oriented, normal speech, no focal findings or movement disorder noted, neck supple without rigidity, motor and sensory grossly normal bilaterally, normal muscle tone, no tremors  Extremities - peripheral pulses normal, no pedal edema, no clubbing or cyanosis  Skin - normal coloration and turgor, no rashes, no suspicious skin lesions noted      A/P:     Problem List Items Addressed This Visit     Hereditary Hemolytic Spherocytosis    Elevated liver enzymes    Relevant Orders    Comprehensive Metabolic Panel    SIRS (systemic inflammatory response syndrome) (H)    Relevant Orders    HM2(CBC w/o Differential) (Completed)    Comprehensive Metabolic Panel      Other Visit Diagnoses     Palpitations    -  Primary    Relevant Orders    JES Monitor Hook-Up    Thyroid Stimulating Hormone (TSH)            1. Palpitations  Normal vital signs today.  Cardiac exam is normal.  Rule out hyperthyroidism.  If persisting, continuous monitor for a week.  If associated chest pain, emergency department.  - JES Monitor Hook-Up; Future  - Thyroid Stimulating Hormone (TSH)    2. SIRS (systemic inflammatory response syndrome) (H)  Recheck labs.  Ensure stable white count and improvement in ALT.  - HM2(CBC w/o Differential)  - Comprehensive Metabolic Panel    3. Hereditary Hemolytic Spherocytosis  Status post splenomegaly.  Recheck labs  today.    4. Elevated liver enzymes  Recheck LFT's  - Comprehensive Metabolic Panel    Petros Winchester, CNP

## 2021-06-03 VITALS
HEART RATE: 95 BPM | OXYGEN SATURATION: 97 % | TEMPERATURE: 98.5 F | WEIGHT: 146 LBS | DIASTOLIC BLOOD PRESSURE: 76 MMHG | HEIGHT: 63 IN | SYSTOLIC BLOOD PRESSURE: 122 MMHG | BODY MASS INDEX: 25.87 KG/M2

## 2021-06-03 VITALS — BODY MASS INDEX: 27.42 KG/M2 | WEIGHT: 154.8 LBS

## 2021-06-03 VITALS — BODY MASS INDEX: 25.16 KG/M2 | WEIGHT: 142 LBS | HEIGHT: 63 IN

## 2021-06-03 VITALS — BODY MASS INDEX: 25.35 KG/M2 | WEIGHT: 143.1 LBS

## 2021-06-03 NOTE — TELEPHONE ENCOUNTER
Controlled Substance Refill Request  Medication:   Requested Prescriptions     Pending Prescriptions Disp Refills     LORazepam (ATIVAN) 1 MG tablet [Pharmacy Med Name: LORAZEPAM 1MG] 60 tablet 1     Sig: TAKE 1-2 TABLETS BY MOUTH TWICE DAILY AS NEEDED FOR ANXIETY     Date Last Fill: 10/15/19  Pharmacy:[id number]    Submit electronically to pharmacy  Controlled Substance Agreement on File:   Encounter-Level CSA Scan Date - 01/24/2018:    Scan on 1/26/2018 10:23 AM: HE       Last office visit: Last office visit pertaining to requested medication was 10/16/19.

## 2021-06-04 VITALS — BODY MASS INDEX: 28.7 KG/M2 | WEIGHT: 162 LBS

## 2021-06-04 VITALS
SYSTOLIC BLOOD PRESSURE: 114 MMHG | TEMPERATURE: 98.6 F | WEIGHT: 155 LBS | HEIGHT: 63 IN | DIASTOLIC BLOOD PRESSURE: 80 MMHG | HEART RATE: 84 BPM | BODY MASS INDEX: 27.46 KG/M2

## 2021-06-04 VITALS
DIASTOLIC BLOOD PRESSURE: 60 MMHG | OXYGEN SATURATION: 98 % | HEART RATE: 97 BPM | WEIGHT: 159 LBS | BODY MASS INDEX: 28.17 KG/M2 | SYSTOLIC BLOOD PRESSURE: 104 MMHG

## 2021-06-04 VITALS
HEART RATE: 124 BPM | BODY MASS INDEX: 27.62 KG/M2 | WEIGHT: 155.9 LBS | SYSTOLIC BLOOD PRESSURE: 118 MMHG | OXYGEN SATURATION: 98 % | DIASTOLIC BLOOD PRESSURE: 70 MMHG

## 2021-06-05 VITALS
BODY MASS INDEX: 28.82 KG/M2 | SYSTOLIC BLOOD PRESSURE: 126 MMHG | OXYGEN SATURATION: 99 % | WEIGHT: 167.9 LBS | HEART RATE: 98 BPM | DIASTOLIC BLOOD PRESSURE: 66 MMHG

## 2021-06-05 VITALS
BODY MASS INDEX: 28.76 KG/M2 | RESPIRATION RATE: 16 BRPM | DIASTOLIC BLOOD PRESSURE: 60 MMHG | HEART RATE: 86 BPM | HEIGHT: 63 IN | OXYGEN SATURATION: 97 % | WEIGHT: 162.31 LBS | TEMPERATURE: 97.9 F | SYSTOLIC BLOOD PRESSURE: 108 MMHG

## 2021-06-05 VITALS
BODY MASS INDEX: 27.52 KG/M2 | OXYGEN SATURATION: 98 % | WEIGHT: 161.2 LBS | HEIGHT: 64 IN | DIASTOLIC BLOOD PRESSURE: 71 MMHG | HEART RATE: 85 BPM | TEMPERATURE: 98.5 F | SYSTOLIC BLOOD PRESSURE: 127 MMHG

## 2021-06-05 VITALS
OXYGEN SATURATION: 98 % | SYSTOLIC BLOOD PRESSURE: 112 MMHG | WEIGHT: 166 LBS | TEMPERATURE: 98.3 F | RESPIRATION RATE: 22 BRPM | HEART RATE: 89 BPM | BODY MASS INDEX: 27.62 KG/M2 | DIASTOLIC BLOOD PRESSURE: 72 MMHG

## 2021-06-05 VITALS
DIASTOLIC BLOOD PRESSURE: 70 MMHG | HEIGHT: 65 IN | SYSTOLIC BLOOD PRESSURE: 108 MMHG | WEIGHT: 167 LBS | BODY MASS INDEX: 27.82 KG/M2 | OXYGEN SATURATION: 98 % | RESPIRATION RATE: 16 BRPM | HEART RATE: 79 BPM

## 2021-06-05 NOTE — TELEPHONE ENCOUNTER
No PA needed, this was a pharmacy error.  Called [facility] and per pharmacist they already have a paid claim and patient picked up.      No further action needed.

## 2021-06-05 NOTE — PROGRESS NOTES
ASSESSMENT/PLAN:       1. ZION (generalized anxiety disorder)  -Renewing lorazepam for as needed usage at this time.   -As she was on venlafaxine with good control of her symptoms previously which was stopped due to pregnancy, will start low dose of this again, 37.5mg daily and then increase to 75 mg daily in 2 weeks if doing well.   -f/u here in 3 weeks if doing well  - LORazepam (ATIVAN) 1 MG tablet; TAKE 1-2 TABLETS BY MOUTH TWICE DAILY AS NEEDED FOR ANXIETY  Dispense: 60 tablet; Refill: 0    2. Palpitations  -Will check basic labs as listed below, she does have an event monitor as well to mail in for evaluation, will await test results.   - HM1(CBC and Differential)  - Erythrocyte Sedimentation Rate  - Comprehensive Metabolic Panel  - C-Reactive Protein  - HM1 (CBC with Diff)      Return in about 3 weeks (around 2020).      Michelle Medina MD      PROGRESS NOTE   2020    SUBJECTIVE:  Tonya Thayer is a 24 y.o. female  who presents for follow up on anxiety.     She did stop the escitalopram about two weeks ago as she thought that it was making things worse. She doesn't feel that she is having as much panic but does still have terrible anxiety.     She was at [place] and is going to see a grief counselor in [place] as well on 20. Before her mother  she was still feeling tired all the time and was getting headaches off and on. She does now get headaches daily. She did think that initially that her fatigue was due to her work schedule but her partner works two times what she does and is fine.     She has been on buspirone as well, and that did not help. It did make her panic badly. She was on venlafaxine previously but did stop that when she got pregnant. She has been on sertraline, citalopram and they both caused things to get worse. She is willing to try anything again.     THe last two nights she has woken up with her heart racing. She woke up multiple times and felt like she  couldn't catch her breath. She didn't feel anxious about it anddidn't get numb or had issues with this. She felt ok about it and then it would go away, did take an anxiety pill at 0700. She does usually have only 1-2 cans of soda per day. She will have about one cigarette per day. She does have an event monitor at home that she needs to mail in.     Chief Complaint   Patient presents with     Medication Management     Patient is here today to review current medications.     Anxiety     Patient would like to discus anxiety. She is unsure if her current symptoms are related to anxiety. Has woken up the past two nights to her heart racing, having daily headaches, chest pressure making it hard to breath, feeling dizzy and fatigue.          Patient Active Problem List   Diagnosis     Hereditary Hemolytic Spherocytosis     Allergic Rhinitis     Asplenia     Elevated liver enzymes     Panic disorder     Thrombocytosis (H)     ZION (generalized anxiety disorder)     Nonintractable migraine     Moderate major depression (H)       Current Outpatient Medications   Medication Sig Dispense Refill     acetaminophen (TYLENOL) 325 MG tablet Take 1-2 tablets (325-650 mg total) by mouth every 4 (four) hours as needed.  0     ibuprofen (ADVIL,MOTRIN) 200 MG tablet Take 400 mg by mouth every 6 (six) hours as needed for pain.       LORazepam (ATIVAN) 1 MG tablet TAKE 1-2 TABLETS BY MOUTH TWICE DAILY AS NEEDED FOR ANXIETY 60 tablet 0     ondansetron (ZOFRAN) 4 MG tablet Take 1 tablet (4 mg total) by mouth every 8 (eight) hours as needed for nausea. 30 tablet 1     venlafaxine (EFFEXOR-XR) 37.5 MG 24 hr capsule Take 1 capsule (37.5 mg total) by mouth daily. 30 capsule 0     No current facility-administered medications for this visit.        Social History     Tobacco Use   Smoking Status Current Every Day Smoker     Years: 3.00     Last attempt to quit: 2017     Years since quittin.7   Smokeless Tobacco Never Used   Tobacco  Comment    1.5 cigs a day.            OBJECTIVE:        Recent Results (from the past 240 hour(s))   Erythrocyte Sedimentation Rate   Result Value Ref Range    Sed Rate 8 0 - 20 mm/hr   Comprehensive Metabolic Panel   Result Value Ref Range    Sodium 138 136 - 145 mmol/L    Potassium 4.3 3.5 - 5.0 mmol/L    Chloride 104 98 - 107 mmol/L    CO2 25 22 - 31 mmol/L    Anion Gap, Calculation 9 5 - 18 mmol/L    Glucose 80 70 - 125 mg/dL    BUN 10 8 - 22 mg/dL    Creatinine 0.60 0.60 - 1.10 mg/dL    GFR MDRD Af Amer >60 >60 mL/min/1.73m2    GFR MDRD Non Af Amer >60 >60 mL/min/1.73m2    Bilirubin, Total 0.9 0.0 - 1.0 mg/dL    Calcium 9.6 8.5 - 10.5 mg/dL    Protein, Total 7.5 6.0 - 8.0 g/dL    Albumin 4.2 3.5 - 5.0 g/dL    Alkaline Phosphatase 49 45 - 120 U/L    AST 10 0 - 40 U/L    ALT <9 0 - 45 U/L   C-Reactive Protein   Result Value Ref Range    CRP <0.1 0.0 - 0.8 mg/dL   HM1 (CBC with Diff)   Result Value Ref Range    WBC 10.6 4.0 - 11.0 thou/uL    RBC 4.48 3.80 - 5.40 mill/uL    Hemoglobin 14.1 12.0 - 16.0 g/dL    Hematocrit 39.9 35.0 - 47.0 %    MCV 89 80 - 100 fL    MCH 31.4 27.0 - 34.0 pg    MCHC 35.2 32.0 - 36.0 g/dL    RDW 12.2 11.0 - 14.5 %    Platelets 657 (H) 140 - 440 thou/uL    MPV 6.7 (L) 7.0 - 10.0 fL    Neutrophils % 66 50 - 70 %    Lymphocytes % 25 20 - 40 %    Monocytes % 6 2 - 10 %    Eosinophils % 2 0 - 6 %    Basophils % 1 0 - 2 %    Neutrophils Absolute 7.0 2.0 - 7.7 thou/uL    Lymphocytes Absolute 2.7 0.8 - 4.4 thou/uL    Monocytes Absolute 0.7 0.0 - 0.9 thou/uL    Eosinophils Absolute 0.2 0.0 - 0.4 thou/uL    Basophils Absolute 0.1 0.0 - 0.2 thou/uL       Vitals:    01/14/20 1514   BP: 118/70   Patient Site: Right Arm   Patient Position: Sitting   Cuff Size: Adult Regular   Pulse: (!) 124   SpO2: 98%   Weight: 155 lb 14.4 oz (70.7 kg)     Weight: 155 lb 14.4 oz (70.7 kg)          Physical Exam:  GENERAL APPEARANCE: A&A, NAD, well hydrated, well nourished  SKIN:  Normal skin turgor, no lesions/rashes    HEENT: moist mucous membranes, no rhinorrhea  NECK: Normal  CV: RRR, no M/G/R   LUNGS: CTAB  EXTREMITY: no edema   NEURO: no gross deficits   PSYCH: Affect is anxious, she is well dressed and groomed, normal thought process and speech pattern, not tearful today

## 2021-06-05 NOTE — PROGRESS NOTES
ASSESSMENT/PLAN:       1. Anxiety  -She is going to start taking her medication in the morning rather than at night, work and increase it to 75 mg orally daily and she will follow-up here in approximately 1 month for recheck.  She will call sooner if there are issues or concerns.  - venlafaxine (EFFEXOR-XR) 75 MG 24 hr capsule; Take 1 capsule (75 mg total) by mouth daily.  Dispense: 30 capsule; Refill: 2      Return in about 4 weeks (around 3/4/2020).      Michelle Medina MD      PROGRESS NOTE   2/5/2020    SUBJECTIVE:  Tonya Thayer is a 24 y.o. female  who presents for follow up.     She does find that the medication has made her symptoms more tolerable but she is still not sleeping at night. She is just not tired, she doesn't feel like she is tired until 3AM. She does not have any issues with racing thoughts.     She did have two periods this last month.   Chief Complaint   Patient presents with     Anxiety     Patient is here today for a one month follow up since restarting the venlafaxine. Patient still struggles to sleep at night. Patient feels like she is able to manage her panic attacks better but stil having panic attacks.      Menstrual Problem     Patient had two menstrual cycles within the last month. Went two weeks in between without bleeding.          Patient Active Problem List   Diagnosis     Hereditary Hemolytic Spherocytosis     Allergic Rhinitis     Asplenia     Elevated liver enzymes     Panic disorder     Thrombocytosis (H)     ZION (generalized anxiety disorder)     Nonintractable migraine     Moderate major depression (H)       Current Outpatient Medications   Medication Sig Dispense Refill     acetaminophen (TYLENOL) 325 MG tablet Take 1-2 tablets (325-650 mg total) by mouth every 4 (four) hours as needed.  0     ibuprofen (ADVIL,MOTRIN) 200 MG tablet Take 400 mg by mouth every 6 (six) hours as needed for pain.       LORazepam (ATIVAN) 1 MG tablet TAKE 1-2 TABLETS BY MOUTH  TWICE DAILY AS NEEDED FOR ANXIETY 60 tablet 0     ondansetron (ZOFRAN) 4 MG tablet Take 1 tablet (4 mg total) by mouth every 8 (eight) hours as needed for nausea. 30 tablet 1     venlafaxine (EFFEXOR-XR) 75 MG 24 hr capsule Take 1 capsule (75 mg total) by mouth daily. 30 capsule 2     No current facility-administered medications for this visit.        Social History     Tobacco Use   Smoking Status Current Every Day Smoker     Years: 3.00     Last attempt to quit: 2017     Years since quittin.7   Smokeless Tobacco Never Used   Tobacco Comment    1.5 cigs a day.            OBJECTIVE:        No results found for this or any previous visit (from the past 240 hour(s)).    Vitals:    20 1527   BP: 104/60   Patient Site: Left Arm   Patient Position: Sitting   Cuff Size: Adult Regular   Pulse: 97   SpO2: 98%   Weight: 159 lb (72.1 kg)     Weight: 159 lb (72.1 kg)          Physical Exam:  GENERAL APPEARANCE: A&A, NAD, well hydrated, well nourished  SKIN:  Normal skin turgor, no lesions/rashes   Psych: Her affect is appropriate, she is casually dressed and groomed, she is not tearful today  EXTREMITY: no edema   NEURO: no gross deficits

## 2021-06-05 NOTE — TELEPHONE ENCOUNTER
Prior Authorization Request  Who s requesting:  Pharmacy  Pharmacy Name and Location: [facility]  Medication Name: venlafaxine (EFFEXOR-XR) 37.5 MG 24 hr capsule  Insurance Plan: [employer]   Insurance Member ID Number:  [id number]  CoverMyMeds Key: [id number]  Informed patient that prior authorizations can take up to 10 business days for response:   NA  Okay to leave a detailed message: No

## 2021-06-07 NOTE — TELEPHONE ENCOUNTER
Called and left the patient a voicemail informing her that Dr. Medina is out of the clinic today ill. Therefore we will need to reschedule her med check telephone call to a later date or if patient feels comfortable, we have schedule the telephone visit with another provider.

## 2021-06-07 NOTE — TELEPHONE ENCOUNTER
Controlled Substance Refill Request  Medication Name:   Requested Prescriptions     Pending Prescriptions Disp Refills     LORazepam (ATIVAN) 1 MG tablet 60 tablet 0     Sig: TAKE 1-2 TABLETS BY MOUTH TWICE DAILY AS NEEDED FOR ANXIETY     venlafaxine (EFFEXOR-XR) 37.5 MG 24 hr capsule   0     Date Last Fill: Lorazepam: 1/14/2020  Effexor: 2/06/2020  [facility]ed [facility]acy: [facility]   Submit electronically to pharmacy  Controlled Substance Agreement on file:  None at this time   Encounter-Level CSA Scan Date - 01/24/2018:    Scan on 1/26/2018 10:23 AM: HE        Last office visit:  2/5/2020

## 2021-06-07 NOTE — TELEPHONE ENCOUNTER
Last Med Check: 2/5/2020    Next med check due on: 3/4/2020    CSA on File: none     Future Appointment Scheduled ? No    Last Med Refill? Effexor: 2/6/2020  Lorazepam: 1/14/2020  Rodolfo Toscano MA

## 2021-06-07 NOTE — PROGRESS NOTES
"Tonya Thayer is a 24 y.o. female who is being evaluated via a billable telephone visit.      The patient has been notified of following:     \"This telephone visit will be conducted via a call between you and your physician/provider. We have found that certain health care needs can be provided without the need for a physical exam.  This service lets us provide the care you need with a short phone conversation.  If a prescription is necessary we can send it directly to your pharmacy.  If lab work is needed we can place an order for that and you can then stop by our lab to have the test done at a later time.    Telephone visits are billed at different rates depending on your insurance coverage. During this emergency period, for some insurers they may be billed the same as an in-person visit.  Please reach out to your insurance provider with any questions.    If during the course of the call the physician/provider feels a telephone visit is not appropriate, you will not be charged for this service.\"    Patient has given verbal consent to a Telephone visit? Yes    Tonya Thayer complains of positive pregnancy test.   Chief Complaint   Patient presents with     Amenorrhea     Patient wanting to confirm pregnancy. Has had two at home pregnancy tests. Patient unsure when the first day of her last menstrual cycle was. Does have normal cycles but not tracking them. Believes she have have bled in early March. Patient has been having nasuea, breast tenderness and feeling fatigue.      Medication Refill     Patient would like a refill on Zofran.        I have reviewed and updated the patient's Past Medical History, Social History, Family History and Medication List.    ALLERGIES  Compazine [prochlorperazine]; Levofloxacin; Buspirone; Tramadol; Amoxicillin; Compazine [prochlorperazine edisylate]; and Penicillins    Additional provider notes: insert own note template here       SUBJECTIVE:   Tonya Thayer is a " 24 y.o.  presents c/o missed period, nausea, breast tenderness for past 1 weeks. No LMP recorded. Unsure LMP. Her periods are not regular. Home UPTs x1 on 20 were positive, so she believes she is pregnant. She is happy about this. Not taking PNV.  No FM yet. No bleeding or cramping.  She does not have any exposure to cat litter.  She is aware to avoid using hot tubs or jacuzzi's for which the temperature cannot be adjusted.  She has no other concerns.      Current Outpatient Medications:      LORazepam (ATIVAN) 1 MG tablet, TAKE 1-2 TABLETS BY MOUTH TWICE DAILY AS NEEDED FOR ANXIETY, Disp: 60 tablet, Rfl: 0     ondansetron (ZOFRAN) 4 MG tablet, Take 1 tablet (4 mg total) by mouth every 8 (eight) hours as needed for nausea., Disp: 30 tablet, Rfl: 1     venlafaxine (EFFEXOR-XR) 37.5 MG 24 hr capsule, Take 1 capsule (37.5 mg total) by mouth daily for 7 days, THEN 1 capsule (37.5 mg total) every other day for 7 days., Disp: 10 capsule, Rfl: 0  Past Medical History:   Diagnosis Date     Anxiety      Asplenia      Cocaine use      Depression      Fever and chills      Hereditary spherocytosis (H)      Leukocytosis 2016     Pyelonephritis 11/15/2016     Sepsis, due to unspecified organism      SIRS (systemic inflammatory response syndrome) (H) 10/12/2019     UTI (urinary tract infection)      Social History     Socioeconomic History     Marital status: Single     Spouse name: Not on file     Number of children: Not on file     Years of education: Not on file     Highest education level: Not on file   Occupational History     Not on file   Social Needs     Financial resource strain: Not on file     Food insecurity     Worry: Not on file     Inability: Not on file     Transportation needs     Medical: Not on file     Non-medical: Not on file   Tobacco Use     Smoking status: Former Smoker     Years: 3.00     Last attempt to quit: 2017     Years since quittin.9     Smokeless tobacco: Never Used      Tobacco comment: 1.5 cigs a day.    Substance and Sexual Activity     Alcohol use: Yes     Comment: 1 beer every few months      Drug use: No     Sexual activity: Yes     Partners: Male   Lifestyle     Physical activity     Days per week: Not on file     Minutes per session: Not on file     Stress: Not on file   Relationships     Social connections     Talks on phone: Not on file     Gets together: Not on file     Attends Faith service: Not on file     Active member of club or organization: Not on file     Attends meetings of clubs or organizations: Not on file     Relationship status: Not on file     Intimate partner violence     Fear of current or ex partner: Not on file     Emotionally abused: Not on file     Physically abused: Not on file     Forced sexual activity: Not on file   Other Topics Concern     Not on file   Social History Narrative    Lives at home with mother and brother.     OB History    Para Term  AB Living   1             SAB TAB Ectopic Multiple Live Births                  # Outcome Date GA Lbr Yordan/2nd Weight Sex Delivery Anes PTL Lv   1                 OBJECTIVE:   There were no vitals taken for this visit.    General:  Denies fever, chills, HA, fatigue, myalgias, weight change    Eyes: Denies vision changes   Ears/Nose/Throat: Denies nasal congestion, rhinorrhea, ear pain or discharge, sore throat, swollen glands  Cardiovascular: CP, palpitations  Respiratory:  SOB, cough  Gastrointestinal:  Denies changes in bowel habits, melena, rectal bleeding,  Genitourinary: Denies changes in urine habits/frequency/dysuria, hematuria   Musculoskeletal:  Denies  joint pain or swelling or erythema, edema  Skin: Denies rashes   Neurologic: Denies weakness, paresthesia  Psychiatric: Denies mood changes   Endocrine: Denies polyuria, polydipsia, polyphagia  Heme/Lymphatic: Denies problem with bleeding   Allergic/Immunologic: Denies problem       No results found for this or any  previous visit (from the past 240 hour(s)).       Michelle Medina MD         Assessment/Plan:  1. Pregnancy test positive  -   at Unknown .  Education: reviewed prenatal folder, info given on [facility] website. Avoid alcohol and tobacco; minimize caffeine to <2 servings per day; advised regular physical activity; avoid abdominal trauma;  drink >/= 8 cups water/fluids daily; start/continue PNV.  Discussed morning sickness and the importance of frequent small meals in order to decrease nausea.  If she gets to the point that she is unable to keep food/liquids down, she should contact me as we can provide her with medications to help.    Patient given information on pregnancy today.  Next visit 4-6 weeks depending on how far along she is  Will get an ultrasound to establish EDC.  Order placed today.    All questions answered.   - US OB < 14 Weeks    2. Nausea and vomiting during pregnancy  -has done well with zofran in the past, will renew this for her today  - ondansetron (ZOFRAN) 4 MG tablet; Take 1 tablet (4 mg total) by mouth every 8 (eight) hours as needed for nausea.  Dispense: 30 tablet; Refill: 1    3. ZION (generalized anxiety disorder)  -Will wean down on venlafaxine and consider starting sertraline in pregnancy if needed, will watch mood for now.   - venlafaxine (EFFEXOR-XR) 37.5 MG 24 hr capsule; Take 1 capsule (37.5 mg total) by mouth daily for 7 days, THEN 1 capsule (37.5 mg total) every other day for 7 days.  Dispense: 10 capsule; Refill: 0        Phone call duration:  17 minutes    Michelle Medina MD

## 2021-06-07 NOTE — TELEPHONE ENCOUNTER
Please let the patient know that I refilled her medications but she is due for a follow up.     I'd love to do this with a phone visit if she is ok with this.

## 2021-06-08 NOTE — PROGRESS NOTES
Chief Complaint   Patient presents with     Emesis     started today. Would like a pregnancy test.  Not on any birth control. Took an at home test and it was negative. around the first spotted for 1 day. LMP beginning of January.       History of Present Illness:     The patient is concerned about missed menstrual period.  The patient's last Menstrual period was about 1 month ago.  Her menstrual periods are generally regular and on time.  She took a home pregnancy test and it was negative.  The patient is not on OCP's and had recent unprotected sex.  She denies any vaginal discharge, burning and is not concerned about STD's.    Urine pregnancy test here is negative, the patient did not want to discuss contraception plans at this moment.  Encouraged safe sex practices and STD screen which she declined.    Review of systems: See history of present illness, otherwise negative.   Current Outpatient Prescriptions   Medication Sig Dispense Refill     acetaminophen (TYLENOL) 500 MG tablet Take 500 mg by mouth every 6 (six) hours as needed for pain.       omeprazole (PRILOSEC) 20 MG capsule Take 1 capsule (20 mg total) by mouth Daily before breakfast. 14 capsule 0     No current facility-administered medications for this visit.       Past Medical History:   Diagnosis Date     Asplenia      Fever and chills      Hereditary spherocytosis      Leukocytosis 7/18/2016     Sepsis, due to unspecified organism       Past Surgical History:   Procedure Laterality Date     WA LAP,CHOLECYSTECTOMY      Description: Cholecystectomy Laparoscopic;  Recorded: 10/07/2009;     [place] REMOVAL SPLEEN, TOTAL      Description: Splenectomy;  Recorded: 12/17/2012;  Comments: 11/12 secondary to hereditary spherocytosis. Recieved pneumovax, HIB and meningococcal vaccine prior to splenectomy. Needs pneumovax every 5 yrs for 2 doses and is on erythromycin prophylaxis. If gets fever >100.5, needs white count, differential, blood culture and strong  consideration of rocephin 50-75 mg/kg. Should have CXR for any pul*     [place] REMOVAL SPLEEN, TOTAL      Description: Splenectomy;  Recorded: 09/20/2014;  Comments: 11/2012 secondary to hereditary spherocytosis. Recieved pneumovax, HIB and meningococcal vaccine prior to splenectomy. Needs pneumovax every 5 yrs for 2 doses. If gets fever >100.5, needs white count, differential, blood culture and strong consideration of rocephin 50-75 mg/kg. Should have CXR for any pulmonary symptoms. All above recomm*     SPLENECTOMY        Social History     Social History     Marital status: Single     Spouse name: N/A     Number of children: N/A     Years of education: N/A     Social History Main Topics     Smoking status: Current Every Day Smoker     Packs/day: 0.50     Years: 3.00     Smokeless tobacco: Never Used     Alcohol use No     Drug use: No     Sexual activity: Not Asked     Other Topics Concern     None     Social History Narrative    Lives at home with mother and brother.      History   Smoking Status     Current Every Day Smoker     Packs/day: 0.50     Years: 3.00   Smokeless Tobacco     Never Used      Physical Exam:   Blood pressure 112/64, pulse (!) 110, temperature 98.5  F (36.9  C), temperature source Oral, resp. rate 24, weight 132 lb 12.8 oz (60.2 kg), last menstrual period 01/03/2017, SpO2 97 %, not currently breastfeeding.   General: 20 y/o female apears anxious.  HEENT: Atraumatic, normocephalic. EOMI intact. Pupils reactive to light.  CVS: Sinus tachycardia. No peripheral edema noted.  Respiratory: Lungs are clear to auscultation bilaterally, no wheezing.  Gastrointestinal: Abdomen is soft, non-tender with normal bowel sounds.  : exam deferred.  Psychiatry: Flat affect, appears anxious and somewhat nervous.     No results found for this or any previous visit (from the past 24 hour(s)).   Assessment/Plan   1. Pregnancy test negative     2. Missed period  Pregnancy (Beta-hCG, Qual), Urine     Discussed with  the patient that her uine pregnancy test here is negative, the patient did not want to discuss contraception plans at this moment. Encouraged safe sex practices and STD screen which she declined.    Betty Lu,DO  Internal Medicine

## 2021-06-08 NOTE — PROGRESS NOTES
Name: Tonya Thayer  Age: 21 y.o.  Gender: female  : 1995  Date of Encounter: 2017      HPI:  Tonya Thayer is a 21 y.o.  female with history of asplenia secondary to hereditary spherocytosis( up to date on immunizations) who presents to the clinic with concerns of cough.  Reports runny nose, head congestion, cough and diarrhea started 3 days earlier.  Stools are loose with 2 episodes per day.  No blood in stool.  Drinking fluids well and voiding normally.  Denies fever, chills, sore throat, ear pain, face pain, wheezing, shortness of breath, nausea, vomiting and skin rash.  Has been using dayquil, nyquil and vitamin C.  Sleeping ok at night. No one else at home ill.  Works with the public. Needs work note.    Current Medication:   Medications reviewed and updated.     Current Outpatient Prescriptions:      omeprazole (PRILOSEC) 20 MG capsule, Take 1 capsule (20 mg total) by mouth Daily before breakfast., Disp: 14 capsule, Rfl: 0    Past Med / Surg History:  Past Medical History   Diagnosis Date     Asplenia      Fever and chills      Hereditary spherocytosis      Leukocytosis 2016     Sepsis, due to unspecified organism      Past Surgical History   Procedure Laterality Date     Pr lap,cholecystectomy       Description: Cholecystectomy Laparoscopic;  Recorded: 10/07/2009;     Pr removal spleen, total       Description: Splenectomy;  Recorded: 2012;  Comments:  secondary to hereditary spherocytosis. Recieved pneumovax, HIB and meningococcal vaccine prior to splenectomy. Needs pneumovax every 5 yrs for 2 doses and is on erythromycin prophylaxis. If gets fever >100.5, needs white count, differential, blood culture and strong consideration of rocephin 50-75 mg/kg. Should have CXR for any pul*     Pr removal spleen, total       Description: Splenectomy;  Recorded: 2014;  Comments: 2012 secondary to hereditary spherocytosis. Recieved pneumovax, HIB and meningococcal  vaccine prior to splenectomy. Needs pneumovax every 5 yrs for 2 doses. If gets fever >100.5, needs white count, differential, blood culture and strong consideration of rocephin 50-75 mg/kg. Should have CXR for any pulmonary symptoms. All above recomm*     Splenectomy         Fam / Soc History:  Family History   Problem Relation Age of Onset     Clotting disorder Brother      Hereditary Spherocytosis     Social History     Social History     Marital status: Single     Spouse name: N/A     Number of children: N/A     Years of education: N/A     Occupational History     Not on file.     Social History Main Topics     Smoking status: Former Smoker     Packs/day: 0.50     Years: 3.00     Smokeless tobacco: Never Used     Alcohol use No     Drug use: No     Sexual activity: Not on file     Other Topics Concern     Not on file     Social History Narrative    Lives at home with mother and brother.       ROS:  14 point review of systems unremarkable except as mentioned in HPI    Objective:  Vitals:   Visit Vitals     /62     Pulse 100     Temp 98.3  F (36.8  C) (Oral)     Resp 18     Wt 129 lb 12.8 oz (58.9 kg)     LMP 12/12/2016 (Approximate)     SpO2 99%     Breastfeeding No     BMI 23.36 kg/m2       Gen: Alert, awake, well, nontoxic appearing  HEENT: NC, AT,   Ears:  Ear canals clear.  TMs normal appearing.  Eyes:   Pupils equally round and reactive to light. Conjunctivae clear.  Sclera non-icteric.  Nose:  Nasal mucosa boggy with clear mucous, septum midline.  No sinus tenderness  Mouth:  MMM. Oropharynx clear. No tonsillar exudate. Teeth, gum, tongue and lips clear.  Neck:  Supple, FROM, No lymphandenpathy, No TM  Heart: Regular rate and rhythm; normal S1 and S2; no murmurs, gallops, or rubs.  Peripheral Vessels: Normal pulses and perfusion.  Lungs: Unlabored respirations; symmetric chest expansion; clear breath sounds.  Abdomen:  Bowel sounds present.  Soft, non tender, non distended, no guarding or rebound, no  hepatosplenomegaly,  No masses palpable.  Extremities: No clubbing, cyanosis, or edema. Normal upper and lower extremities.  Skin: Normal turgor and without lesions or rashes.  Mental Status: Alert, oriented, in no distress. Mood and affect appropriate.     Assessment:  Viral illness    Plan:  Advised rest, fluids,humidified air, tylenol for discomfort, bland diet avoiding caffeine, greasy and spicy foods.  Advised if develops fever, worsening respiratory symptoms such as ear pain, face pain or shortness of breath, nausea,vomiting, increasing number of stools or bloody stools should be reevaluated  Patient voiced understanding and was in agreement with plan.    Katy Gonzalez MD  1/9/2017

## 2021-06-08 NOTE — PROGRESS NOTES
"Tonya Thayer is a 24 y.o. female who is being evaluated via a billable video visit.      The patient has been notified of following:     \"This video visit will be conducted via a call between you and your physician/provider. We have found that certain health care needs can be provided without the need for an in-person physical exam.  This service lets us provide the care you need with a video conversation.  If a prescription is necessary we can send it directly to your pharmacy.  If lab work is needed we can place an order for that and you can then stop by our lab to have the test done at a later time.    Video visits are billed at different rates depending on your insurance coverage. Please reach out to your insurance provider with any questions.    If during the course of the call the physician/provider feels a video visit is not appropriate, you will not be charged for this service.\"    Patient has given verbal consent to a Video visit? Yes    Patient would like to receive their AVS by AVS Preference: Olu.    Patient would like the video invitation sent by: Text to cell phone: [phone]    Will anyone else be joining your video visit? No        Video Start Time: 11:39 AM    Additional provider notes:    ASSESSMENT/PLAN:       1. Encounter for counseling regarding contraception  -We discussed risks and benefits of different forms of birth control including oral contraceptive pills, NuvaRing, Depo-Provera, Nexplanon and hormonal and nonhormonal IUDs.  Given her history of migraine I think proceeding with a nonhormonal or progesterone only form of birth control would be best which she is agreeable to.  Given the option she would like to proceed with Nexplanon insertion.  She will follow-up at her convenience for insertion, remain abstinent 2 weeks prior to the procedure and plan for pregnancy testing when she arrives at the clinic.      No follow-ups on file.      PROGRESS NOTE   " 2020    SUBJECTIVE:  Tonya Thayer is a 24 y.o. female  who presents for birth control discussion.     She did just have a positive pregnancy test and had an  on 20. She did bleed for about one week after and did take a negative pregnancy test a few days ago. She was asked about birth control and wasn't sure about that with her . She was worried about this being ineffective. She does want to be on birth control. The pills made her gain weight as well, it was water weight.      She has done the rhythm method and pullout method for many years but since getting pregnant most recently it does not seem to be working as well.  She is no longer with her partner but does not want to have any further unplanned pregnancies.  She does have a history of migraine headaches as well.      Chief Complaint   Patient presents with     Contraception     Patient wanting to discuss methods of birth control. Patient did not like taking birth control tablets, found herself forgetting to take them. Patient did have a  on 20 and did bleed for one week after. Patient did take a pregnancy test on 20 and it was negative.         Patient Active Problem List   Diagnosis     Hereditary Hemolytic Spherocytosis     Allergic Rhinitis     Asplenia     Elevated liver enzymes     Panic disorder     Thrombocytosis (H)     ZION (generalized anxiety disorder)     Nonintractable migraine     Moderate major depression (H)       Current Outpatient Medications   Medication Sig Dispense Refill     LORazepam (ATIVAN) 1 MG tablet TAKE 1-2 TABLETS BY MOUTH TWICE DAILY AS NEEDED FOR ANXIETY 60 tablet 0     ondansetron (ZOFRAN) 4 MG tablet Take 1 tablet (4 mg total) by mouth every 8 (eight) hours as needed for nausea. 30 tablet 1     No current facility-administered medications for this visit.        Social History     Tobacco Use   Smoking Status Current Every Day Smoker     Years: 3.00     Last attempt to quit:  4/27/2017     Years since quitting: 3.1   Smokeless Tobacco Never Used   Tobacco Comment    2 to 3 cigs a day.            OBJECTIVE:        No results found for this or any previous visit (from the past 240 hour(s)).    Vitals:    06/02/20 1126   Weight: 162 lb (73.5 kg)     Weight: 162 lb (73.5 kg) (pt reported)       GENERAL: Healthy, alert and no distress  EYES: Eyes grossly normal to inspection. No discharge or erythema, or obvious scleral/conjunctival abnormalities.  RESP: No audible wheeze, cough, or visible cyanosis.  No visible retractions or increased work of breathing.    NEURO: Cranial nerves grossly intact. Mentation and speech appropriate for age.  PSYCH: Mentation appears normal, affect normal/bright, judgement and insight intact, normal speech and appearance well-groomed      Video-Visit Details    Type of service:  Video Visit    Video End Time (time video stopped): 12:00  Originating Location (pt. Location): Home    Distant Location (provider location):  [facility]/OB     Platform used for Video Visit: Deb Medina MD

## 2021-06-08 NOTE — TELEPHONE ENCOUNTER
Controlled Substance Refill Request  Medication Name:   Requested Prescriptions     Pending Prescriptions Disp Refills     LORazepam (ATIVAN) 1 MG tablet 60 tablet 0     Sig: TAKE 1-2 TABLETS BY MOUTH TWICE DAILY AS NEEDED FOR ANXIETY     Date Last Fill: 3/20/20  [facility]  Submit electronically to pharmacy  Controlled Substance Agreement on file:   Encounter-Level CSA Scan Date - 01/24/2018:    Scan on 1/26/2018 10:23 AM: [facility]        Last office visit:  4/8/20

## 2021-06-09 NOTE — PROGRESS NOTES
"Procedures     ASSESSMENT:   1. Nexplanon insertion  - Pregnancy (Beta-hCG, Qual), Urine      Nexplanon was inserted today without complications or problems.  Patient recently was on her menses, upt negative today. Bleeding was well controlled following procedure and all questions answered.  She will follow up on an as needed basis.    Michelle Medina MD    SUBJECTIVE:  Tonya Thayer is a 24 y.o. female  who presents to clinic for Nexplanon insertion.  Her non-dominant arm is her right.  She has been counseled regarding the efficacy, bleeding pattern changes, side effect profile, and given a description of the insertion and removal procedure.  This is all reviewed with her today and she gives full informed consent, signing the form.  Questions were answered.    Patient's last menstrual period was 2020 (approximate).      Allergies   Allergen Reactions     Compazine [Prochlorperazine] Shortness Of Breath, Anxiety, Palpitations and Dizziness     Levofloxacin Shortness Of Breath     20 minutes after levofloxacin infusion patient felt short of breath and warm.  She had some obvious facial flushing     Buspirone Other (See Comments)     Troubles sleeping, restless feeling.      Tramadol Nausea And Vomiting     Amoxicillin Itching and Rash     Compazine [Prochlorperazine Edisylate] Palpitations     Penicillins Itching and Rash       OBJECTIVE:  /80   Pulse 84   Temp 98.6  F (37  C)   Ht 5' 3\" (1.6 m)   Wt 155 lb (70.3 kg)   LMP 2020 (Approximate)   Breastfeeding No   BMI 27.46 kg/m    Lab Results   Component Value Date    PREGTESTUR Negative 2020       Risks, benefits and alternatives to procedure were discussed with the patient.  We discussed possible complications and risks including infection, bleeding, pain and failure.  Written consent was obtained prior to the procedure and is detailed in the patient's record.  A pregnancy test was performed and confirmed negative. "      Procedure: Nexplanon Insertion    With the patient lying on her back with her nondominant arm flexed at the elbow and the arm externally rotated, the insertion site was identified using a marker. The skin was marked 8 cm above the medial epicondyle and 3 cm below the bicep groove.  Betadine sterile prep was done.  3 mL of 1% lidocaine was injected just under the skin along the planned insertion tunnel.  The Nexplanon applicator was then removed from packaging and the needle shield removed.  The Nexplanon jevon was visualized.    Using an 11 blade, a stab incision was made at the insertion site through the skin.  The Nexplanon needle was then entered at this site and lowered to a horizontal position.  Keeping the needle in the sub-dermal connective tissue, the nexplanon was inserted to its full length.  The lever was cocked upon withdrawal and the capsule dropped into the subdermal connective tissue.   Insertion was confirmed by visual inspection of the tip fo the needle and palpation of the patient's arm.     The surgical site was then washed with water, then dried. A gauze pressure bandage was then applied to reduce bruising.      Complications: There were no complications and hemostasis was achieved.      The patient was advised that the implant should be removed within 3 years.      Postoperative instructions: Patient should call if she has any pain not relieved by ibuprofen or Tylenol.  The gauze dressing is intended to help reduce bruising.  It should not be tight.  This can be removed in the morning, sooner if too tight.  Patient was able to palpate the implant.  She was given a card identifying the implantable device.  She understands that it should be removed within 3 years.

## 2021-06-11 NOTE — TELEPHONE ENCOUNTER
Last Med Check: 2/5/20    Next med check due on: 10/5/20    CSA on File: NO    Future Appointment Scheduled ? YES    Last Med Refill? 5/27/20    Kasey Michel, CMA

## 2021-06-11 NOTE — PROGRESS NOTES
"Tonya Thayer is a 21-year-old with a history of anxiety and panic disorder with underlying hereditary spherocytosis who presents today for follow-up.  Please see previous notes including phone notes and emails.  She mentions that the increased dose of Effexor is helped somewhat but she continues to have a sense of anxiety and near constant panic.  She has a difficult time sleeping and her appetite is down.  She was recently fired from her job because of her attendance and difficulty focusing and concentrating and getting things done.  She is starting a new job and while she enjoys this she is anxious about this job as well.  She is recently moved back home living with her parents to help provide additional support.  She has a counseling appointment on Monday the 24th and I encouraged her to follow through with that.    She and her boyfriend are sexually active but they are using barrier methods and she is not interested in any other type of contraception.  We reviewed how her medications may not be ideal for pregnancy and she has no plans on becoming pregnant.    There is a family history of a sister with alcoholic chemical dependency mother with chronic pain syndrome and anxiety/depression    Objective:/70  Pulse 98  Resp 18  Ht 5' 3.5\" (1.613 m)  Wt 134 lb (60.8 kg)  LMP 05/18/2017  Breastfeeding? No  BMI 23.36 kg/m2  Anxious appearing young woman in no obvious distress  She does come across as being anxious and she has a somewhat downcast gaze and soft voice  Please see PHQ 9 and ZION forms  Total visit time is greater than 25 minutes with majority spent in counseling regarding her anxiety    Assessment: Anxiety disorder with panic    Plan: Since the lorazepam did not seem to be adequately resolving her symptoms and she lost her prescriptions so it cannot be replaced we will have her start clonazepam instead and continue on the higher dose of Effexor while continuing with counseling.  He is " given a note for work and she will follow-up with me in 1-2 weeks and let me know how things are

## 2021-06-11 NOTE — TELEPHONE ENCOUNTER
Controlled Substance Refill Request  Medication Name:   Requested Prescriptions     Pending Prescriptions Disp Refills     LORazepam (ATIVAN) 1 MG tablet 60 tablet 0     Sig: TAKE 1-2 TABLETS BY MOUTH TWICE DAILY AS NEEDED FOR ANXIETY     Date Last Fill: 5/27/20  [facility]  Submit electronically to pharmacy  Controlled Substance Agreement on file:   Encounter-Level CSA Scan Date - 01/24/2018:    Scan on 1/26/2018 10:23 AM: [facility]        Last office visit:  6/22/20

## 2021-06-11 NOTE — PROGRESS NOTES
"Tonya Thayer is a 21-year-old with a history of anxiety/panic disorder and asplenia with dietary spherocytosis and recent ER visits who presents today for follow-up.  Please see ER notes and labs.  Over the last month she has had an increasing sense of anxiety.  The citalopram if anything seems to have not helped or made matters worse.  She feels the lorazepam helps but just is not enough and she requests a dose increase.  She has previously tried Zoloft and Prozac in the past without much benefit.  She has been in counseling in the past and is restarting on the 26th.  She has been off work associated with her medical problems over this last week and is currently on a medical leave having started that on the 19th.  I encouraged her to get me the paperwork for her FMLA.  We discussed stress management at length.  She cannot think of any new triggers or changes that resulted in the increase in her anxiety and panic.    She has had a cough with some congestion and is currently on Levaquin.  Her cough is generally nonproductive she has not had any other fevers chills earache sore throat or other acute symptoms.  She was exposed to her mother who also had a cough.    Objective:/62  Pulse 84  Temp 97.9  F (36.6  C) (Oral)   Resp 18  Ht 5' 3.5\" (1.613 m)  Wt 131 lb 4.8 oz (59.6 kg)  LMP 05/18/2017  SpO2 97%  Breastfeeding? No  BMI 22.89 kg/m2   Young appearing woman with occasional cough and who seems anxious  Ears are normal nose is clear mouth appears mildly erythematous neck supple without lymphadenopathy lungs are clear heart with a regular rate and rhythm without murmur lower extremities are free of edema skin appears normal with a tiny bruise where she had her lumbar puncture onto her lower back neurological examination is nonfocal she does come across as being anxious please see PHQ 9 and ZION forms    Assessment: Anxiety with panic disorder hereditary spherocytosis with asplenia and " URI    Plan: We will have her continue off work from the 19th through 3 July and she is to follow-up with me in 2 weeks follow through with counseling as already scheduled.  We will have her start Effexor 37.5 mg daily I have given her an increase in lorazepam and we discussed his careful use risks benefits side effects addictive potential.  I have asked her to finish out her Levaquin focusing on good nutrition fluids good handwashing and she will follow-up here otherwise as needed

## 2021-06-11 NOTE — PROGRESS NOTES
ASSESSMENT/PLAN:       1. ZION (generalized anxiety disorder)  -The patient and I spent some time discussing her options including medication, therapy and a combination of both.  She feels that she would do well in getting some mental health help and she is agreed to a referral for therapy.  Additionally she does like the idea of being started on the medication.  We discussed the appropriate use of something like Ativan and using this as needed for acute panic attacks, and the effect of something like citalopram.  Discussed that this will take 4-6 weeks to take full effect and she started on a low-dose and will need some dose titration likely.  She will follow-up here in approximately 3-4 weeks for recheck, sooner if things are worsening.  - Ambulatory referral to Psychology  - citalopram (CELEXA) 10 MG tablet; Take 1 tablet (10 mg total) by mouth daily.  Dispense: 30 tablet; Refill: 1  - LORazepam (ATIVAN) 0.5 MG tablet; Take 1 tablet (0.5 mg total) by mouth 2 (two) times a day as needed for anxiety.  Dispense: 10 tablet; Refill: 0    2. Major depression  -Please see above.  - Ambulatory referral to Psychology  - citalopram (CELEXA) 10 MG tablet; Take 1 tablet (10 mg total) by mouth daily.  Dispense: 30 tablet; Refill: 1        Michelle Medina MD      PROGRESS NOTE   6/6/2017    SUBJECTIVE:  Tonya Thayer is a 21 y.o. female  who presents for anxiety.     She has had symptoms for about one month. When she goes anywhere, goes in the car or even goes to the gas station her whole body will get cold or hot and she will feel like she is going to pass out. The only place she doesn't feel this way is at home. She has had anxiety in the past and has been in treatment for anxiety and depression in the past. Initially she thought that she was sick but her symptoms aren't going away.. She has been having weakness as well.     She talked to her mother and she thought that she was maybe having panic attacks. She  has been sent home from work multiple times because she has been having issues with focus. Her last job she lost because she was having issues with focusing and taking a lot of breaks. She can think of nothing new from a stress perspective other than quitting smoking about 2.5 months ago. There have been no big life events that have happened. It was maybe [id number]. She went to [place] and was in day treament. She was in treatment for about 3 weeks. They didn't seem to help at all. Because she was young they told her that there wasn't anything else she could use. Her mother, grandmother and sister all have depression and anxiety. Her mother is on medication but they don't know what. Her sister is not on medication due to having just had a baby. She does think that it was maybe fluoxetine and perhaps sertraline as well. She was on them only the time she was in treatment.       Chief Complaint   Patient presents with     Anxiety     Patient is here today to discuss her anxiety. Lately patient noticed if she leaves home to go anywhere she becomes very anxious, feels that her heart races and troubles breathing.         Patient Active Problem List   Diagnosis     Hereditary Hemolytic Spherocytosis     Allergic Rhinitis     Leukocytosis     Sepsis, due to unspecified organism     Asplenia     Back pain, unspecified location     Pyelonephritis     Cocaine use     Chest pain     Dehydration     RLQ abdominal pain     Elevated liver enzymes     Pelvic fluid collection     Nicotine dependence       Current Outpatient Prescriptions   Medication Sig Dispense Refill     acetaminophen (TYLENOL) 500 MG tablet Take 500 mg by mouth every 6 (six) hours as needed for pain.       citalopram (CELEXA) 10 MG tablet Take 1 tablet (10 mg total) by mouth daily. 30 tablet 1     LORazepam (ATIVAN) 0.5 MG tablet Take 1 tablet (0.5 mg total) by mouth 2 (two) times a day as needed for anxiety. 10 tablet 0     omeprazole (PRILOSEC) 20 MG capsule  Take 1 capsule (20 mg total) by mouth Daily before breakfast. 14 capsule 0     ondansetron (ZOFRAN ODT) 4 MG disintegrating tablet Take 1-2 tablets (4-8 mg total) by mouth every 8 (eight) hours as needed for nausea. 20 tablet 0     No current facility-administered medications for this visit.        History   Smoking Status     Former Smoker     Packs/day: 0.50     Years: 3.00     Quit date: 4/27/2017   Smokeless Tobacco     Never Used           OBJECTIVE:        No results found for this or any previous visit (from the past 240 hour(s)).    Vitals:    06/06/17 1631   BP: 110/68   Patient Site: Left Arm   Patient Position: Sitting   Cuff Size: Adult Regular   Pulse: 82   Weight: 130 lb 6.4 oz (59.1 kg)     Weight: 130 lb 6.4 oz (59.1 kg)        Physical Exam:  GENERAL APPEARANCE: A&A, NAD, well hydrated, well nourished  SKIN:  Normal skin turgor, no lesions/rashes   HEENT: moist mucous membranes, no rhinorrhea  NECK: Normal  CV: RRR, no M/G/R   LUNGS: CTAB  EXTREMITY: no edema   NEURO: no gross deficits   Psych: Her affect is anxious, she is casually dressed and groomed, her thought process and speech pattern are normal, no obvious hallucinations, she makes normal eye contact

## 2021-06-12 NOTE — PROGRESS NOTES
1. Anxiety        Medications Ordered   Medications     LORazepam (ATIVAN) 1 MG tablet     Sig: Take 1 tablet (1 mg total) by mouth 2 (two) times a day as needed for anxiety.     Dispense:  60 tablet     Refill:  0      Plan: We will continue her on her venlafaxine at 75 mg once daily.  She really does not think the Klonopin was helping her all, and she thinks Lorazepam helped her much more, so I will switch her back to the Lorazepam at 1 mg twice a day as needed and this prescription was sent in for her to her pharmacy.  She will take back the remaining Clonazepam that she has in her possession.  She will follow-up with 1 of our providers here within a month as her prior physician has left our practice.  Her anxiety scale and depression scale were very high today so we will need to recheck those next month.  She contracts for safety verbally with me today and she will let us know if she is feeling in any danger.  25 minutes together today more than 50% spent in counseling.    Subjective: 21-year-old female who is here today for anxiety recheck.  She has really had a tough time in the last month with her panic attacks and anxiety.  She was switched to clonazepam and she really does not feels helps her at all with her panic attacks.  She would like to get off of that and on to what she was on before.  She is also on Effexor and that seems to be helping her more than citalopram has just that she was on just prior to that.  She is very anxious and panicky and she is not actively suicidal at this time.  She is having a hard time working and getting really much of anything done during the day because of her disabling anxiety she has an appointment for her counselor within the next few weeks.

## 2021-06-12 NOTE — PROGRESS NOTES
Assessment:  1.  Anxiety.  2.  Depression.  3.  Family history of bipolar disorder.  4.  Insomnia.    Plan: Recommend she change taking her venlafaxine to the morning instead of the evening.  Hopefully that helps with the insomnia issue.  Did refill the clonazepam for her to use as needed.  Do recommend that she get appointment with psychiatrist and she understands she has to see the counselor before there is a chance she will get psychiatry appointment.  Do schedule follow-up with Dr. Gibson who is going to be her primary care physician.  Slip written for the fact that she was not able to work on Sunday, July 30 due to her panic attack, and that depending on how things go she may have other times she is not able to work.  She notes her current employer is flexible and wants to work with her and she does like the job.  I did put on the work slip that her underlying issue is the anxiety/depression.  I explained that I did not want to raise the dose of the venlafaxine today because of the chance of having that trigger manic episode if she was bipolar.  She agrees with trying the venlafaxine in the morning instead of the evening.  She will continue to use the clonazepam only as needed and she notes that that does not really help her sleep well if she takes it in the evening anyhow.  Spent 25 minutes with least 50% in counseling.  She will call or return earlier as needed.  She understands she will need to sign a controlled substance agreement with Dr. Gibson if she stays on the clonazepam.    Subjective: 21-year-old female presenting for follow-up on anxiety and depression and see the note of Dr. Stephens from July 14.  She did see a counselor on July 24 at [facility] but states that she did not like the counselor and they did not recommend a follow-up and felt that it was not very helpful.  She is going to get scheduled with a different counselor that a friend of hers has used and has felt was good.   Her mother had bipolar disorder.  Tonya does have occasional caffeine but only one cup a day currently.  She notes in the past she used to have larger amounts and on the day she was hospitalized in June she had taken an energy drink.  She had used cocaine in the past but none since last November.  She had used marijuana in the past but none since June when she was hospitalized.  A number of medications in the past have not worked for her.  She does feel the venlafaxine is definitely helped with her anxiety but she has had more difficulty with sleeping.  She has been taking it in the evening.  She does a very good job of summarizing her overall difficulty.  She has not been using the clonazepam on a regular basis but does note that she did spell the bottle and she currently has about 16 tablets left.  She has that bottle here today.  She is going to get established with Dr. Gibson for her primary care as she and Dr. Stephens had talked about that.  She notes that she did get a new job recently.  On Sunday, July 30 when she was going to that job she had a panic attack and was not able to continue driving there.  She did call her employer.  But she does need a note for that day.  She states they are aware that she is dealing with anxiety and depression.  She works at [employer] and usually works Thursday through Sunday.  Past Medical History:   Diagnosis Date     Asplenia      Cocaine use      Fever and chills      Hereditary spherocytosis      Leukocytosis 7/18/2016     Pyelonephritis 11/15/2016     Sepsis, due to unspecified organism      UTI (urinary tract infection)      Allergies   Allergen Reactions     Amoxicillin Itching and Rash     Penicillins Itching and Rash     Current Outpatient Prescriptions   Medication Sig Dispense Refill     clonazePAM (KLONOPIN) 0.5 MG tablet Take 1 tablet (0.5 mg total) by mouth 2 (two) times a day. 60 tablet 0     venlafaxine (EFFEXOR-XR) 75 MG 24 hr capsule Take 1  "capsule (75 mg total) by mouth daily. 30 capsule 2     No current facility-administered medications for this visit.      Objective:/74  Pulse 82  Ht 5' 3.5\" (1.613 m)  Wt 129 lb (58.5 kg)  LMP 07/20/2017  Breastfeeding? No  BMI 22.49 kg/m2  HEENT is negative.  Neck supple.  Lungs clear.  Heart regular rate and rhythm without murmur.  Abdomen shows no masses tenderness or hepatosplenic B.  No pedal edema.  She is coherent and alert with clear speech.  "

## 2021-06-12 NOTE — PROGRESS NOTES
1. Encounter for Nexplanon removal     2. Need for vaccination  CANCELED: Influenza, Seasonal Quad, PF =/> 6months         ASSESSMENT/PLAN:     The following high BMI interventions were performed this visit: encouragement to exercise, weight monitoring, exercise promotion: stretching and exercise promotion: walking/step counting    1. Encounter for Nexplanon removal    Discussed importance of condom use, if she changes her mind about starting a different method of birth control, she will follow up in clinic  Patient was instructed to leave dressing in place for 24 hours and let steri strips fall off. May take Tylenol PRN for any mild discomfort.     2. Need for vaccination    Patient left the clinic before receiving her flu vaccine, attempted to reach her by phone x1      There are no Patient Instructions on file for this visit.  There are no discontinued medications.  Return if symptoms worsen or fail to improve, for follow up with PCP Adam if wanted to start another form of birth control.        Sheila Castro NP          SUBJECTIVE:  Tonya Thayer is a 24 y.o. female presents for removal of her Nexplanon device.  She has not had the Nexplanon in place for long, she has been experiencing notable headaches with mood swings, changes in the texture of her hair and weight gain.  She desires to have the device taken out today.  She is currently not sexually active, she does have a 2-year-old daughter at home.  She is not interested in starting any other forms of contraception at this time.  She will go back to using condoms as necessary.  I tried explaining the procedure to the patient, however, she declined hearing about the process due to her anxiety issues.  Site was prepped using 3 Betadine swabs.  3 cc of 1% lidocaine injection with epinephrine injected into the right upper extremity.  After several minutes, patient confirmed that the area was numb.  Small incision was made in the right upper  extremity with a #11 blade scalpel.  Device was located and removed without difficulty.  Patient tolerated procedure well.  Site was covered using 3 Steri-Strips, Kerlix wrap and Covan.  There was minimal bleeding noted post procedure. Device inspected and fully intact post removal.   Chief Complaint   Patient presents with     Procedure     Nexplanon removal -          Patient Active Problem List   Diagnosis     Hereditary Hemolytic Spherocytosis     Allergic Rhinitis     Asplenia     Elevated liver enzymes     Panic disorder     Thrombocytosis (H)     ZION (generalized anxiety disorder)     Nonintractable migraine     Moderate major depression (H)       Current Outpatient Medications   Medication Sig Dispense Refill     etonogestreL (NEXPLANON) 68 mg Impl implant 1 each by Subdermal route once. Lot: [id number]       LORazepam (ATIVAN) 1 MG tablet TAKE 1-2 TABLETS BY MOUTH TWICE DAILY AS NEEDED FOR ANXIETY 60 tablet 0     No current facility-administered medications for this visit.        Social History     Tobacco Use   Smoking Status Former Smoker     Years: 3.00     Quit date: 7/15/2020     Years since quittin.2   Smokeless Tobacco Never Used   Tobacco Comment    2 to 3 cigs a day.        REVIEW OF SYSTEMS: Denies fever/chills/sore throat/rhinorrhea/ear pain/swollen glands/shortness of breath/discomfort of chest/abdominal pain/changes in bowel habits/urinary symptoms/rash.      TOBACCO USE:  Social History     Tobacco Use   Smoking Status Former Smoker     Years: 3.00     Quit date: 7/15/2020     Years since quittin.2   Smokeless Tobacco Never Used   Tobacco Comment    2 to 3 cigs a day.      Social History     Socioeconomic History     Marital status: Single     Spouse name: Not on file     Number of children: Not on file     Years of education: Not on file     Highest education level: Not on file   Occupational History     Not on file   Social Needs     Financial resource strain: Not on file     Food  insecurity     Worry: Not on file     Inability: Not on file     Transportation needs     Medical: Not on file     Non-medical: Not on file   Tobacco Use     Smoking status: Former Smoker     Years: 3.00     Quit date: 7/15/2020     Years since quittin.2     Smokeless tobacco: Never Used     Tobacco comment: 2 to 3 cigs a day.    Substance and Sexual Activity     Alcohol use: Yes     Comment: 1 beer every few months      Drug use: No     Sexual activity: Yes     Partners: Male   Lifestyle     Physical activity     Days per week: Not on file     Minutes per session: Not on file     Stress: Not on file   Relationships     Social connections     Talks on phone: Not on file     Gets together: Not on file     Attends Religion service: Not on file     Active member of club or organization: Not on file     Attends meetings of clubs or organizations: Not on file     Relationship status: Not on file     Intimate partner violence     Fear of current or ex partner: Not on file     Emotionally abused: Not on file     Physically abused: Not on file     Forced sexual activity: Not on file   Other Topics Concern     Not on file   Social History Narrative    Lives at home with mother and brother.         OBJECTIVE:            Vitals:    10/05/20 1009   BP: 108/60   Pulse: 86   Resp: 16   Temp: 97.9  F (36.6  C)   SpO2: 97%     Weight: 162 lb 5 oz (73.6 kg)    Wt Readings from Last 3 Encounters:   10/05/20 162 lb 5 oz (73.6 kg)   20 155 lb (70.3 kg)   20 162 lb (73.5 kg)     Body mass index is 28.75 kg/m .        Physical Exam:  GENERAL APPEARANCE: A&A, NAD, well hydrated, well nourished  CV: RRR, no M/G/R   LUNGS: CTAB, normal respiratory effort  SKIN:  Normal skin turgor, no lesions/rashes, warm and dry, Nexplanon device right UE  PSYCHIATRIC;  Mood appropriate, memory intact, good eye contact, engaged in conversation, mild anxiety noted

## 2021-06-12 NOTE — PROGRESS NOTES
[facility] Well Child Check    ASSESSMENT & PLAN  Tonya Thayer is a 21 y.o. who has normal growth and normal development.    Diagnoses and all orders for this visit:    Routine infant or child health check    Female infertility  -     Testosterone, Total and Free  -     Dehydroepiandrosterone Sulfate(DHEA-S)  -     Thyroid Cascade    Leukocytosis  -     HM2(CBC w/o Differential)   Updating labs today    Contraception management  -     norgestimate-ethinyl estradiol (ORTHO TRI-CYCLEN LO, 28,) 0.18/0.215/0.25 mg-25 mcg Tab tablet; Take 1 tablet by mouth daily.  Dispense: 28 tablet; Refill: 11   Discussed risks and benefits of medication, she has been on this previously and done well, will restart.     Anxiety  -     venlafaxine (EFFEXOR-XR) 150 MG 24 hr capsule; Take 1 capsule (150 mg total) by mouth daily.  Dispense: 30 capsule; Refill: 2  -     LORazepam (ATIVAN) 1 MG tablet; Take 1 tablet (1 mg total) by mouth 2 (two) times a day as needed for anxiety.  Dispense: 60 tablet; Refill: 0   Not well controlled, will increase venlafaxine to 150 mg daily and have her follow up in one month. Given history will watch for signs of frank    Need for vaccination  -     HPV vaccine 9 valent 3 dose IM  -     Influenza, Seasonal,Quad Inj, 36+ MOS    Lipid screening  -     Lipid Cascade    Elevated liver enzymes  -     Comprehensive Metabolic Panel    Thrombocytosis   Likely secondary to her asplenia, will watch for now, is stable      Return to clinic in 1 year for a Well Child Check or sooner as needed    IMMUNIZATIONS/LABS  Immunizations were reviewed and orders were placed as appropriate.    REFERRALS  Dental:  Recommend routine dental care as appropriate.  Other:  No additional referrals were made at this time.    ANTICIPATORY GUIDANCE  Social:  Friends and Employment  Parenting:  Support, [place]/Dependence and Family Time  Nutrition:  Junk Food, Dieting and Body Image  Play and Communication:  Organized Sports,  Appropriate Use of TV and Read Books  Health:  Drugs, Smoking, Alcohol, Activity (>45 min/day), Sun Screen and Dental Care  Safety:  Seat Belts, Swimming Safety, Bike/Motorcycle Helmets and Outdoor Safety Avoiding Sun Exposure  Sexuality:  Body Changes, Safe Sex, STD's and Contraception    HEALTH HISTORY  Do you have any concerns that you'd like to discuss today?: Discuss panic attacks anxiety. Patient also mentions this pain that she has been having off and on that seems to go from her chest to her upper stomach.     She got home from being out and laid down, felt like she was getting hot and cold sweats. She took a pill, drank water and went to the ER. She then started feeling better so left. Besides the panic attack where she had to call the ambulance this was the worst one that she had. She has been cutting down her lorazepam as much as possible, she is wondering about this being what happened. She isn't interested or in the mood. It has been months, she is now getting a bit annoyed about this. She is wondering if there is something that is contributing to this. If it is due to the medicine. Up until yesterday she is doing well. She had been anxious here and there. She hasn't had a panic attack for quite some time that she couldn't control. She will be seeing a counselor in a few weeks at [facility].     She had some pain off and on in her chest as well. [place]. It is sharp at times, and will come down into her upper stomach and if she deep breathes it will go away. She hasn't had any obvious heartburn with this. For the last few weeks they have been eating a bit worse than they had been, they were moving out and getting organized. Last night they did get groceries and made dinner.     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     She is hoping to get on the pill. She has done well with it in the past.       Refills needed? Yes  Lorazepam and Effexor    Do you have any forms that need to be filled out? No        Do you have any significant health concerns in your family history?: No  Family History   Problem Relation Age of Onset     Clotting disorder Brother      Hereditary Spherocytosis     Since your last visit, have there been any major changes in your family, such as a move, job change, separation, divorce, or death in the family?: No    Home  Who lives in your home?:  Patient lives with her boyfriend, her mother and her younger brother.   Social History     Social History Narrative    Lives at home with mother and brother.     Do you have any trouble with sleep?:  Yes: patient does have troubles with falling asleep.     Education  What school does your child attend?: N/A - Graduated.   What grade is your child in?:  N/A  How does the patient perform in school (grades, behavior, attention, homework?: N/A     Eating  Does patient eat regular meals including fruits and vegetables?:  yes  What is the patient drinking (cow's milk, water, soda, juice, sports drinks, energy drinks, etc)?: water and soda  Does patient have concerns about body or appearance?:  No    Activities  Does the patient have friends?:  yes  Does the patient get at least one hour of physical activity per day?:  yes  Does the patient have less than 2 hours of screen time per day (aside from homework)?:  yes  What does your child do for exercise?: Patient enjoys going on nightly walks.  Does the patient have interest/participate in community activities/volunteers/school sports?:  no    MENTAL HEALTH SCREENING  PHQ-2 Total Score: 2 (9/13/2017  1:00 PM)  Depression Follow-up Plan: patient follow-up to return when and if necessary (9/13/2017  1:00 PM)  PHQ-9 Total Score: 14 (9/13/2017  1:00 PM)    VISION/HEARING  Vision: Not done: Not needed due to age.  Hearing:  Not done: Not needed due to age.    No exam data present    TB Risk Assessment:  The patient and/or  "parent/guardian answer positive to:  patient and/or parent/guardian answer 'no' to all screening TB questions    Dental  Is your child being seen by a dentist?  Yes  Flour[employer] Varnish Application Screening  Is child seen by dentist?     Yes    Patient Active Problem List   Diagnosis     Hereditary Hemolytic Spherocytosis     Allergic Rhinitis     Leukocytosis     Asplenia     Cocaine use     RLQ abdominal pain     Elevated liver enzymes     Nicotine dependence     Panic disorder     Anxiety     Thrombocytosis       Drugs  Does the patient use tobacco/alcohol/drugs?: yes, patient has one beer every few months and smokes weed daily. Recently this has decreased with the panic attacks.     Safety  Does the patient have any safety concerns (peer or home)?:  no  Does the patient use safety belts, helmets and other safety equipment?:  yes    Sex  Is the patient sexually active?:  yes, would like to discuss. Patient feels a decrease in her sexually life.     MEASUREMENTS  Height:  5' 3\" (1.6 m)  Weight: 133 lb 4.8 oz (60.5 kg)  BMI: Body mass index is 23.61 kg/(m^2).  Blood Pressure: 100/64  Blood pressure percentiles are 23 % systolic and 56 % diastolic based on NHBPEP's 4th Report. Blood pressure percentile targets: 90: 121/76, 95: 125/80, 99 + 5 mmH/93.    PHYSICAL EXAM  Physical Exam   Gen: Well developed, well nourished, no acute distress.  HEENT: normocephalic/atraumatic, PERRLA/EOMI, TMs: Gray, normal light reflex, no nasal discharge.  Oral mucosa: no erythema/exudate  Neck: No LAD/masses/thyromegaly  Lungs: clear bilaterally  Heart: regular rate and rhythm, no murmurs/gallops/rubs  Breasts: symmetric, no masses/skin changes, nipple discharge, or axillary LAD.  BSE reviewed.  Abdomen: Normal bowel sounds, soft, non-tender, non-distended, no masses, neg Russell's/McBurney's, no rebound/guarding  Genital: Deferred, no complaints  Lymphatics: no supraclavicular/axillary/epitrochlear/inguinal LAD. No edema.  Neuro: " A&O x 3, CN II-XII intact, strength 5/5, reflexes symmetric, sensory intact to light touch.  Psych: Behavior anxious, she is well dressed and groomed  Musculoskeletal: no gross deformities.  Skin: no rashes or lesions.

## 2021-06-13 NOTE — PROGRESS NOTES
SUBJECTIVE: Tonya Thayer is a 21 y.o.  female who presents today to follow-up on her anxiety and panic.  She has previously been on clonazepam 0.5 mg twice daily.  She is currently on venlafaxine extended release 150 mg daily that Dr. Stephens started her on.  She feels it is helping.  She is no longer on the clonazepam but does at times use Ativan when she is having panic attacks.  Apparently she has family history for anxiety as her mom is anxious as are her sister and brother.  She describes having chest pain with 1 of her panic attacks.  She also gets migraines at times but not frequently.  Recently she did get sick and somehow that triggered a migraine.  She has been feeling wiped out and she rested for quite a few days and went back to work today and was on her feet.  She eventually had to leave early because she became symptomatic.  She needs a note for work.  We discussed the use of sumatriptan for when she feels a migraine coming on.  We discussed she should do it as soon as possible.  We also discussed that her panic attacks seem to be very autonomic in that a little anxiety causes a big response and then that triggers a panic attack.  I suggested that she actually try a daily low dose atenolol.  She could even take a second dose if she was particularly anxious.  She would like to try this as she prefers not to be doing controlled substances.  I concur.    OBJECTIVE: /66  Pulse 66  Wt 144 lb (65.3 kg)  BMI 24.72 kg/m2  General: Healthy and fit appearing teenager in no acute distress currently  Heart: Regular rate and rhythm without murmur  Lungs: Clear bilaterally  Abdomen: Soft  Extremities: Warm, dry and without edema  Psych: Mood good currently, does not appear anxious during visit    ASSESSMENT & PLAN:    1. ZION (generalized anxiety disorder)  atenolol (TENORMIN) 25 MG tablet   2. Panic disorder     3. Nonintractable migraine  SUMAtriptan (IMITREX) 25 MG tablet     She is  going to try a daily 25 mg atenolol tab to augment her use of her venlafaxine.  I gave her some low-dose sumatriptan which she can take if she feels a migraine coming on.  She was given a note for work.  She should follow-up in no longer than 6 months time.    Patient Active Problem List   Diagnosis     Hereditary Hemolytic Spherocytosis     Allergic Rhinitis     Asplenia     Cocaine use     Elevated liver enzymes     Panic disorder     Anxiety     Thrombocytosis     ZION (generalized anxiety disorder)     Nonintractable migraine       Current Outpatient Prescriptions on File Prior to Visit   Medication Sig Dispense Refill     LORazepam (ATIVAN) 1 MG tablet Take 1 tablet (1 mg total) by mouth 2 (two) times a day as needed for anxiety. 60 tablet 0     norgestimate-ethinyl estradiol (ORTHO TRI-CYCLEN LO, 28,) 0.18/0.215/0.25 mg-25 mcg Tab tablet Take 1 tablet by mouth daily. 28 tablet 11     venlafaxine (EFFEXOR-XR) 150 MG 24 hr capsule Take 1 capsule (150 mg total) by mouth daily. 30 capsule 2     No current facility-administered medications on file prior to visit.

## 2021-06-13 NOTE — TELEPHONE ENCOUNTER
Please call patient, just wanted to make sure that she found out that her covid test was positive.     In addition to this,  recommended a follow up with me in a few weeks. I am happy to do this after her 10 day quarantine period in clinic if she would like otherwise we can look at getting you in sooner virtually if you want.

## 2021-06-13 NOTE — TELEPHONE ENCOUNTER
Call from [facility] testing site asking for symptomatic order.   Order placed by Dr Lehman on behalf of Dr Medina who is off today.

## 2021-06-13 NOTE — PATIENT INSTRUCTIONS - HE
Prophylaxis- start these before becoming ill with COVID19:  Zinc 50mg daily   Vitamin C 500mg twice daily  Vitamin D 6638-0169 units daily  Vitamin B complex  Melatonin (slow release): Begin with 0.3mg and increase as tolerated to 2mg at bedtime    Treatment for home management of COVID19 illness:  Zinc 100mg daily  Vitamin C 500mg twice daily  Vitamin D3 8899-8825 units daily  Vitamin B complex  Melatonin 6-10mg at bedtime   Aspirin 81mg daily to prevent clots    Other tips if sick with COVID19:  Monitor pulse ox at home if able (can get an nicole on your phone). If your oxygen level is less than 94%, this would prompt visit to the ER.  Prone position (sleep or rest on your belly) to improve O2 sats  Fluid hydration  Rest  Be outside if weather permits (masked of course)      Get Well Loop  We know it can be scary to hear that you might have COVID-19. So our team can help track your symptoms and make sure you are doing ok over the next two weeks, we use a program called [employer] to keep in touch. When you receive an email from [employer], please consider enrolling in our monitoring program. There is no cost to you for monitoring.     Here is a URL where you can learn more:  [email or web address]

## 2021-06-13 NOTE — TELEPHONE ENCOUNTER
Left message to call back for: patient  Information to relay to patient:  See below and ask if she'd like an in clinic appt or virtual.

## 2021-06-13 NOTE — TELEPHONE ENCOUNTER
Left message to call back for: patient  Information to relay to patient:  See below.     Patient was notified of test results via [employer]. I'm trying to call her on the phone for scheduling may be easier due to multiple choices in appointment times..

## 2021-06-13 NOTE — TELEPHONE ENCOUNTER
----- Message from Michelle Aguila MD sent at 12/4/2020  4:07 PM CST -----  JOSE- wondering if you might be able to fit her in 1-2 weeks for a follow up in case her anxiety isn't improving? Right now she is coping but she thought it would be good to maybe see you in a few weeks or sooner if possible.

## 2021-06-13 NOTE — PROGRESS NOTES
"Tonya Thayer is a 25 y.o. female who is being evaluated via a billable video visit.      The patient has been notified of following:     \"This video visit will be conducted via a call between you and your physician/provider. We have found that certain health care needs can be provided without the need for an in-person physical exam.  This service lets us provide the care you need with a video conversation.  If a prescription is necessary we can send it directly to your pharmacy.  If lab work is needed we can place an order for that and you can then stop by our lab to have the test done at a later time.    Video visits are billed at different rates depending on your insurance coverage. Please reach out to your insurance provider with any questions.    If during the course of the call the physician/provider feels a video visit is not appropriate, you will not be charged for this service.\"    Patient has given verbal consent to a Video visit? Yes  How would you like to obtain your AVS? AVS Preference: MyChart.  If dropped by the video visit, the video invitation should be sent to: Text to cell phone: [phone]  Will anyone else be joining your video visit? No          "

## 2021-06-13 NOTE — PROGRESS NOTES
Assessment:  1.  Acute sinusitis and cervical adenitis.    Plan: Azithromycin 250 mg-#6-2 p.o. today and then 1 p.o. on days 2 through 5.  Symptomatic care.  Use Afrin nasal spray-1-2 squirts in each nostril twice daily for up to 3 days but then specifically explained she cannot use it any longer than that because of the risk of rebound nasal congestion etc.  Can use normal saline no spray on regular basis for moisturizing.  Symptomatic care.  Slip written for no work today and possibly through the end of the week depending on how she is doing but specifically explained that if she is not able to get back to work by next Monday she would need to let us know.  Explained that if she is having improvement in headache she can return to work certainly sooner.  She understands and agrees.  He does not have any young children at home and we elected to not do rapid strep test today given that she looked like she needed treatment whether or not that was positive.    Subjective: 21-year-old female presenting for evaluation of illness of several days duration.  She missed work for the first time today.  She notes she has had worsening of nasal congestion and pain in her cheek and forehead area.  Was not able to sleep last night.  Also feeling swollen glands and sore throat.  Feels tightness in her chest.  Past Medical History:   Diagnosis Date     Asplenia      Cocaine use      Fever and chills      Hereditary spherocytosis      Leukocytosis 7/18/2016     Pyelonephritis 11/15/2016     Sepsis, due to unspecified organism      UTI (urinary tract infection)      Allergies   Allergen Reactions     Amoxicillin Itching and Rash     Penicillins Itching and Rash     Is on the venlafaxine 150 mg once a day and the other meds as listed.    All other review of systems are currently negative.  She notes she only used cocaine one time last November and has never used it again since.  Objective:/70  Pulse 78  Temp 98.5  F (36.9  C)  "(Oral)   Resp 14  Ht 5' 4\" (1.626 m)  Wt 138 lb (62.6 kg)  SpO2 99%  BMI 23.69 kg/m2  Head normocephalic.  External ears and TMs normal.  Significant nasal congestion.  Tenderness to the cheeks and forehead bilateral to palpation.  Oropharynx is erythema but no exudate.  Bilateral tender anterior cervical nodes are enlarged.  Lungs are clear to auscultation with no wheezes rales or rhonchi.  Heart regular rate and rhythm without murmur.  Abdomen shows no masses tenderness or hepatosplenomegaly.  No pedal edema.  "

## 2021-06-13 NOTE — TELEPHONE ENCOUNTER
"This pt presented to the [facility] today for a scheduled COVID test. The team was unable to find order on Epic on either [facility] or [facility]. The team asked me to check [facility] for the orders. I found 4 separate encounter that were incomplete from today. It appears that she answered \"yes\" to an initial question about a serious medical condition. The instructions said she should be seen in person and to call for an appt.     I suspect the  heard her say she called [facility] and assumed that meant she needed to be scheduled for a COVID test. Schedulers do not have access to confirm orders.     I contacted this patient's PCP team and explained the situation, requesting a symptomatic COVID order. I also reported that she is not in distress. She had a recent exposure to someone that has tested positive. She is mildly congested in her sinuses.     Obtained order from Dr. Ilsa Lehman at the  Clinic, as she is covering for this pt's PCP. Also reviewed some recent notes in chart that this pt was overdue for labs. Found future orders in chart and lab went to collect them.     Pt understands what to do if her symptoms worsen, and to isolate until she learns results or otherwise advised by her care team.     Pt advised to f/u with her PCP/Clinic.     America Zelaya RN          "

## 2021-06-13 NOTE — PROGRESS NOTES
VIDEO VISIT  Due to current COVID19 pandemic, pt was offered virtual visit in lieu of in office evaluation to limit exposures.      Assessment/plan  Tonya Thayer is a 25 y.o. female who is a patient of Michelle Medina MD.    Exposure to COVID-19 virus  Suspected 2019 novel coronavirus infection  Based on clinical hx in setting of high community spread of COVID19, we reviewed current Dx is presumed viral URI s/t COVID19 (vs other viral etiology). Also reviewed alternative causes for cough or shortness of breath which can be serious and require more evaluation if not improving or worsening. DDx would include PNA, PE, PTX, cardiac etiology, etc. Pt understands strict return policies for urgent evaluation.   - COVID testing in process & isolation precautions reviewed.   - Reviewed due to high suspicion for COVID19 (even if  a negative test result), or if test is positive, then would remain out of work/public to isolate for 10 days from when symptoms started AND 24 hours after fever resolves (without fever reducing medications) AND improvement of respiratory symptoms (whichever is longer).   - Will place referral to get well loop   - [facility] decision tree reviewed regarding pertinent family situations/quarantine.  - Symptomatic cares for viral illness reviewed and encouraged home oxygen monitoring via nicole on phone    Given her hx of asplenia and thrombocytosis she would be a reasonable candidate to start aspirin 81mg daily at this time. She has never had a clot before.  Her labs from 12/3 were were reviewed and stable with normal WBC, Hb and plts stable at 560 range. Iron studies also normal.  - COVID-19 GetWell Loop Referral    ZION (generalized anxiety disorder)  Declines need for change in her medication right now; reviewed mindfulness and other ways to get virtual support/socialization during this isolation time    Asplenia  Given her hx of asplenia and thrombocytosis she would be a reasonable candidate  to start aspirin 81mg daily at this time. She has never had a clot before.  Her labs from 12/3 were were reviewed and stable with normal WBC, Hb and plts stable at 560 range. Iron studies also normal.          COVID19 precautions reviewed, questions answered. Referred to [facility] for latest updates.     Follow up: if worsening    Michelle Aguila MD    Video-Visit Details- see CA note for consent  Video visit start time: 3:55pm  Video visit end time: 4:07pm  Total time: 12 min  Originating Location (pt. Location): Home  Distant Location (provider location):  [facility]   Mode of Communication:  Video Conference via ThetaRay         Subjective:      HPI: Tonya Thayer is a 25 y.o. female who is being evaluated via a billable video visit due to COVID19 pandemic precautions.    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       COVID questions: Family friend tested positive for COVID on MOnday or Tuesday. Tonya had been with her from [facility] through the weekend here.     Had headache, diarrhea for a few days, nausea started Saturday. Then Monday or Tuesday had congestion and loss of taste or smell. She reports NO taste or smell.   Fatigued. Nauseated often. Vomited  Mild cough.  Feels a little winded sometimes but no significant shortness of breath outside of her anxiety which isn't helping at all.     She has a 2 year old in the house. Tonya is  from her family right now though. Her anxiety is pretty bad but declines adam for changing up meds.    Review of Systems:  + fevers, cough, congestion, nausea, vomiting x 1  Loss of taste and smell  No rash  12 point comprehensive review of systems was negative except as noted and HPI     Social History:  Social History     Social History Narrative    Lives at home with mother and brother.       Medical History:   I have  reviewed and updated the patient's Past Medical History, Social History, Family History and Medication List.    Medications:  Current Outpatient Medications   Medication Sig Dispense Refill     LORazepam (ATIVAN) 1 MG tablet TAKE 1-2 TABLETS BY MOUTH TWICE DAILY AS NEEDED FOR ANXIETY 60 tablet 0     No current facility-administered medications for this visit.        Imaging & Labs reviewed this visit:   Lab Results   Component Value Date    WBC 7.8 12/03/2020    WBC 8.1 12/03/2020    HGB 13.2 12/03/2020    HGB 13.7 12/03/2020    HCT 37.3 12/03/2020    HCT 39.6 12/03/2020    MCV 87 12/03/2020    MCV 90 12/03/2020     (H) 12/03/2020     (H) 12/03/2020       No results found for: HGBA1C  Lab Results   Component Value Date    TSH 0.54 10/16/2019     Lab Results   Component Value Date    CREATININE 0.60 01/14/2020     Lab Results   Component Value Date    K 4.3 01/14/2020         Objective:             Physical Exam:     General: Appears mildly ill- resting in bed at her house, room is pretty dark  HEET: normocephalic conjunctivae are clear  Neck: supple   Lungs: Normal respiratory effort. No audible wheezing.  Intermittent dry cough noted  Heart: No visible JVD, no visible LE swelling  Skin: clear without rash or lesions  Neuro: alert    Michelle Aguila MD      \

## 2021-06-13 NOTE — PROGRESS NOTES
ASSESSMENT/PLAN:       1. ZION (generalized anxiety disorder)  -Will restart her on Lexapro, low dose given her prior sensitivity to medications. Will continue on the propranolol at this dose for now and once her anxiety is feeling better she will start to wean down to 1, 1 and 2 for her dosing.   - escitalopram oxalate (LEXAPRO) 5 MG tablet; Take 1 tablet (5 mg total) by mouth daily.  Dispense: 30 tablet; Refill: 2  - propranoloL (INDERAL) 10 MG tablet; Take 2 tablets (20 mg total) by mouth 2 (two) times a day. At lunch time  Dispense: 150 tablet; Refill: 1  - LORazepam (ATIVAN) 1 MG tablet; TAKE 1-2 TABLETS BY MOUTH TWICE DAILY AS NEEDED FOR ANXIETY  Dispense: 60 tablet; Refill: 0    2. Palpitations  -Likely secondary to her recent covid and her anxiety but given her persistent issues will refer for event monitoring for further evaluation as well as lab as listed below  - JES Monitor Hook-Up; Future  - Comprehensive Metabolic Panel  - Thyroid Cascade  - Magnesium    3. Thrombocythemia (H)  -Will refer to hematology for further evaluation, guidance on if she needs anticoagulation but this is all likely due to her h/o splenectomy  - Ambulatory referral to Oncology/Hematology Adult  - HM1(CBC and Differential)    4. Hereditary Hemolytic Spherocytosis      Return in about 3 months (around 3/18/2021) for recheck.    Michelle Medina MD      PROGRESS NOTE   12/18/2020    SUBJECTIVE:  Tonya Thayer is a 25 y.o. female  who presents for follow up.     She notes that she is here today to discuss restarting Lexapro.     She will wake up dailly and will have her heart racing. She did go to the ER and her pulse was 170. She is still not sleeping, waking up around 0300 and then will wake up when her daughter goes to  around 0800. She is not tired at night and cannot go to sleep but does feel really really tired.     For the propranolol she is doing 2 in the morning, one in the afternoon. And two at night  still. She does think that this is helping her manage her anxiety and her heart rate.     She does have fast heart rate as well. She will note a heart rate of 120 at times with anxiety.     She continues to have elevated platelets as well.   Chief Complaint   Patient presents with     Follow-up     med check, DX 20 COVID, med discussion on Lexapro         Patient Active Problem List   Diagnosis     Hereditary Hemolytic Spherocytosis     Allergic Rhinitis     Asplenia     Elevated liver enzymes     Panic disorder     Thrombocytosis (H)     ZION (generalized anxiety disorder)     Nonintractable migraine     Moderate major depression (H)       Current Outpatient Medications   Medication Sig Dispense Refill     LORazepam (ATIVAN) 1 MG tablet TAKE 1-2 TABLETS BY MOUTH TWICE DAILY AS NEEDED FOR ANXIETY 60 tablet 0     propranoloL (INDERAL) 10 MG tablet Take 2 tablets (20 mg total) by mouth 2 (two) times a day. At lunch time 150 tablet 1     escitalopram oxalate (LEXAPRO) 5 MG tablet Take 1 tablet (5 mg total) by mouth daily. 30 tablet 2     No current facility-administered medications for this visit.        Social History     Tobacco Use   Smoking Status Former Smoker     Years: 3.00     Quit date: 7/15/2020     Years since quittin.4   Smokeless Tobacco Never Used   Tobacco Comment    2 to 3 cigs a day.            OBJECTIVE:        Recent Results (from the past 240 hour(s))   Comprehensive Metabolic Panel   Result Value Ref Range    Sodium 139 136 - 145 mmol/L    Potassium 4.5 3.5 - 5.0 mmol/L    Chloride 104 98 - 107 mmol/L    CO2 25 22 - 31 mmol/L    Anion Gap, Calculation 10 5 - 18 mmol/L    Glucose 80 70 - 125 mg/dL    BUN 9 8 - 22 mg/dL    Creatinine 0.61 0.60 - 1.10 mg/dL    GFR MDRD Af Amer >60 >60 mL/min/1.73m2    GFR MDRD Non Af Amer >60 >60 mL/min/1.73m2    Bilirubin, Total 1.2 (H) 0.0 - 1.0 mg/dL    Calcium 9.1 8.5 - 10.5 mg/dL    Protein, Total 7.6 6.0 - 8.0 g/dL    Albumin 4.1 3.5 - 5.0 g/dL     "Alkaline Phosphatase 51 45 - 120 U/L    AST 13 0 - 40 U/L    ALT 9 0 - 45 U/L   Thyroid Cascade   Result Value Ref Range    TSH 0.86 0.30 - 5.00 uIU/mL   Magnesium   Result Value Ref Range    Magnesium 2.0 1.8 - 2.6 mg/dL   HM1 (CBC with Diff)   Result Value Ref Range    WBC 9.3 4.0 - 11.0 thou/uL    RBC 4.43 3.80 - 5.40 mill/uL    Hemoglobin 13.7 12.0 - 16.0 g/dL    Hematocrit 40.0 35.0 - 47.0 %    MCV 90 80 - 100 fL    MCH 31.0 27.0 - 34.0 pg    MCHC 34.3 32.0 - 36.0 g/dL    RDW 11.4 11.0 - 14.5 %    Platelets 606 (H) 140 - 440 thou/uL    MPV 6.7 (L) 7.0 - 10.0 fL    Neutrophils % 68 50 - 70 %    Lymphocytes % 23 20 - 40 %    Monocytes % 8 2 - 10 %    Eosinophils % 1 0 - 6 %    Basophils % 1 0 - 2 %    Neutrophils Absolute 6.4 2.0 - 7.7 thou/uL    Lymphocytes Absolute 2.1 0.8 - 4.4 thou/uL    Monocytes Absolute 0.7 0.0 - 0.9 thou/uL    Eosinophils Absolute 0.1 0.0 - 0.4 thou/uL    Basophils Absolute 0.1 0.0 - 0.2 thou/uL       Vitals:    12/18/20 1333   BP: 108/70   Pulse: 79   Resp: 16   SpO2: 98%   Weight: 167 lb (75.8 kg)   Height: 5' 5\" (1.651 m)     Weight: 167 lb (75.8 kg)          Physical Exam:  GENERAL APPEARANCE: A&A, NAD, well hydrated, well nourished  SKIN:  Normal skin turgor, no lesions/rashes   HEENT: moist mucous membranes, no rhinorrhea  NECK: Normal  CV: fast heart rate but regular, no M/G/R   LUNGS: CTAB  EXTREMITY: no edema   NEURO: no gross deficits   PSYCH: Affect is appropriate, well dressed and groomed, normal thought process, speech pattern, no tearfulness today.     "

## 2021-06-13 NOTE — TELEPHONE ENCOUNTER
Coronavirus (COVID-19) Notification     Reason for call  Notify of POSITIVE  COVID-19 lab result, assess symptoms,  review [facility] recommendations     Lab Result   Lab test for 2019-nCoV rRt-PCR or SARS-COV-2 PCR  Oropharyngeal AND/OR nasopharyngeal swabs were POSITIVE for 2019-nCoV RNA [OR] SARS-COV-2 RNA (COVID-19) RNA      We have been unable to reach Patient by phone at this time to notify of their Positive COVID-19 result.  Left voicemail message requesting a call back to [phone] [facility] for results.        POSITIVE COVID-19 Letter sent: LASHAWN Elder RN/[facility] Nurse Advisors

## 2021-06-14 NOTE — PROGRESS NOTES
Attempted to call patient for video visit. LM for return call. 20 min later, attempted to call her again, no answer. Megan Pan

## 2021-06-14 NOTE — PROGRESS NOTES
The additional blood work including BCR/ABL, JAK2, CALR, MPL results were reviewed with the patient over the phone.  All came back negative.  It is most likely that her thrombocytosis is secondary to postsplenectomy state and expected slight fluctuations with reactive changes.  No concern for primary bone marrow disease at this point.    She is okay not to mail the result, but be available to her primary care doctor.    Follow-up with hematology as needed.

## 2021-06-14 NOTE — PROGRESS NOTES
SUBJECTIVE: Tonya Thayer is a 22 y.o. White or  female who presents today for follow-up of her anxiety.  She is currently on venlafaxine 150 mg which she takes at night and 75 mg which she takes in the morning.  This was started on 11/13 when I saw her last.  She was having a lot of anxiety, somewhat around her mom's health.  She thinks that her anxiety is improved with the increase of medication however her mom again is in [facility].  She went in on Thursday for her pulmonary situation.  Today she was given a ZION 7 and scored 12.  She complains that she is not sleeping when well.  She is working from 11-4 goes to bed at 10 but wakes up early.  We discussed it might be worth trying to switch her doses of Effexor to see if that is of benefit.  She does have lorazepam which she takes a half of a 1 mg tablet if she is having panic or if she really needs to sleep.  She notes that she really is not taking nearly as much as she used to.  She only takes it once a day at most.  When I saw her last I sent her to [facility] because of some right hand symptoms.  They thought perhaps she was having tendinitis and carpal tunnel syndrome and she was given splints to use at night which has helped the situation.  We also discussed that because she has hereditary hemolytic spherocytosis she needs to have a vaccination today.  She is willing to do this.    OBJECTIVE: /60  Pulse 72  Wt 144 lb 6.4 oz (65.5 kg)  BMI 24.79 kg/m2  General: Healthy and fit appearing young female in no acute distress  Heart: Regular rate and rhythm  Lungs: Clear bilaterally  Abdomen: Soft  Psych: Mood appears normal    ASSESSMENT & PLAN:    1. ZION (generalized anxiety disorder)     2. Panic disorder     3. Hereditary Hemolytic Spherocytosis  Pneumococcal conjugate vaccine 13-valent 6wks-17yrs; >50yrs   4. Wrist pain       She will try to switch her dosing of her Effexor in order to see if her sleep improves.  At this point she  has not been on it long enough to know if she needs to have it augmented.  She is at top dosage currently.  We will hold off on any more treatment.  She is pursuing therapy.  She will use her Ativan as sparingly as possible.  She was given the Prevnar 13 vaccine today and we discussed she needs to have her pneumococcal 23 vaccine in 8 or more weeks.  She will follow-up with me in 2-3 months time.    Patient Active Problem List   Diagnosis     Hereditary Hemolytic Spherocytosis     Allergic Rhinitis     Asplenia     Cocaine use     Elevated liver enzymes     Panic disorder     Anxiety     Thrombocytosis     ZION (generalized anxiety disorder)     Nonintractable migraine       Current Outpatient Prescriptions on File Prior to Visit   Medication Sig Dispense Refill     atenolol (TENORMIN) 25 MG tablet Take 1 tab daily for anxiety, may repeat if needed once 90 tablet 1     norgestimate-ethinyl estradiol (ORTHO TRI-CYCLEN LO, 28,) 0.18/0.215/0.25 mg-25 mcg Tab tablet Take 1 tablet by mouth daily. 28 tablet 11     SUMAtriptan (IMITREX) 25 MG tablet Take 1 tablet (25 mg total) by mouth every 2 (two) hours as needed for migraine (up to 3 pills in 24 hrs). 12 tablet 0     venlafaxine (EFFEXOR-XR) 150 MG 24 hr capsule Take 1 capsule (150 mg total) by mouth daily. 90 capsule 1     venlafaxine (EFFEXOR-XR) 75 MG 24 hr capsule Take 1 capsule (75 mg total) by mouth daily. 90 capsule 0     No current facility-administered medications on file prior to visit.

## 2021-06-14 NOTE — TELEPHONE ENCOUNTER
Last Med Check: 1/6/21    Next med check due on: 2/2/21    CSA on File: no    Future Appointment Scheduled ? yes    Last Med Refill? 12/18/20    Kasey Michel, CMA

## 2021-06-14 NOTE — PROGRESS NOTES
Patient here today for initial consultation with Dr. Ozuna for benign hematology/NADINE Mendoza RN

## 2021-06-14 NOTE — PATIENT INSTRUCTIONS - HE
I would recommend testing for strep throat and a chest xray today.  We will let you know the results of these tests later today and would treat accordingly.  If the xray is normal, I would be very reassured that your persistent symptoms are simply related to the COVID-19 infection you had in early December. For some people unfortunately, symptoms following COVID-19 infection can linger for several months.  I would certainly recommend a reassessment if you notice worsening symptoms, but otherwise would expect that the general trend, albeit slow, would be one of improvement.    As we had discussed today, I think it would be reasonable for you to increase your dose of lexapro to 10mg daily.  I sent in a new prescription for this to your [facility] pharmacy on [address] in [place].  Please schedule a follow-up visit with Dr Medina to review your anxiety symptoms in 5-6 weeks.

## 2021-06-14 NOTE — TELEPHONE ENCOUNTER
Controlled Substance Refill Request  Medication Name:   Requested Prescriptions     Pending Prescriptions Disp Refills     LORazepam (ATIVAN) 1 MG tablet 60 tablet 0     Sig: TAKE 1-2 TABLETS BY MOUTH TWICE DAILY AS NEEDED FOR ANXIETY     Date Last Fill: 12/18/20  [facility]ed [facility]acy: [facility]  Submit electronically to pharmacy  Controlled Substance Agreement on file:   Encounter-Level CSA Scan Date - 01/24/2018:    Scan on 1/26/2018 10:23 AM: HE        Last office visit:  1/6/21  Libia Sinha RN, MA  [facility]    Triage Nurse Advisor

## 2021-06-14 NOTE — TELEPHONE ENCOUNTER
Not all the results are back. Nothing concerning at this point. I don't know what her questions is. The name of the test or result?

## 2021-06-14 NOTE — PROGRESS NOTES
SUBJECTIVE: Tonya Thayer is a 22 y.o. Caucasion female who presents today to follow-up on her anxiety.  At the last visit we continued with her venlafaxine 150 mg and added on atenolol to be taken daily with an additional dose as needed in the evening.  She says since starting the atenolol her anxiety symptoms have decreased in severity and frequency.  She is taking at the first tablets in the morning and if needing a second tablet in the evening will cut it.  She does wonder however if there is any correlation to starting the atenolol and a sensation of intermittent numbness in her right hand primarily in her pinky and ring finger that radiates from her elbow. She denies trauma, other neuro deficits, weakness fatigue, vision changes, confusion.     Tonya does have an appointment with a therapist coming up at the end of December which is good as she currently has increased life stressors with her mother currently being in the hospital.  She continues to use Ativan on an as-needed basis and is trying to use it sparingly.    OBJECTIVE: /62  Pulse 84  Wt 139 lb 3.2 oz (63.1 kg)  BMI 23.89 kg/m2  General: Well appearing adult female in no acute distress  Heart: S1, S2. RRR. No murmur  Lungs: Clear to auscultation bilaterally  Abdomen: Soft and nontender.  Bowel sounds active in all 4 quadrants  Extremities: No lower extremity edema  Psychiatric: Makes appropriate eye contact.  Dressed appropriately.  Thoughts clear and linear.    ASSESSMENT & PLAN:    1. ZION (generalized anxiety disorder)     2. Anxiety  venlafaxine (EFFEXOR-XR) 150 MG 24 hr capsule    venlafaxine (EFFEXOR-XR) 75 MG 24 hr capsule    DISCONTINUED: venlafaxine (EFFEXOR-XR) 75 MG 24 hr capsule   3. Panic disorder     4. Numbness and tingling in right hand  Ambulatory referral to Orthopedic Surgery   5. Preventative health care  HPV vaccine 9 valent 3 dose IM     Increase Effexor to using 150 mg +75 mg daily.  We discussed that she can try  splitting these in the morning and evening or taking them all at one time.  Next HPV vaccine given today.  For the numbness we discussed a probable ulnar nerve impingement, and the patient would like to be evaluated by orthopedics for a second opinion and potential intervention. She is to follow up in two months preferably but no longer than six months time.    Patient Active Problem List   Diagnosis     Hereditary Hemolytic Spherocytosis     Allergic Rhinitis     Asplenia     Cocaine use     Elevated liver enzymes     Panic disorder     Anxiety     Thrombocytosis     ZION (generalized anxiety disorder)     Nonintractable migraine       Current Outpatient Prescriptions on File Prior to Visit   Medication Sig Dispense Refill     atenolol (TENORMIN) 25 MG tablet Take 1 tab daily for anxiety, may repeat if needed once 90 tablet 1     LORazepam (ATIVAN) 1 MG tablet Take 1 tablet (1 mg total) by mouth 2 (two) times a day as needed for anxiety. 60 tablet 0     norgestimate-ethinyl estradiol (ORTHO TRI-CYCLEN LO, 28,) 0.18/0.215/0.25 mg-25 mcg Tab tablet Take 1 tablet by mouth daily. 28 tablet 11     SUMAtriptan (IMITREX) 25 MG tablet Take 1 tablet (25 mg total) by mouth every 2 (two) hours as needed for migraine (up to 3 pills in 24 hrs). 12 tablet 0     [DISCONTINUED] venlafaxine (EFFEXOR-XR) 150 MG 24 hr capsule Take 1 capsule (150 mg total) by mouth daily. 30 capsule 2     No current facility-administered medications on file prior to visit.                Erica Gibson MD ([place])

## 2021-06-14 NOTE — PROGRESS NOTES
[facility] Hematology and Oncology Consult Note    Patient: Tonya Thayer  MRN: [id number]  Date of Service: 01/14/2021      Reason for Visit    Chief Complaint   Patient presents with     Benign Hematology       Assessment/Plan    ECOG Performance   ECOG Performance Status: 0  Distress Assessment  Distress Assessment Score: 5    #.  Chronic thrombocythemia  #.  Postsplenectomy state,in 2014  #.  Hereditary spherocytosis  #.  COVID-19 infection in November 2020.     I review her medical records in detail.  She has chronic thrombocytosis suspected secondary to splenectomy.  Her platelet count has been around 500-600 range with slight fluctuations and not really changed from her baseline in general.  No clinical signs and symptoms suggestive of underlying myeloproliferative neoplasm.  Recent COVID-19 infection perhaps attributed to slight elevation of platelet count from her baseline.  She is gradually recovering from COVID-19 infection.   I discussed with her that her current finding of chronic thrombocytosis is likely secondary to splenectomy and I agree with Dr. Medina.  To complete the work-up, I will request myeloproliferative neoplasm panel.  I do not recommend cytoreduction or thromboprophylaxis at this point.   Follow-up virtual visit in about 2 weeks to review the results and next step in management.      Problem List    1. Thrombocythemia (H)  ARAM 2 V617 F mutation, reflex to CALR, MPL, JAK2 exon 12 - Misc. Lab Test    BCR/ABL p210 by pCR - Misc. Lab Test    HM2(CBC w/o Differential)    Comprehensive Metabolic Panel    Lactate Dehydrogenase (LDH)    ARUP Misc Test    CANCELED: Aram 2 reflex Exon 12 - Misc. Lab Test   2. Post-splenectomy       ______________________________________________________________________________    History    Tonya Thayer is a very pleasant 25-year-old new female presented herself today for further evaluation of thrombocytosis.  She has history of hereditary spherocytosis.   She underwent splenectomy and cholecystectomy in 2014 due to refractory hemolytic anemia.  She has persistent mild thrombocytosis since.  She had a COVID-19 infection in November and she is still having a lot of fatigue, body aches.  She denies prior history of thrombosis.  She reported that she has been taking oral fluids good and appetite is good.  She feels that she is slowly recovering from recent Covid infection.  Denies swollen glands.    She is currently not on any birth control or hormone replacement.    She is single.  She has 1 child.  She is an .  Former smoker.  Does not drink alcohol.  Positive history of spherocytosis in the family.    Past History    Past Medical History:   Diagnosis Date     Anxiety      Asplenia      Cocaine use      Depression      Fever and chills      Hereditary spherocytosis (H)      Leukocytosis 7/18/2016     Pyelonephritis 11/15/2016     Sepsis, due to unspecified organism      SIRS (systemic inflammatory response syndrome) (H) 10/12/2019     Spherocytosis (H)      UTI (urinary tract infection)     Family History   Problem Relation Age of Onset     Clotting disorder Brother         Hereditary Spherocytosis      Past Surgical History:   Procedure Laterality Date     OK LAP,CHOLECYSTECTOMY      Description: Cholecystectomy Laparoscopic;  Recorded: 10/07/2009;     OK REMOVAL SPLEEN, TOTAL      Description: Splenectomy;  Recorded: 12/17/2012;  Comments: 11/12 secondary to hereditary spherocytosis. Recieved pneumovax, HIB and meningococcal vaccine prior to splenectomy. Needs pneumovax every 5 yrs for 2 doses and is on erythromycin prophylaxis. If gets fever >100.5, needs white count, differential, blood culture and strong consideration of rocephin 50-75 mg/kg. Should have CXR for any pul*     OK REMOVAL SPLEEN, TOTAL      Description: Splenectomy;  Recorded: 09/20/2014;  Comments: 11/2012 secondary to hereditary spherocytosis. Recieved pneumovax, HIB and meningococcal  vaccine prior to splenectomy. Needs pneumovax every 5 yrs for 2 doses. If gets fever >100.5, needs white count, differential, blood culture and strong consideration of rocephin 50-75 mg/kg. Should have CXR for any pulmonary symptoms. All above recomm*     SPLENECTOMY      Social History     Socioeconomic History     Marital status: Single     Spouse name: Not on file     Number of children: Not on file     Years of education: Not on file     Highest education level: Not on file   Occupational History     Not on file   Social Needs     Financial resource strain: Not on file     Food insecurity     Worry: Not on file     Inability: Not on file     Transportation needs     Medical: Not on file     Non-medical: Not on file   Tobacco Use     Smoking status: Former Smoker     Years: 3.00     Quit date: 7/15/2020     Years since quittin.5     Smokeless tobacco: Never Used     Tobacco comment: 2 to 3 cigs a day.    Substance and Sexual Activity     Alcohol use: Not Currently     Comment: 1 beer every few months      Drug use: Not Currently     Sexual activity: Not Currently     Partners: Male   Lifestyle     Physical activity     Days per week: Not on file     Minutes per session: Not on file     Stress: Not on file   Relationships     Social connections     Talks on phone: Not on file     Gets together: Not on file     Attends Mandaen service: Not on file     Active member of club or organization: Not on file     Attends meetings of clubs or organizations: Not on file     Relationship status: Not on file     Intimate partner violence     Fear of current or ex partner: Not on file     Emotionally abused: Not on file     Physically abused: Not on file     Forced sexual activity: Not on file   Other Topics Concern     Not on file   Social History Narrative    Lives at home with mother and brother.        Allergies    Allergies   Allergen Reactions     Compazine [Prochlorperazine] Shortness Of Breath, Anxiety,  Palpitations and Dizziness     Levofloxacin Shortness Of Breath     20 minutes after levofloxacin infusion patient felt short of breath and warm.  She had some obvious facial flushing     Buspirone Other (See Comments)     Troubles sleeping, restless feeling.      Tramadol Nausea And Vomiting     Amoxicillin Itching and Rash     Compazine [Prochlorperazine Edisylate] Palpitations     Penicillins Itching and Rash       Review of Systems    General  General (WDL): Exceptions to WDL  Fatigue: Yes - Recent (Less than 3 months)  Fever: None  Generalized Muscle Weakness: None  Weight Loss: No  ENT  ENT (WDL): Exceptions to WDL  Vertigo (Dizziness): None  Changes in vision: None  Glasses or Contacts: Yes - Chronic (Greater than 3 months)  Hearing loss: None  Hearing Aids: None  Tinnitus: Yes - Recent (Less than 3 months)  Pain/Pressure in ears: None  Sinus Congestion/Drainage: None  Hoarseness: None  Sore Throat: None  Dental Problems: None  Dentures: None  Respiratory  Respiratory (WDL): Exceptions to WDL  Dyspnea: Yes - Recent (Less than 3 months)  hemoptysis: None  Is patient on O2?: None  Cough: None  Non-Cardiac Chest Pain: None  Cardiovascular  Cardiovascular (WDL): Exceptions to WDL  Palpitations: Yes - Recent (Less than 3 months)  Edema Limbs: None  Irregular Heart Beat: None  Chest Pain: None  Lightheadedness: Yes - Recent (Less than 3 months)  Endocrine  Endocrine (WDL): All endocrine elements are within defined limits  Gastrointestinal  Gastrointestinal (WDL): All gastrointestinal elements are within defined limits  Musculoskeletal  Musculoskeletal (WDL): All musculoskeletal elements are within defined limits  Neurological  Neurological (WDL): Exceptions to WDL  History of LOC?: None  Headaches: Yes - Recent (Less than 3 months)  Difficulty walking: None  Difficulty with speech: None  Difficulty with memory: None  Vertigo (Dizziness): None  Dominant Hand: Left  Seizures: None  Difficulty with Balance:  "None  Numbness and/or tingling: None  Psychological/Emotional  Psychological/Emotional (WDL): Exceptions to WDL  Depression: Yes - Chronic (Greater than 3 months)  Insomnia: None  Panic attacks: Yes - Chronic (Greater than 3 months)  Anxiety: Yes - Chronic (Greater than 3 months)  Daytime sleepiness: Yes - Recent (Less than 3 months)  Hallucinations: None  Psychosocial needs or not coping well: None  Hematological/Lymphatic  Hematological/Lymphatic (WDL): All hematological/lymphatic elements are within defined limits  Dermatological  Dermatologic (WDL): All dermatological elements are within defined limits  Genitourinary/Reproductive  Genitourinary/Reproductive (WDL): All genitourinary/reproductive elements are within defined limits  Reproductive (Females only)  Menstrual irritation or increase in discharge: No  Age at start of periods: 15  Last menstural cycle: 12/19/20  Number of pregnancies: 2  Age at first pregnancy: 22  Patient Pregnant?: No  Is the patient trying to get pregnant?: No  Is the patient on birth control: No  Pain  Currently in Pain: No/denies    Physical Exam    Recent Vitals 1/14/2021   Height 5' 4\"   Weight 161 lbs 3 oz   BSA (m2) 1.82 m2   /71   Pulse 85   Temp 98.5   Temp src 1   SpO2 98   Some recent data might be hidden     General: alert, awake, not in acute distress  HEENT: Head: Normal, normocephalic, atraumatic.  Eye: Normal external eye, conjunctiva, lids cornea, TREVOR.  Ears:  Non-tender.  Nose: Normal external nose, mucus membranes and septum.  Pharynx: Dental Hygiene adequate. Normal buccal mucosa. Normal pharynx.  Neck / Thyroid: Supple, no masses, nodes, nodules or enlargement.  Lymphatics: No abnormally enlarged lymph nodes.  Abdomen: abdomen is soft without significant tenderness, masses, organomegaly or guarding  Extremities: normal strength, tone, and muscle mass  Skin: normal. no rash or abnormalities  CNS: non focal.    Lab Results    Recent Results (from the past 168 " hour(s))   HM2(CBC w/o Differential)   Result Value Ref Range    WBC 7.9 4.0 - 11.0 thou/uL    RBC 4.57 3.80 - 5.40 mill/uL    Hemoglobin 14.3 12.0 - 16.0 g/dL    Hematocrit 39.4 35.0 - 47.0 %    MCV 86 80 - 100 fL    MCH 31.3 27.0 - 34.0 pg    MCHC 36.3 (H) 32.0 - 36.0 g/dL    RDW 12.7 11.0 - 14.5 %    Platelets 600 (H) 140 - 440 thou/uL    MPV 8.6 8.5 - 12.5 fL   Comprehensive Metabolic Panel   Result Value Ref Range    Sodium 137 136 - 145 mmol/L    Potassium 4.1 3.5 - 5.0 mmol/L    Chloride 103 98 - 107 mmol/L    CO2 26 22 - 31 mmol/L    Anion Gap, Calculation 8 5 - 18 mmol/L    Glucose 84 70 - 125 mg/dL    BUN 14 8 - 22 mg/dL    Creatinine 0.62 0.60 - 1.10 mg/dL    GFR MDRD Af Amer >60 >60 mL/min/1.73m2    GFR MDRD Non Af Amer >60 >60 mL/min/1.73m2    Bilirubin, Total 0.7 0.0 - 1.0 mg/dL    Calcium 8.7 8.5 - 10.5 mg/dL    Protein, Total 8.1 (H) 6.0 - 8.0 g/dL    Albumin 4.4 3.5 - 5.0 g/dL    Alkaline Phosphatase 47 45 - 120 U/L    AST 14 0 - 40 U/L    ALT 12 0 - 45 U/L   Lactate Dehydrogenase (LDH)   Result Value Ref Range    LD (LDH) 167 125 - 220 U/L       Imaging Results    Xr Chest 2 Views    Result Date: 1/6/2021  EXAM: XR CHEST 2 VIEWS LOCATION: [facility] DATE/TIME: 1/6/2021 12:05 PM INDICATION: persistent shortness of breath COMPARISON: None.     Negative chest.        Signed by: Judith Ozuna MD

## 2021-06-14 NOTE — PROGRESS NOTES
Assessment / Plan  1. Shortness of breath  XR Chest 2 Views   2. ZION (generalized anxiety disorder)  escitalopram oxalate (LEXAPRO) 10 MG tablet   3. Sore throat  Rapid Strep A Screen- Throat Swab   4. Fatigue, unspecified type         Patient Instructions   I would recommend testing for strep throat and a chest xray today.  We will let you know the results of these tests later today and would treat accordingly.  If the xray is normal, I would be very reassured that your persistent symptoms are simply related to the COVID-19 infection you had in early December. For some people unfortunately, symptoms following COVID-19 infection can linger for several months.  I would certainly recommend a reassessment if you notice worsening symptoms, but otherwise would expect that the general trend, albeit slow, would be one of improvement.    As we had discussed today, I think it would be reasonable for you to increase your dose of lexapro to 10mg daily.  I sent in a new prescription for this to your [facility] on [address] in [place].  Please schedule a follow-up visit with Dr Medina to review your anxiety symptoms in 5-6 weeks.      Return in about 6 weeks (around 2/17/2021) for with Dr Medina to review effects of increased dose of Lexapro.     36 minutes was spent on chart review, patient visit, ordering medication and  Tests, and documenting visit in the EHR.   ,   Subjective   Tonya Thayer is a 25 y.o. year old female who presents with the following concerns:     Follow-up on COVID-19 - patient had COVID-19 at the end of November/beginning of December.  Since that time she has noted persistent fatigue, excessive somnolence, and shortness of breath .  She notes that even at rest she feels a bit more winded than normal. No associated cough.  No recent fevers.  She notes that the symptoms that she had with COVID-19 included diarrhea, loss of taste/smell, myalgias, fatigue, rhinorrhea, shortness of breath.   Beginning this morning she is noting a mild sore throat and had a mildly elevated temp of 99.5F at home.  No associated nasal congestion or rhinorrhea.     Patient reports that her anxiety symptoms have been more pronounced since her Covid infection as well.  She currently is treated with Lexapro 5 mg daily, propranolol 20 mg twice daily, and as needed use of lorazepam 1 mg up to twice daily.  She reports that prior to her Covid infection she had been using the lorazepam quite sparingly, however since the infection she tends to be using it twice daily consistently.  She notes that in the past she had used Lexapro and found it to be quite effective.  It was discontinued at the end of last year due to a pregnancy, however when she was no longer pregnant she resumed its use, at a lower dose than previous.  Patient reports that the lower dose was related to somewhat of an increase in anxiety that she experienced upon reinitiating the medicine.  She has now been on the Lexapro for a month and a half and thinks that perhaps a higher dose would be warranted.    Patient Active Problem List   Diagnosis     Hereditary Hemolytic Spherocytosis     Allergic Rhinitis     Asplenia     Elevated liver enzymes     Panic disorder     Thrombocytosis (H)     ZION (generalized anxiety disorder)     Nonintractable migraine     Moderate major depression (H)     Past Surgical History:   Procedure Laterality Date     OR LAP,CHOLECYSTECTOMY      Description: Cholecystectomy Laparoscopic;  Recorded: 10/07/2009;     OR REMOVAL SPLEEN, TOTAL      Description: Splenectomy;  Recorded: 12/17/2012;  Comments: 11/12 secondary to hereditary spherocytosis. Recieved pneumovax, HIB and meningococcal vaccine prior to splenectomy. Needs pneumovax every 5 yrs for 2 doses and is on erythromycin prophylaxis. If gets fever >100.5, needs white count, differential, blood culture and strong consideration of rocephin 50-75 mg/kg. Should have CXR for any pul*     OR  REMOVAL SPLEEN, TOTAL      Description: Splenectomy;  Recorded: 2014;  Comments: 2012 secondary to hereditary spherocytosis. Recieved pneumovax, HIB and meningococcal vaccine prior to splenectomy. Needs pneumovax every 5 yrs for 2 doses. If gets fever >100.5, needs white count, differential, blood culture and strong consideration of rocephin 50-75 mg/kg. Should have CXR for any pulmonary symptoms. All above recomm*     SPLENECTOMY         Social History     Tobacco Use     Smoking status: Former Smoker     Years: 3.00     Quit date: 7/15/2020     Years since quittin.4     Smokeless tobacco: Never Used     Tobacco comment: 2 to 3 cigs a day.    Substance Use Topics     Alcohol use: Yes     Comment: 1 beer every few months      Family History   Problem Relation Age of Onset     Clotting disorder Brother         Hereditary Spherocytosis         Current Outpatient Medications   Medication Sig Dispense Refill     albuterol (PROAIR HFA;PROVENTIL HFA;VENTOLIN HFA) 90 mcg/actuation inhaler Inhale 2 puffs every 6 (six) hours as needed for wheezing. 1 each 0     escitalopram oxalate (LEXAPRO) 10 MG tablet Take 1 tablet (10 mg total) by mouth daily. 30 tablet 5     LORazepam (ATIVAN) 1 MG tablet TAKE 1-2 TABLETS BY MOUTH TWICE DAILY AS NEEDED FOR ANXIETY 60 tablet 0     propranoloL (INDERAL) 10 MG tablet Take 2 tablets (20 mg total) by mouth 2 (two) times a day. At lunch time 150 tablet 1     No current facility-administered medications for this visit.      Allergies   Allergen Reactions     Compazine [Prochlorperazine] Shortness Of Breath, Anxiety, Palpitations and Dizziness     Levofloxacin Shortness Of Breath     20 minutes after levofloxacin infusion patient felt short of breath and warm.  She had some obvious facial flushing     Buspirone Other (See Comments)     Troubles sleeping, restless feeling.      Tramadol Nausea And Vomiting     Amoxicillin Itching and Rash     Compazine [Prochlorperazine Edisylate]  Palpitations     Penicillins Itching and Rash       ROS:   Negative except as noted above in HPI.     Objective  /72   Pulse 89   Temp 98.3  F (36.8  C) (Oral)   Resp 22   Wt 166 lb (75.3 kg)   LMP 12/19/2020   SpO2 98%   Breastfeeding No   BMI 27.62 kg/m       General: alert/oriented to person/place. No apparent distress.   Affect: pleasant/cooperative. Speech of normal rate and content  Oral: mild pharyngeal erythema; no exudate  Neck: supple, mild right sided anterior cervical lymphadenopathy  CV: RR s1/s2  Respiratory: clear to auscultation bilaterally     RST: negative  Chest xray: pending    Luigi Gaitan MD   Family medicine [facility]    [facility]

## 2021-06-15 NOTE — PROGRESS NOTES
Ok for abx if not improving.   Leave medication for now and plan on seeing me in 3 months for a physical.     Assessment & Plan     Moderate major depression (H)  -Improved symptom management on the increased dose of Lexapro.  Her PHQ-9 and ZION-7 are still elevated today but we will continue to monitor for now.  She will plan on following up with me in 3 months for her physical and we can assess at that time how she is doing.  She will message me sooner if she would like to increase her dose of Lexapro even to 15 mg orally daily.    Dysfunction of left eustachian tube  -Certainly does have dysfunction of the eustachian tube on the left on exam today but no signs of acute otitis media.  Discussed over-the-counter symptom management including nasal spray such as Afrin or Flonase as well as nasal saline.  She should consider trialing some Mucinex as well and if things are not improving over the next several days she will let me know and I can trial some antibiotics, I would pick something like cefdinir.    ZION (generalized anxiety disorder)  -Stable at this time, slightly flared initially with the addition of increased dose of Lexapro but improving again now.  Continue to monitor and follow-up in 3 months.  -Continue with propranolol scheduled as well for now.            Return in about 3 months (around 5/17/2021) for Annual physical.    Michelle Medina MD  [facility]   Tonya Thayer is 25 y.o. and presents today for the following health issues   HPI   She has had some ear pain since 2/6 when she got home from [place]. She has had a stuffy nose since then. She hasn't been able to pop her ear since then. She has issues with her left ear mainly. She has done Sudafed, zyrtec, nettipot and mucinex. No help.     She is otherwise due for a medication check. Her anxiety got a bit worse with the dose incresae initially but is better now as well. She otherwise feels that  the dose change has helped.     She is taking propranolol daily and as needed. She will take this as needed at night if her heart is racing or if her anxiety is flared.       Review of Systems  Per above      Objective    /66 (Patient Site: Left Arm, Patient Position: Sitting, Cuff Size: Adult Regular)   Pulse 98   Wt 167 lb 14.4 oz (76.2 kg)   SpO2 99%   Breastfeeding No   BMI 28.82 kg/m    Body mass index is 28.82 kg/m .  Physical Exam  GENERAL APPEARANCE: A&A, NAD, well hydrated, well nourished  SKIN:  Normal skin turgor, no lesions/rashes   HEENT: moist mucous membranes, no rhinorrhea, right tympanic membrane is unremarkable, left tympanic membrane shows some bulging in the superior area as well as some inferiorly  NECK: No LAD  CV: RRR, no M/G/R   LUNGS: CTAB  EXTREMITY: no edema   NEURO: no gross deficits   PSYCH: normal affect, minimal flattening, normal eye contact, normal speech pattern, well dressed and groomed

## 2021-06-16 NOTE — TELEPHONE ENCOUNTER
Telephone Encounter by Estella Almaraz at 5/28/2019  2:14 PM     Author: Estella Almaraz Service: -- Author Type: --    Filed: 5/28/2019  2:17 PM Encounter Date: 5/28/2019 Status: Addendum    : Estella Almaraz    Related Notes: Original Note by Estella Almaraz filed at 5/28/2019  2:16 PM       No PA needed, patient just needed to fill a required 7 days supply of an opioid (patient filled 42 out of 60 norco per pharmacy).      The next fill she will be able to fill a full days supply.  Per pharmacy they did not have the Tramadol Rx yet but they did fill the Norco Rx (42 tablets).   They were aware of the 7 days supply fill rule.  For the remaining 18 Norco tabs provider will have to send a new RX to the pharmacy as they cannot fill the remainder of the original Rx.

## 2021-06-16 NOTE — TELEPHONE ENCOUNTER
Medication: lorazepam   Last Date Filled 1/25/2021  Last appointment addressing medication use: 2/17/21      Taken as prescribed from physician notes?     CSA in last year: NO    Random Utox in last year: NO  (if any of the above answer NO - schedule with PCP)     Opioids + benzodiazepines? NO  (if the above answer YES - schedule with PCP every 6 months)       All responses suggest: Scheduling with PCP for further intervention

## 2021-06-16 NOTE — PROGRESS NOTES
ASSESSMENT/PLAN:   1) Amenorrhea: secondary to pregnancy - see #2 below.     2) PRENATAL:  at 8-0 by LMP, giving Estimated Date of Delivery: 18  Education: reviewed prenatal folder, info given on [facility] website. Avoid alcohol and tobacco; minimize caffeine to <2 servings per day; advised regular physical activity; avoid abdominal trauma;  drink >/= 8 cups water/fluids daily; start/continue PNV.  Discussed morning sickness and the importance of frequent small meals in order to decrease nausea.  If she gets to the point that she is unable to keep food/liquids down, she should contact me as we can provide her with medications to help.   Discussed 1st trimester genetic screening, will consider and contact with further questions. Will schedule 1st OB appointment earlier if she wishes to undergo these.   Patient given information on pregnancy today.  Next visit 4-6 weeks. Would like to deliver at [place], will refer to OB or [facility] depending on patient preference.   Will get an ultrasound to establish EDC.  Order placed today.    All questions answered.    Discussed sertraline, will consider, feels she is doing well at this time.     SUBJECTIVE:   Tonya Thayer is a 22 y.o.  presents c/o missed period, nausea, breast tenderness for past 6 weeks. Has been nauseated in January, she started throwing up around the .  Patient's last menstrual period was 12/15/2017 (exact date). (sure, normal, came at expected time). Her periods are regular. Home UPTs x1 on 18 were positive, so she believes she is pregnant. She did have a negative one about one week ago. Is not taking PNV.  No FM yet. No bleeding but some cramping.  She does not have any exposure to cat litter.  She is aware to avoid using hot tubs or jacuzzi's for which the temperature cannot be adjusted.  She has no other concerns.    She did stop all medications. The nausea is getting better. The fatigue is quite terrible. She is taking care of  her sisters 8 month old right now. She does feel a bit dizzy sometimes, and is quite anxious as well.       Current Outpatient Prescriptions:      atenolol (TENORMIN) 25 MG tablet, Take 1 tab daily for anxiety, may repeat if needed once, Disp: 90 tablet, Rfl: 1     LORazepam (ATIVAN) 1 MG tablet, Take 1 tablet (1 mg total) by mouth 2 (two) times a day as needed for anxiety., Disp: 60 tablet, Rfl: 0     norgestimate-ethinyl estradiol (ORTHO TRI-CYCLEN LO, 28,) 0.18/0.215/0.25 mg-25 mcg Tab tablet, Take 1 tablet by mouth daily., Disp: 84 tablet, Rfl: 3     SUMAtriptan (IMITREX) 25 MG tablet, Take 1 tablet (25 mg total) by mouth every 2 (two) hours as needed for migraine (up to 3 pills in 24 hrs)., Disp: 12 tablet, Rfl: 0     venlafaxine (EFFEXOR-XR) 150 MG 24 hr capsule, Take 1 capsule (150 mg total) by mouth daily., Disp: 90 capsule, Rfl: 1  Past Medical History:   Diagnosis Date     Asplenia      Cocaine use      Fever and chills      Hereditary spherocytosis      Leukocytosis 2016     Pyelonephritis 11/15/2016     Sepsis, due to unspecified organism      UTI (urinary tract infection)      Social History     Social History     Marital status: Single     Spouse name: N/A     Number of children: N/A     Years of education: N/A     Occupational History     Not on file.     Social History Main Topics     Smoking status: Former Smoker     Packs/day: 0.50     Years: 3.00     Quit date: 2017     Smokeless tobacco: Never Used     Alcohol use Yes      Comment: 1 beer every few months      Drug use: No     Sexual activity: Yes     Partners: Male     Other Topics Concern     Not on file     Social History Narrative    Lives at home with mother and brother.     OB History    Para Term  AB Living   1        SAB TAB Ectopic Multiple Live Births             # Outcome Date GA Lbr Yordan/2nd Weight Sex Delivery Anes PTL Lv   1 Current                   OBJECTIVE:   /60 (Patient Site: Right Arm, Patient  Position: Sitting, Cuff Size: Adult Regular)  Pulse 88  Wt 155 lb 8 oz (70.5 kg)  LMP 12/15/2017 (Exact Date)  Breastfeeding? No  BMI 27.55 kg/m2  NAD, A&Ox3. Normal affect. No respiratory distress. VS normal (see above).    General:  Denies fever, chills, HA, fatigue, myalgias, weight change    Eyes: Denies vision changes   Ears/Nose/Throat: Denies nasal congestion, rhinorrhea, ear pain or discharge, sore throat, swollen glands  Cardiovascular: CP, palpitations  Respiratory:  SOB, cough  Gastrointestinal:  Denies changes in bowel habits, melena, rectal bleeding,  Genitourinary: Denies changes in urine habits/frequency/dysuria, hematuria   Musculoskeletal:  Denies  joint pain or swelling or erythema, edema  Skin: Denies rashes   Neurologic: Denies weakness, paresthesia  Psychiatric: Denies mood changes   Endocrine: Denies polyuria, polydipsia, polyphagia  Heme/Lymphatic: Denies problem with bleeding   Allergic/Immunologic: Denies problem       No results found for this or any previous visit (from the past 240 hour(s)).       Michelle Medina MD

## 2021-06-16 NOTE — TELEPHONE ENCOUNTER
Controlled Substance Refill Request  Medication Name:   Requested Prescriptions     Pending Prescriptions Disp Refills     LORazepam (ATIVAN) 1 MG tablet 60 tablet 0     Sig: TAKE 1-2 TABLETS BY MOUTH TWICE DAILY AS NEEDED FOR ANXIETY     Date Last Fill: 1/25/21  Requested Pharmacy: [facility]  Submit electronically to pharmacy  Controlled Substance Agreement on file:   Encounter-Level CSA Scan Date - 01/24/2018:    Scan on 1/26/2018 10:23 AM: [facility]        Last office visit:  2/17/21

## 2021-06-16 NOTE — PROGRESS NOTES
ASSESSMENT/PLAN:       1. ZION (generalized anxiety disorder)  -She is definitely feeling an improvement in her symptoms but does still have significant anxiety needing lorazepam on a fairly consistent basis, maybe 1-2 times a week.  Given this we will increase her sertraline to 100 mg orally daily and have her follow-up here in approximately 4-6 weeks for recheck.  - sertraline (ZOLOFT) 100 MG tablet; Take 1 tablet (100 mg total) by mouth daily.  Dispense: 30 tablet; Refill: 2    2. Morning sickness  -Patient continues to feel fairly consistent morning sickness.  Renewing her Zofran today, and she will follow-up in the next few weeks for her first OB visit.  She initially intended on delivering at [facility] but is now preferring to deliver at [facility].  She still has not made a decision on her plan for OB care.  - ondansetron (ZOFRAN, AS HYDROCHLORIDE,) 4 MG tablet; Take 1 tablet (4 mg total) by mouth every 6 (six) hours as needed for nausea.  Dispense: 30 tablet; Refill: 1          Michelle Medina MD      PROGRESS NOTE   3/21/2018    SUBJECTIVE:  Tonya Thayer is a 22 y.o. female  who presents for follow-up.    She is still feeling fairly queasy. It is improving some. She does have a day or two during the week where she feels off or is throwing up. For the most part she does feel pretty well. She is not planning on delivering at [place] anymore, is thinking that she would like to deliver at  instead.     She is doing ok with the anxiety. She doesn't always feel like she has to take the medicine. She does ok most of the time. She would feel like there is more anxiety when she took the Dramamine. The last two weeks haven't been as bad with the sickness and she does have more than half of her bottle from last month still of the lorazepam. She hasn't noted any side effects with the sertraline. She does feel like it is working for her.   Chief Complaint   Patient presents with      Routine Prenatal Visit     Patient is here today for a routine prenatal exam.          Patient Active Problem List   Diagnosis     Hereditary Hemolytic Spherocytosis     Allergic Rhinitis     Asplenia     Elevated liver enzymes     Panic disorder     Anxiety     Thrombocytosis     ZION (generalized anxiety disorder)     Nonintractable migraine       Current Outpatient Prescriptions   Medication Sig Dispense Refill     acetaminophen (TYLENOL) 325 MG tablet Take 650 mg by mouth every 6 (six) hours as needed for pain.       dimenhyDRINATE (DRAMAMINE) 50 MG tablet Take 0.5-1 tablets (25-50 mg total) by mouth 4 (four) times a day as needed. 30 tablet 1     folic acid (FOLVITE) 1 MG tablet Take 1 tablet (1 mg total) by mouth daily. 30 tablet 3     LORazepam (ATIVAN) 1 MG tablet Take 1 tablet (1 mg total) by mouth 2 (two) times a day as needed for anxiety. 60 tablet 0     ondansetron (ZOFRAN, AS HYDROCHLORIDE,) 4 MG tablet Take 1 tablet (4 mg total) by mouth every 6 (six) hours as needed for nausea. 30 tablet 1     prenat.vits,alesha,min-iron-folic Tab Take 1 tablet by mouth daily. 100 each 3     sertraline (ZOLOFT) 100 MG tablet Take 1 tablet (100 mg total) by mouth daily. 30 tablet 2     No current facility-administered medications for this visit.        History   Smoking Status     Former Smoker     Packs/day: 0.50     Years: 3.00     Quit date: 4/27/2017   Smokeless Tobacco     Never Used           OBJECTIVE:        No results found for this or any previous visit (from the past 240 hour(s)).    Vitals:    03/21/18 0744   BP: 102/60   Weight: 151 lb 4.8 oz (68.6 kg)     Weight: 151 lb 4.8 oz (68.6 kg)        Physical Exam:  GENERAL APPEARANCE: A&A, NAD, well hydrated, well nourished  SKIN:  Normal skin turgor, no lesions/rashes   Psych: Her affect is slightly flat, she is casually dressed and groomed, her thought process and speech pattern are normal, no obvious hallucinations  NEURO: no gross deficits     Us Ob < 14 Weeks  With Transvaginal    Result Date: 2/23/2018  US OB < 14 WEEKS WITH TRANSVAGINAL 2/23/2018 7:28 AM INDICATION: confirm viability TECHNIQUE: Transabdominal scans were performed. Endovaginal ultrasound was performed to better visualize the embryo. COMPARISON: 02/14/2018 FINDINGS: Exam now demonstrates a single living intrauterine gestation with crown-rump length 5.5 mm corresponding to gestational age of 6 weeks 3 days. Normal heartbeat at 122 bpm. Normal appearing yolk sac. There is a modest size subchorionic hemorrhage present with somewhat focal nodular prominence along the inferior aspect of the gestational sac. Both ovaries normal in size and appearance. No free pelvic fluid.     CONCLUSION: 1.  Single living intrauterine pregnancy at 6 weeks 3 days based on crown-rump length which gives a sonographic EDC of 10/16/2018. 2.  Modest somewhat nodular subchorionic hemorrhage.    Us Ob < 14 Weeks    Result Date: 3/9/2018  US OB < 14 WEEKS 3/9/2018 8:06 AM INDICATION: Followup subchorionic hemorrhage. TECHNIQUE: Transabdominal scans were performed. COMPARISON: 02/23/2018. FINDINGS: UTERUS: Single intrauterine pregnancy. CRL measures 1.9 cm, equals 8 weeks 4 days. EDC: 10/15/2018. FETAL HEART RATE: 158 bpm. AMNIOTIC FLUID: Normal. PLACENTA: Not yet formed. Previously seen subchorionic blood products have resolved. RIGHT OVARY: Normal. LEFT OVARY: Normal.     CONCLUSION: 1.  Single living intrauterine pregnancy at 8 weeks 4 days with EDC 10/15/2018 consistent with normal interval growth.

## 2021-06-16 NOTE — PROGRESS NOTES
ASSESSMENT/PLAN:       1. Anxiety  -Patient has a long-standing anxiety disorder that was well controlled with venlafaxine until she discontinued it due to her positive pregnancy test.  Her mood has been worsening now over the last month.  Unfortunately it has been exacerbated by a question of possible miscarriage which seems to be an unfounded diagnosis at this time.  We discussed that on very occasional basis she can take an as needed Lorazepam but I limit this as much as possible.  We will start her on daily scheduled sertraline for now and she will follow-up here in approximately 1 month, she does have an existing appointment.  - LORazepam (ATIVAN) 1 MG tablet; Take 1 tablet (1 mg total) by mouth 2 (two) times a day as needed for anxiety.  Dispense: 60 tablet; Refill: 0    2. Morning sickness  -I will have her schedule Dramamine now 3 times a day, continue with as needed Zofran and will have her take folic acid rather than the full prenatal vitamin for this time to see if this helps with getting her nutrition in.  Hopefully the Dramamine will help with her anxiety as well and help alleviate the need for lorazepam usage.  We discussed that we know that this is safe in pregnancy rather than the lorazepam which there is a question about whether or not it is safe.  - dimenhyDRINATE (DRAMAMINE) 50 MG tablet; Take 0.5-1 tablets (25-50 mg total) by mouth 4 (four) times a day as needed.  Dispense: 30 tablet; Refill: 1  - folic acid (FOLVITE) 1 MG tablet; Take 1 tablet (1 mg total) by mouth daily.  Dispense: 30 tablet; Refill: 3  - ondansetron (ZOFRAN, AS HYDROCHLORIDE,) 4 MG tablet; Take 1 tablet (4 mg total) by mouth every 6 (six) hours as needed for nausea.  Dispense: 30 tablet; Refill: 1    3. ZION (generalized anxiety disorder)  - sertraline (ZOLOFT) 50 MG tablet; Take 1/2 pill orally daily for 8 days, then 1 pill orally daily  Dispense: 30 tablet; Refill: 3    The patient is still not sure where she would like to  get her prenatal care from, we discussed [facility] versus Dr. [facility] here.  Following ultrasound next week we discussed that she will need to make a decision.        Michelle Medina MD      PROGRESS NOTE   2/27/2018    SUBJECTIVE:  Tonya Thayer is a 22 y.o. female  who presents for anxiety.     She has been feeling quite nauseated right now. She isn't throwing up but she is super nauseated. She does feel like she wants to vomit all the time but doesn't. It is hard to eat. It is hard to have a meal, is snacking. She is eating fruits and veggies and sandwiches when she can. If she has breakfast she is quite nauseated the rest of the day. She can sometimes have breakfast.She is taking Zofran and that does help some with her nausea.  She cannot take her prenatal vitamin right now though due to this.    She is having increasing anxiety right now. She is waking up with a racing heart every hour. Her anxiety and panic attacks are terrible. She is getting dizzy and almost fell over. When she first stopped taking her medicine she was doing ok but things are getting better. She has tried staying busy, coloring, reading, talking with people. She has tried things that her counselor has recommended. She was on sertraline for about one week in treatment when she was in high school.     She is interested in having the progenity done as well if covered by insurance.   Chief Complaint   Patient presents with     Anxiety     Patient is here today to follow up on her anxiety.          Patient Active Problem List   Diagnosis     Hereditary Hemolytic Spherocytosis     Allergic Rhinitis     Asplenia     Elevated liver enzymes     Panic disorder     Anxiety     Thrombocytosis     ZION (generalized anxiety disorder)     Nonintractable migraine       Current Outpatient Prescriptions   Medication Sig Dispense Refill     ondansetron (ZOFRAN, AS HYDROCHLORIDE,) 4 MG tablet Take 1 tablet (4 mg total) by mouth every 6 (six)  hours as needed for nausea. 30 tablet 1     prenat.vits,alesha,min-iron-folic Tab Take 1 tablet by mouth daily. 100 each 3     acetaminophen (TYLENOL) 325 MG tablet Take 650 mg by mouth every 6 (six) hours as needed for pain.       dimenhyDRINATE (DRAMAMINE) 50 MG tablet Take 0.5-1 tablets (25-50 mg total) by mouth 4 (four) times a day as needed. 30 tablet 1     folic acid (FOLVITE) 1 MG tablet Take 1 tablet (1 mg total) by mouth daily. 30 tablet 3     LORazepam (ATIVAN) 1 MG tablet Take 1 tablet (1 mg total) by mouth 2 (two) times a day as needed for anxiety. 60 tablet 0     sertraline (ZOLOFT) 50 MG tablet Take 1/2 pill orally daily for 8 days, then 1 pill orally daily 30 tablet 3     No current facility-administered medications for this visit.        History   Smoking Status     Former Smoker     Packs/day: 0.50     Years: 3.00     Quit date: 4/27/2017   Smokeless Tobacco     Never Used           OBJECTIVE:        No results found for this or any previous visit (from the past 240 hour(s)).    Vitals:    02/27/18 1051   BP: 110/64   Patient Site: Right Arm   Patient Position: Sitting   Cuff Size: Adult Regular   Pulse: 72   SpO2: 99%   Weight: 152 lb 6.4 oz (69.1 kg)     Weight: 152 lb 6.4 oz (69.1 kg)        Physical Exam:  GENERAL APPEARANCE: A&A, NAD, well hydrated, well nourished  SKIN:  Normal skin turgor, no lesions/rashes   HEENT: moist mucous membranes, no rhinorrhea  NECK: Normal  CV: RRR, no M/G/R   LUNGS: CTAB  EXTREMITY: no edema   NEURO: no gross deficits   Psych: The patient's affect is obviously anxious, she is casually dressed and groomed, her thought process and speech pattern are normal, no obvious hallucinations, eye contact is normal

## 2021-06-17 NOTE — PATIENT INSTRUCTIONS - [facility]
"Patient Instructions by Petros Winchester CNP at 1/16/2019  9:20 AM     Author: Petros Winchester CNP Service: -- Author Type: Nurse Practitioner    Filed: 1/16/2019  9:42 AM Encounter Date: 1/16/2019 Status: Addendum    : Petros Winchester CNP (Nurse Practitioner)    Related Notes: Original Note by Petros Winchester CNP (Nurse Practitioner) filed at 1/16/2019  9:42 AM       You can try the over the counter saline nasal rinse called \"Afshin Med\" for the sinus congestion. The squirt bottle may work better than the tea pot.    The antibiotic has been sent. Don't start this (or pick it up) unless you're showing no improvement in your symptoms by next week Wednesday. If you're starting to get better, without it, the antibiotic won't speed things along and will only cause unnecessary side effects.    3 advil every 6 hours with food for the throat pain. This should start to improve over the next couple of day.    If the throat culture comes back positive, we'll let you know.    Thank you for coming in today!    If you receive a survey from [employer] about your experience today, it would be very helpful if you could fill it out to let us know what went well and what we can improve!    General Information:    Today you had your appointment with Petros Winchester NP    My hours are:    Monday : Out of clinic  Tuesday : 8:00AM - 5:00 PM  Wednesday: 8:00AM - 5:00 PM  Thursday: 8:00AM - 5:00 PM  Friday: 8:00AM - 5:00 PM    I am not in the office Mondays. Therefore non-urgent calls and medical messages received on Monday will be addressed when I am back in the office on Tuesday. Urgent matters will be reviewed and addressed by one of my partners in the office as needed.    If lab work was done today as part of your evaluation you will generally be contacted via [employer], mail, or phone with the results within 1-5 days. If there is an alarming result we will contact you by phone. Lab results come back at varying times, I generally " wait until all lab results are available before making comments on the results.     If you need refills please contact your pharmacy. They will send a refill request to me to review. Please allow 3-5 business days for us to process all refill requests.     My Clinical Assistant is Melvi. Please call us at [phone] or send a medical message with any questions or concerns.         Patient Education     Azithromycin tablets  Brand Names: Zithromax, Zithromax Tri-Marvin, Zithromax Z-Marvin  What is this medicine?  AZITHROMYCIN (az ith derreklupe STRONG sin) is a macrolide antibiotic. It is used to treat or prevent certain kinds of bacterial infections. It will not work for colds, flu, or other viral infections.  How should I use this medicine?  Take this medicine by mouth with a full glass of water. Follow the directions on the prescription label. The tablets can be taken with food or on an empty stomach. If the medicine upsets your stomach, take it with food. Take your medicine at regular intervals. Do not take your medicine more often than directed. Take all of your medicine as directed even if you think your are better. Do not skip doses or stop your medicine early.  Talk to your pediatrician regarding the use of this medicine in children. While this drug may be prescribed for children as young as 6 months for selected conditions, precautions do apply.  What side effects may I notice from receiving this medicine?  Side effects that you should report to your doctor or health care professional as soon as possible:    allergic reactions like skin rash, itching or hives, swelling of the face, lips, or tongue    confusion, nightmares or hallucinations    dark urine    difficulty breathing    hearing loss    irregular heartbeat or chest pain    pain or difficulty passing urine    redness, blistering, peeling or loosening of the skin, including inside the mouth    white patches or sores in the mouth    yellowing of the eyes or  skin  Side effects that usually do not require medical attention (report to your doctor or health care professional if they continue or are bothersome):    diarrhea    dizziness, drowsiness    headache    stomach upset or vomiting    tooth discoloration    vaginal irritation  What may interact with this medicine?  Do not take this medicine with any of the following medications:    lincomycin  This medicine may also interact with the following medications:    amiodarone    antacids    birth control pills    cyclosporine    digoxin    magnesium    nelfinavir    phenytoin    warfarin  What if I miss a dose?  If you miss a dose, take it as soon as you can. If it is almost time for your next dose, take only that dose. Do not take double or extra doses.  Where should I keep my medicine?  Keep out of the reach of children.  Store at room temperature between 15 and 30 degrees C (59 and 86 degrees F). Throw away any unused medicine after the expiration date.  What should I tell my health care provider before I take this medicine?  They need to know if you have any of these conditions:    kidney disease    liver disease    irregular heartbeat or heart disease    an unusual or allergic reaction to azithromycin, erythromycin, other macrolide antibiotics, foods, dyes, or preservatives    pregnant or trying to get pregnant    breast-feeding  What should I watch for while using this medicine?  Tell your doctor or healthcare professional if your symptoms do not start to get better or if they get worse.  Do not treat diarrhea with over the counter products. Contact your doctor if you have diarrhea that lasts more than 2 days or if it is severe and watery.  This medicine can make you more sensitive to the sun. Keep out of the sun. If you cannot avoid being in the sun, wear protective clothing and use sunscreen. Do not use sun lamps or tanning beds/booths.  NOTE:This sheet is a summary. It may not cover all possible information. If  you have questions about this medicine, talk to your doctor, pharmacist, or health care provider. Copyright  2018 [employer]

## 2021-06-17 NOTE — PROGRESS NOTES
OV   4/2/2018  Assessment:      1. Supervision of normal first pregnancy in first trimester  ABO/RH Typing (OP order)    Hepatitis B Surface antigen (HBsAG)    HIV Antigen/Antibody Screening Cascade    HM1(CBC and Differential)    HML Antibody Screen    RPR    Rubella Immune Status (IgG)    Urinalysis Macroscopic    Culture, Urine    Chlamydia/gonorrhoeae, URINE    HM1 (CBC with Diff)    Herpes Simplex Virus (HSV) Antibody, IgM by IFA            Plan:        Initial labs drawn. UA/UC,  GC/Chlamydia sent.    Given history of possible herpes infection and we will see if we can run antibodies to herpes.  If positive consider prophylactic treatment in the final month.  Prenatal vitamins.  Problem list reviewed and updated.  First trimester screening/AFP3 discussed: requested.  Role of ultrasound in pregnancy discussed; fetal survey: results reviewed.  Amniocentesis discussed: not indicated.  General educational information given, including benefits of breast-feeding. See AVS for details.   Follow up in 4 weeks.    >50% of 40 min visit spent on counseling and coordination of care.         Subjective:             Tonya Thayer is being seen today for her first obstetrical visit.  This is a planned pregnancy. She is at 11-6 gestation. Her obstetrical history is significant for na. Relationship with FOB: significant other, living together. Patient does intend to breast feed. Pregnancy history fully reviewed.        Current symptoms also include: morning sickness and nausea improving.  Sexual Activity: single partner, contraception: none.         Obstetrical history is significant for: na  Patient's last menstrual period was 12/15/2017 (exact date).. Last period was normal.    She does have a possible history of herpes when she was seen in the ER with lesion on her labia.  HSV testing was negative at the time.  Her partner has had no symptoms.  He had testing done and it was negative.    The following portions of the  patient's history were reviewed and updated as appropriate: allergies, current medications, past family history, past medical history, past social history, past surgical history and problem list.    Infection History   - Live with someone with TB or exposed to TB No  - Patient reported with history of genital herpes No  - Rash or viral illness since LMP   No  - Prior GBS infected child    Na  - Hepatitis B or C     No  - Gonorrhea      No  - Chlamydia      No  - HPV       No  - HIV       No  - Syphilis      No  - Other STI's      No  - Other (see comments)    No        Genetic Screening/Teratology Counseling  - Patient's age 35 years or older as of estimated date of delivery  No  - Thalassemia    (Italian, Greek, Mediterranean, or  background) No  - MCV less than 80       No   - Neural tube defects    (meningomyelocele, spina bifida, or anencephaly)  No  - Congenital heart defect      No- patient's father with heart murmur  - Down syndrome       No  - Hernan-Sachs ([employer]kenazi [employer], Cajun, [place])   No  - Canavan Disease ([employer])    No  - Familial Dysautonomia ([employer])    No  - Sickle cell disease or trait ()     No  - Hemophilia or other blood disorders     No  - Muscular dystrophy       No  - Cystic fibrosis       No  - Mason's chorea       No  - Mental retardation/autism       No   (If yes, was the patient tested for fragile X?)     - Other inherited genetic or chromosomal disorders   No  - Maternal metabolic disorders (type 1 diabetes, PKU)  No  - Patient or baby's father had a child with birth defects   No  - Recurrent pregnancy loss, or stillbirth    No  - Medications   (including supplements, vitamins, herbs, OTC drugs)   /illicit/recreational drugs/alcohol since last LMP    list agents and strength, dosing    Yes-PNV, Zofran, sertraline, tylenol  Any other (see comments)      No      Discussed in today's office visit  HIV and other routine prenatal tests      Yes  Risk Factors Identified by Prenatal history   Yes  Anticipated course of prenatal care    Yes  Nutrition and weight gain counseling: special diet   Yes  Toxoplasmosis precautions (Cats/Raw meat)  Yes  Sexual activity       Yes  Exercise       Yes  Influenza vaccine      Yes  Smoking counseling      Yes  Environmental/work hazards     Yes-spa,   Travel        Yes  Tobacco (asked, advise, assess, assist and arrange) Yes  Alcohol        Yes  Illicit/recreational drugs     Yes  Use of any meds   (including supplements, vitamins, herbs, OTC drugs) Yes  Indications for ultrasound     Yes  Domestic violence      Yes  Seat Belt Use        Yes  Childbirth classes/hospital facilities    Yes  Dental care       Yes  Avoidance of saunas or hot tubs    Yes  Teratogens        Yes  Breast-feeding       Yes  Screening for aneuploidy     Yes      Review of Systems  Pertinent items are noted in HPI.          Objective:      /72 (Patient Site: Left Arm, Patient Position: Sitting, Cuff Size: Adult Regular)  Wt 153 lb 6.4 oz (69.6 kg)  LMP 12/15/2017 (Exact Date)  Breastfeeding? No  BMI 27.17 kg/m2  General: alert, pleasant, and no distress  Head: Normocephalic, without obvious abnormality, atraumatic  Eyes: conjunctivae and sclerae normal and pupils equal, round, reactive to   light and accomodation  Ears: normal TM's and external ear canals both ears  Nose: no discharge, no sinus tenderness  Throat: lips, mucosa, and tongue normal; teeth and gums normal  Neck: no adenopathy, supple, symmetrical, trachea midline and thyroid normal  Back: symmetric, ROM normal. No CVA tenderness.  Lungs: clear to auscultation bilaterally  Breasts: normal appearance, no masses or tenderness  Heart:: regular rate and rhythm and no murmurs  Abdomen:  bowel sounds normal, no masses palpable and soft, non-tender  Pelvic: deferred, no complaints  Extremities: extremities normal, atraumatic, no cyanosis or edema  Pulses: 2+ and  symmetric  Skin: Skin color, texture, turgor normal. No rashes or lesions  Lymph nodes: Cervical, supraclavicular, and axillary nodes normal.  Neurologic: Grossly normal

## 2021-06-17 NOTE — PROGRESS NOTES
SUBJECTIVE:  Tonya Thayer is a 22 y.o. female.  She has had a little more nausea again. She is trying to take medication only on a limited basis. She is drinking water. Getting outside. She is maybe feeling baby now, still uncommon.     She otherwise is feeling ok.     Chief Complaint   Patient presents with     Routine Prenatal Visit     16 weeks.        OBJECTIVE:   /70  Wt 155 lb 9.6 oz (70.6 kg)  LMP 12/15/2017 (Exact Date)  Breastfeeding? No  BMI 27.56 kg/m2   Please see prenatal data for fundal height and fetal HR.     ASSESSMENT/PLAN:   Tonya Thayer is a 22 y.o. female  who presents for prenatal check. Patient is doing well.   US ordered, medications renewed (sertraline, zofran and lorazepam)  F/u in four weeks.   Discussed very sparing use of the lorazepam.

## 2021-06-17 NOTE — PROGRESS NOTES
"  Assessment:     1. Ocular migraine     2. Second trimester pregnancy            Plan:         We reviewed the likely/potential etiology(ies) for her vision symptoms and I recommended that she take ibuprofen 600-800 mg this afternoon and rest. We discussed limited use of NSAIDs, but not beyond 20 wks gestation. I stressed the importance of adequate rest, hydration and nutrition. We reviewed indications for urgent evaluation, and she will call or return to clinic with any ongoing or worsening symptoms. She will otherwise will f/u as directed for her next OB visit.       Subjective:         Headaches   Tonya Thayer is a 22 y.o. female who presents for evaluation of a headache and vision changes. She is 17 wks pregnant an was at a movie earlier today when she developed blurred vision and bleached out images on her left lateral visual field. She got up and felt dizzy, and tried drinking some water without any significant change. She developed a frontal headache shortly after onset of the vision changes. The vision symptoms lasted 30-45 min and feels like her vision is not entirely normal yet. She had eaten a good breakfast this morning and has been drinking plenty of fluids. She denies recent URI symptoms, fever or chills. She has had some ongoing mild nausea but much better than in her first trimester. She rates the headache pain as bad as 7 earlier, now 5-6. She took tylenol without significant change in the headache.     The following portions of the patient's history were reviewed and updated as appropriate: allergies, current medications, past family history, past medical history, past social history, past surgical history and problem list.    Review of Systems  Pertinent items are noted in HPI.      Objective:        /56  Pulse 88  Ht 5' 3\" (1.6 m)  Wt 155 lb 6 oz (70.5 kg)  LMP 12/15/2017 (Exact Date)  Breastfeeding? No  BMI 27.52 kg/m2  Physical Exam:  GEN: Alert and oriented, NAD,  well " nourished  SKIN:  Normal skin turgor, no lesions/rashes   HEENT: moist mucous membranes, no rhinorrhea.    NECK: Normal.  No adenopathy or thyromegaly.  CV: Regular rate and rhythm, no murmurs.   LUNGS: Clear to auscultation bilaterally.    ABDOMEN: Soft, non-tender, non-distended, no masses   EXTREMITY: No edema, cyanosis  NEURO: Grossly normal.

## 2021-06-18 NOTE — PROGRESS NOTES
SUBJECTIVE:  Tonya Thayer is a 22 y.o. female.  She is doing ok in general. She is wondering if it is normal for her feet to be swollen when she is working and doing a lot. She does get marks from her socks.     She is not having headaches, blurry vision, ruq pain. She does have some cramping at times. She does feel baby moving as well.  Chief Complaint   Patient presents with     Routine Prenatal Visit     21 weeks.        OBJECTIVE:   /76 (Patient Site: Left Arm, Patient Position: Sitting, Cuff Size: Adult Regular)  Wt 159 lb 11.2 oz (72.4 kg)  LMP 12/15/2017 (Exact Date)  Breastfeeding? No  BMI 28.29 kg/m2   Please see prenatal data for fundal height and fetal HR.     ASSESSMENT/PLAN:   Tonya Thayer is a 22 y.o. female  who presents for prenatal check. Patient is doing well.    F/u in 4 weeks.   Letter for 3d/4d ultrasound printed today as well

## 2021-06-18 NOTE — PATIENT INSTRUCTIONS - HE
Patient Instructions by Michelle Medina MD at 2/17/2021 10:40 AM     Author: Michelle Medina MD Service: -- Author Type: Physician    Filed: 2/17/2021 11:06 AM Encounter Date: 2/17/2021 Status: Signed    : Michelle Medina MD (Physician)       Patient Education     Earache, No Infection (Adult)  Earaches can happen without an infection. This occurs when air and fluid build up behind the eardrum causing a feeling of fullness and discomfort and reduced hearing. This is called otitis media with effusion (OME) or serous otitis media. It means there is fluid in the middle ear. It is not the same as acute otitis media, which is typically from infection.  OME can happen when you have a cold if congestion blocks the passage that drains the middle ear. This passage is called the eustachian tube. OME may also occur with nasal allergies or after a bacterial middle ear infection.    The pain or discomfort may come and go. You may hear clicking or popping sounds when you chew or swallow. You may feel that your balance is off. Or you may hear ringing in the ear.  It often takes from several weeks up to 3 months for the fluid to clear on its own. Oral pain relievers and ear drops help if there is pain. Decongestants and antihistamines sometimes help. Antibiotics don't help since there is no infection. Your doctor may prescribe a nasal spray to help reduce swelling in the nose and eustachian tube. This can allow the ear to drain.  If your OME doesn't improve after 3 months, surgery may be used to drain the fluid and insert a small tube in the eardrum to allow continued drainage.  Because the middle ear fluid can become infected, it is important to watch for signs of an ear infection which may develop later. These signs include increased ear pain, fever, or drainage from the ear.  Home care  The following guidelines will help you care for yourself at home:    You may use over-the-counter  medicine as directed to control pain, unless another medicine was prescribed. If you have chronic liver or kidney disease or ever had a stomach ulcer or GI bleeding, talk with your doctor before using these medicines. Aspirin should never be used in anyone under 18 years of age who is ill with a fever. It may cause severe liver damage.    You may use over-the-counter decongestants such as phenylephrine or pseudoephedrine. But they are not always helpful. Don't use nasal spray decongestants more than 3 days. Longer use can make congestion worse. Prescription nasal sprays from your doctor don't typically have those restrictions.    Antihistamines may help if you are also having allergy symptoms.    You may use medicines such as guaifenesin to thin mucus and promote drainage.  Follow-up care  Follow up with your healthcare provider or as advised if you are not feeling better after 3 days.  When to seek medical advice  Call your healthcare provider right away if any of the following occur:    Your ear pain gets worse or does not start to improve     Fever of 100.4 F (38 C) or higher, or as directed by your healthcare provider    Fluid or blood draining from the ear    Headache or sinus pain    Stiff neck    Unusual drowsiness or confusion  Date Last Reviewed: 10/1/2016    3052-6005 [employer]. [address] [place] [address], [place] [address]. All rights reserved. This information is not intended as a substitute for professional medical care. Always follow your healthcare professional's instructions.

## 2021-06-19 NOTE — PROGRESS NOTES
Patient presents from the ED with c/o dizziness, weakness, cramps, heartburn, swelling, floaters, headache and generally not feeling well.  BP on admit 155/67. Orders to get HELPP panel and call Dr. Medina with results.

## 2021-06-19 NOTE — PROGRESS NOTES
After evaluation for 50 minutes patient is normotensive and all labs are WNL. Dr Medina called.  Plan to send home unless patient would like IV fluids.  Discussed options with patient.  Patient states she has been drinking a lot of water and doesn't feel like she needs the fluids and feels comfortable going home if everything is coming back negative. Patient will be discharged and Dr. Medina will call patient after talking to her partners about other options.

## 2021-06-19 NOTE — PROGRESS NOTES
SUBJECTIVE:  Tonya Thayer is a 22 y.o. female.  She does have continued headaches, got quite a bit worse yesterday. She has not had migraines in the past. She is dizzy now, is light sentisive. She did have issues with sound last night as well. She does have nausea. Did try two T3 in the morning and evening yesterday, didn't help with the pain, and made her more nausea. She does feel that the icepacks helped some. She did not try benadryl at all.     She does have some congestion, it is worse than normal. She does have a weird cough.     Baby is moving ok. She has not had any swelling.   Chief Complaint   Patient presents with     Routine Prenatal Visit     30 weeks.      Migraine     Patient has been having migraine headaches. Patient has been sensitive to bright lights and feels dizzy at times with the headaches.      [headaches, vision changes, edema, abdominal pain, cramping, BH] [fluid leaking, discharge]    OBJECTIVE:   /64 (Patient Site: Left Arm, Patient Position: Sitting)  Pulse (!) 104  Wt 169 lb 3.2 oz (76.7 kg)  LMP 12/15/2017 (Exact Date)  SpO2 98%  Breastfeeding? No  BMI 29.97 kg/m2   Please see prenatal data for fundal height and fetal HR.     HEENT: Sinuses are mildly tender to palpation in the maxillary and frontal sinuses bilaterally, pharynx is unremarkable, tympanic membranes are clear bilaterally  CV: Heart is regular in rate and rhythm without murmurs  Lungs: Are clear to auscultation bilaterally without wheezing  Neuro: Gait is appropriate, reflexes are normal    ASSESSMENT/PLAN:   Tonya Thayer is a 22 y.o. female  who presents for prenatal check.   She is generally doing well from an OB perspective, I am treating her for sinusitis right now given her known lack of the spleen and her worsening headaches and exam findings.  Unfortunately given her allergies and pregnancy status and limited to azithromycin.  She will let me know if things are not resolving over the  next few days and we can consider further workup at that time.

## 2021-06-19 NOTE — PROGRESS NOTES
SUBJECTIVE:  Tonya Thayer is a 22 y.o. female.  Still having some swelling. Headaches are still there, no worse. She does feel them daily. She does still have the swelling as well in her feet. She has had no ruq pain, no obvious blurry vision.     Chief Complaint   Patient presents with     Routine Prenatal Visit     28 weeks. Patient has still been having headaches and bilateral foot and calf swelling.        OBJECTIVE:   /80 (Patient Site: Right Arm, Patient Position: Sitting, Cuff Size: Adult Regular)  Wt 168 lb 12.8 oz (76.6 kg)  LMP 12/15/2017 (Exact Date)  Breastfeeding? No  BMI 29.9 kg/m2   Please see prenatal data for fundal height and fetal HR.     ASSESSMENT/PLAN:   Tonya Thayer is a 22 y.o. female  who presents for prenatal check. Patient is doing well.    Persistent headache, will update PIH labs again today, although BP is normal.   F/u if it worsens otherwise in two weeks.

## 2021-06-19 NOTE — PROGRESS NOTES
SUBJECTIVE:  Tonya Thayer is a 22 y.o. female.  She is still feeling miserable. She does still have some nausea. She hasn't had to take a stool softener for some time though which is good.     She does at times have a stomach ache feeling in her lower abdomen at times. They will come and go at times. Baby moves a lot. There is a lot of activity. She did have some swelling, it does get worse with working. It's better now with being off. She does feel short of breath at times as well. She does feel that this is normal pregnancy shortness of breath. She does have headaches at times as well, nearly every day.She has no blurry vision.     Her mood is better now than it was. She has been handling things better than she was.   Chief Complaint   Patient presents with     Routine Prenatal Visit     26 weeks. Patient has been noticing swelling in her legs, ankles and her feet and has been having troubles with feeling short of breath, even at times of rest.        OBJECTIVE:   /62 (Patient Site: Right Arm, Patient Position: Sitting, Cuff Size: Adult Regular)  Pulse 88  Wt 166 lb 12.8 oz (75.7 kg)  LMP 12/15/2017 (Exact Date)  SpO2 98%  Breastfeeding? No  BMI 29.55 kg/m2   Please see prenatal data for fundal height and fetal HR.     ASSESSMENT/PLAN:   Tonya Thayer is a 22 y.o. female  who presents for prenatal check. Patient is doing well.    Follow up in 2 weeks for a recheck.   Discussed normal increased cardiac input, breathing comfortably here. Will watch

## 2021-06-19 NOTE — PROGRESS NOTES
"[facility] OB Triage    Subjective: Tonya Thayer is a  22 y.o. female at 26w4d with no significant prenatal history who presents to OB triage with a generalized headache associated with vision changes. Pt began having a headache one day ago associated with intermittent episodes of blurry vision. Pt describes seeing white eclipses in her visual field. Also having nausea with an episode of vomiting. Previous episode during 17 weeks. Today, pt had another episode with peripheral field visual loss.     Fetal Movement: normal. No contractions. No vaginal bleeding, no leakage of fluid.     Estimated Date of Delivery: 10/16/18 Patient's last menstrual period was 12/15/2017 (exact date).  OB provider: Michelle Medina MD  OB History      Para Term  AB Living    1         SAB TAB Ectopic Multiple Live Births                  Objective:   Blood pressure 126/64, pulse 85, temperature 98.6  F (37  C), temperature source Oral, resp. rate 16, height 5' 3\" (1.6 m), weight 166 lb (75.3 kg), last menstrual period 12/15/2017, not currently breastfeeding.  General:   alert, appears stated age, cooperative and no distress   Skin:   normal and no rash or abnormalities   HEENT:  PERRLA, extra ocular movement intact and sclera clear, anicteric   Lungs:   clear to auscultation bilaterally   Heart:   regular rate and rhythm, S1, S2 normal, no murmur, click, rub or gallop   Extremities: Warm, dry and well perfused. No edema.   Neuro: Reflexes 2+ and symmetric.    Abdomen: FHT present and No tenderness to palpation to abdomen, especially RUQ                                             Laboratory Studies:   Results for orders placed or performed during the hospital encounter of 18   Urinalysis, OB Screen   Result Value Ref Range    Glucose, UA Negative Negative    Ketones, UA Negative Negative    Protein, UA 30 mg/dL (!) Negative mg/dL   ALT (current @ALT(SGPT)   Result Value Ref Range    " ALT <9 0 - 45 U/L   AST (current @AST(SGOT)   Result Value Ref Range    AST 12 0 - 40 U/L   INR   Result Value Ref Range    INR 1.02 0.90 - 1.10   APTT(PTT)   Result Value Ref Range    PTT 26 24 - 37 seconds   HM2(CBC w/o Differential)   Result Value Ref Range    WBC 16.8 (H) 4.0 - 11.0 thou/uL    RBC 3.65 (L) 3.80 - 5.40 mill/uL    Hemoglobin 11.4 (L) 12.0 - 16.0 g/dL    Hematocrit 31.9 (L) 35.0 - 47.0 %    MCV 87 80 - 100 fL    MCH 31.2 27.0 - 34.0 pg    MCHC 35.7 32.0 - 36.0 g/dL    RDW 14.0 11.0 - 14.5 %    Platelets 538 (H) 140 - 440 thou/uL    MPV 9.0 8.5 - 12.5 fL   Type and Screen   Result Value Ref Range    ABORh A POS     Antibody Screen Negative Negative   Creatinine   Result Value Ref Range    Creatinine 0.55 (L) 0.60 - 1.10 mg/dL    GFR MDRD Af Amer >60 >60 mL/min/1.73m2    GFR MDRD Non Af Amer >60 >60 mL/min/1.73m2   Protein/Creatinine Ratio, Urine Random   Result Value Ref Range     Protein, Random Urine 13 mg/dL    Creatinine, Urine 191.2 mg/dL    Protein/Creatinine Ratio, Random Urine 0.07    Uric Acid   Result Value Ref Range    Uric Acid 3.6 2.0 - 7.5 mg/dL        ASSESSMENT AND PLAN: Tonya Thayer is a  22 y.o. female who presented to [facility] at 26w4d on 2018 with a progressive generalized headache associated with visual changes. BP wnl. HELLP labs negative. No other signs of preeclampsia. Fetal monitor category I tracing with no contractions. Headache likely due to migraine.   1. Not in labor.  2. Tylenol 650mg and reglan 10mg given  3. Oral rehydration  4.   If symptoms improve/resolve, pt can be discharged and follow up with Dr. Medina in clinic next week.     Patient discussed with attending physician, Dr. Medina, who agrees with the plan.      Keesha Zhao MD PGY1 2018  [facility]      I have discussed the patient with the resident and agree with the above stated plan. Suspicion for something more sinister such as head bleed, tumor  low due to the duller nature of the pain and normal exam. If headache resolves with tylenol, reglan and hydration she may discharge home. If not, can consider further work up.

## 2021-06-19 NOTE — PROGRESS NOTES
SUBJECTIVE:  Tonya Thayer is a 22 y.o. female.  She is generally doing well.     She does think that she has had heartburn. She feels like someone is squeezing her chest and then she will get some shoulder aching. Tums helps sometimes, but not always. She hashad some tightneing in her lower back the last few days, maybe from her baby getting bigger. It is more tight and uncomfortable, not sharp.     She has had more anxiety, three panic attacks. Her left hand has been itching. She does have a few spots as well.     She had a pain up on the top of her stomach, it was bilateral, was tight, felt like someone was squeezing right under her chest. It was only once. It was a couple of hours on and off. She did have some issues falling asleep. She is staring to leak from the nipples as well.   Chief Complaint   Patient presents with     Routine Prenatal Visit     30 weeks.      [headaches, vision changes, edema, abdominal pain, cramping, BH] [fluid leaking, discharge]    OBJECTIVE:   /76 (Patient Site: Right Arm, Patient Position: Sitting, Cuff Size: Adult Regular)  Wt 169 lb 8 oz (76.9 kg)  LMP 12/15/2017 (Exact Date)  Breastfeeding? No  BMI 30.03 kg/m2   Please see prenatal data for fundal height and fetal HR.     ASSESSMENT/PLAN:   Tonya Thayer is a 22 y.o. female  who presents for prenatal check. Patient is doing well.    Continues with mild headaches but BP continues to be jose.  For her anxiety, will increase sertraline to 150 mg daily, f/u in a few weeks  Will start ranitidine for her heartburn.   Low back seems normal, discussed signs of  labor, will watch for now.

## 2021-06-19 NOTE — PROGRESS NOTES
ASSESSMENT/PLAN:       1. Nonintractable migraine  -Exam is unremarkable today with the exception of light activity.  Discussed taking Tylenol, drinking lots of fluids, watching for signs of worsening headache.  I will provide her with a small prescription of Tylenol with Codeine to take as well for the severe headache.  Thankfully baby continues to sound normal today.  - acetaminophen-codeine (TYLENOL #3) 300-30 mg per tablet; Take 1-2 tablets by mouth every 6 (six) hours as needed for pain.  Dispense: 20 tablet; Refill: 0        Michelle Medina MD      PROGRESS NOTE   7/16/2018    SUBJECTIVE:  Tonya Thayer is a 22 y.o. female  who presents for continued headache.     She comes in today for a headache. Started 3-4 days ago. Was evaluted at OB over the weekend with normal labs. She does continue to have the headache. It was somewhat better before leaving the hospital due to it being dark and quiet. As soon as she got home she started seeing spots again, just as bad as before she went in there. She has pain with cougghing. There is pain over the top of her head.     She does have a stuffy nose all pregnancy, not any worse than it has been. She hasn't noticed any pressure in her face. She hasn't had a fever. She hasn't felt feverish. Her appetite is down as she is nauseated with the migraine. She did get reglan in the hospital as welll and that made her super sleepy.   Chief Complaint   Patient presents with     Migraine     Hospital Visit Follow Up     [facility] -spotting since Thursday or Friday         Patient Active Problem List   Diagnosis     Hereditary Hemolytic Spherocytosis     Allergic Rhinitis     Asplenia     Elevated liver enzymes     Panic disorder     Anxiety     Thrombocytosis (H)     ZION (generalized anxiety disorder)     Nonintractable migraine       Current Outpatient Prescriptions   Medication Sig Dispense Refill     acetaminophen (TYLENOL) 325 MG tablet Take 650 mg by mouth every  6 (six) hours as needed for pain.       dimenhyDRINATE (DRAMAMINE) 50 MG tablet Take 0.5-1 tablets (25-50 mg total) by mouth 4 (four) times a day as needed. 30 tablet 1     folic acid (FOLVITE) 1 MG tablet Take 1 tablet (1 mg total) by mouth daily. 30 tablet 3     hydrOXYzine pamoate (VISTARIL) 50 MG capsule Take 1 capsule (50 mg total) by mouth 3 (three) times a day as needed for anxiety. 30 capsule 0     LORazepam (ATIVAN) 1 MG tablet Take 1 tablet (1 mg total) by mouth 2 (two) times a day as needed for anxiety. 30 tablet 0     ondansetron (ZOFRAN) 4 MG tablet Take 1 tablet (4 mg total) by mouth every 6 (six) hours as needed for nausea. 30 tablet 1     prenat.vits,alesha,min-iron-folic Tab Take 1 tablet by mouth daily. 100 each 3     sertraline (ZOLOFT) 100 MG tablet Take 1 tablet (100 mg total) by mouth daily. 30 tablet 5     acetaminophen-codeine (TYLENOL #3) 300-30 mg per tablet Take 1-2 tablets by mouth every 6 (six) hours as needed for pain. 20 tablet 0     No current facility-administered medications for this visit.        History   Smoking Status     Former Smoker     Packs/day: 0.50     Years: 3.00     Quit date: 4/27/2017   Smokeless Tobacco     Never Used           OBJECTIVE:        Recent Results (from the past 240 hour(s))   Urinalysis, OB Screen   Result Value Ref Range    Glucose, UA Negative Negative    Ketones, UA Negative Negative    Protein, UA 30 mg/dL (!) Negative mg/dL   Protein/Creatinine Ratio, Urine Random   Result Value Ref Range     Protein, Random Urine 13 mg/dL    Creatinine, Urine 191.2 mg/dL    Protein/Creatinine Ratio, Random Urine 0.07    ALT (current @ALT(SGPT)   Result Value Ref Range    ALT <9 0 - 45 U/L   AST (current @AST(SGOT)   Result Value Ref Range    AST 12 0 - 40 U/L   INR   Result Value Ref Range    INR 1.02 0.90 - 1.10   APTT(PTT)   Result Value Ref Range    PTT 26 24 - 37 seconds   HM2(CBC w/o Differential)   Result Value Ref Range    WBC 16.8 (H) 4.0 - 11.0 thou/uL     RBC 3.65 (L) 3.80 - 5.40 mill/uL    Hemoglobin 11.4 (L) 12.0 - 16.0 g/dL    Hematocrit 31.9 (L) 35.0 - 47.0 %    MCV 87 80 - 100 fL    MCH 31.2 27.0 - 34.0 pg    MCHC 35.7 32.0 - 36.0 g/dL    RDW 14.0 11.0 - 14.5 %    Platelets 538 (H) 140 - 440 thou/uL    MPV 9.0 8.5 - 12.5 fL   Type and Screen   Result Value Ref Range    ABORh A POS     Antibody Screen Negative Negative   Creatinine   Result Value Ref Range    Creatinine 0.55 (L) 0.60 - 1.10 mg/dL    GFR MDRD Af Amer >60 >60 mL/min/1.73m2    GFR MDRD Non Af Amer >60 >60 mL/min/1.73m2   Uric Acid   Result Value Ref Range    Uric Acid 3.6 2.0 - 7.5 mg/dL       Vitals:    07/16/18 1505 07/16/18 1523   BP: 134/66 110/58   Patient Site: Left Arm    Patient Position: Sitting    Cuff Size: Adult Regular    Pulse: 86    Weight: 165 lb 12.8 oz (75.2 kg)      Weight: 165 lb 12.8 oz (75.2 kg)        Physical Exam:  GENERAL APPEARANCE: A&A, NAD, well hydrated, well nourished  SKIN:  Normal skin turgor, no lesions/rashes   HEENT: moist mucous membranes, no rhinorrhea, photosensitivity is present, sinuses are nontender to palpation, tympanic membranes are normal bilaterally  NECK: Normal, without lymphadenopathy  CV: RRR, no M/G/R   LUNGS: CTAB  ABDOMEN: S&NT, no masses, fetal heart tones auscultated in the 140s  EXTREMITY: no edema   NEURO: no gross deficits

## 2021-06-20 NOTE — PROGRESS NOTES
SUBJECTIVE:  Tonya Thayer is a 22 y.o. female.  She is not feeling well. She is now having issues sleeping. She has had some restlessness in her right leg, sometimes her left leg is restless as well.     Headaches are the same, not worse. Her dizziness is getting worse. She is dizzy when she stands up more than 10 minutes at a time, when she stands up. SHe was dizzy after going around the block a few days ago. She is quite thirsty.     Swelling and vision are stable. No ruq pain.   Chief Complaint   Patient presents with     Routine Prenatal Visit     34 weeks. Patient has been feeling tired, troubles staying asleep.        OBJECTIVE:   /70 (Patient Site: Right Arm, Patient Position: Sitting, Cuff Size: Adult Regular)  Wt 180 lb 11.2 oz (82 kg)  LMP 12/15/2017 (Exact Date)  Breastfeeding? No  BMI 32.01 kg/m2   Please see prenatal data for fundal height and fetal HR.     ASSESSMENT/PLAN:   Tonya Thayer is a 22 y.o. female  who presents for prenatal check.  Will check some basic labs to see if there is worsening of her anemia with the dizziness and restless legs. Will have her start on two times daily iron supplement

## 2021-06-20 NOTE — PROGRESS NOTES
SUBJECTIVE:  Tonya Thayer is a 22 y.o. female.  She feels awful all the time now. She is taking tylenol, lays down and drinks a lot of water. She does still have light and sound sensitivity, still nauseated, feels sick and dizzy all the time.  The headaches have not changed, the quality is the same, they are just not going away    Baby is moving well. Swelling is stable, no ruq pain.   Chief Complaint   Patient presents with     Routine Prenatal Visit     32 weeks.      [headaches, vision changes, edema, abdominal pain, cramping, BH] [fluid leaking, discharge]    OBJECTIVE:   /60  Wt 175 lb (79.4 kg)  LMP 12/15/2017 (Exact Date)  Breastfeeding? No  BMI 31 kg/m2   Please see prenatal data for fundal height and fetal HR.     ASSESSMENT/PLAN:   Tonya Thayer is a 22 y.o. female  who presents for prenatal check.   Will have her trial Fioricet rather than Tylenol with codeine to see if this helps with her headaches.  In the next few days if things are persisting we discussed the addition of verapamil to see if this helps on a daily basis.  I will follow-up on her neurology appointment as well as she has not gotten a phone call yet.  As she is measuring slightly small for dates I will order a growth ultrasound as well to monitor baby's growth.  Follow-up in 2 weeks

## 2021-06-20 NOTE — PROGRESS NOTES
SUBJECTIVE:  Tonya Thayer is a 22 y.o. female.  She is more stuffy again, but otherwise feeling ok.  Headaches have been stable without recent migraine, she has no changes in her vision, no right upper quadrant pain and her swelling is stable as well.  Baby is moving well.    She is starting her anti-viral medication again, started feeling uncomfortable a few days ago but she started having more itching again yesterday.   Chief Complaint   Patient presents with     Routine Prenatal Visit     36 weeks.      [headaches, vision changes, edema, abdominal pain, cramping, BH] [fluid leaking, discharge]    OBJECTIVE:   /64 (Patient Site: Right Arm, Patient Position: Sitting, Cuff Size: Adult Regular)  Temp 97.7  F (36.5  C) (Oral)   Wt 185 lb 8 oz (84.1 kg)  LMP 12/15/2017 (Exact Date)  Breastfeeding? No  BMI 32.86 kg/m2   Please see prenatal data for fundal height and fetal HR.     Area of erythema bilateral inguinal creases with satellite lesions    ASSESSMENT/PLAN:   Tonya Thayer is a 22 y.o. female  who presents for prenatal check. Patient is doing well.    Will watch the stuffiness for now, ua today is normal.   Given the area of irritation did check another herpes swab, although the area looks more yeasty than anything. Will great with miconazole, consider starting daily acyclovir as well and f/u in one week  Due to the outbreak, did defer internal exam today

## 2021-06-20 NOTE — PROGRESS NOTES
ASSESSMENT/PLAN:       1. URI (upper respiratory infection)  -Discussed with the patient that this is most likely viral in nature and as she is currently afebrile for now I would continue to monitor.  Given her splenectomy status however I did provide her with azithromycin for her to start taking in the next day or 2 if she does start obtaining a fever or her symptoms worsen.  She understands that she should follow-up over the weekend if things are not starting to improve or if they worsen.  - azithromycin (ZITHROMAX Z-SAMANTHA) 250 MG tablet; Take 2 tablets day one, then 1 tablet Qday for 4 days  Dispense: 6 tablet; Refill: 0        Michelle Medina MD      PROGRESS NOTE   9/14/2018    SUBJECTIVE:  Tonya Thayer is a 22 y.o. female  who presents for not feeling well.     She has been feeling unwell in the last two days. She has had chills, sweats, and did have a sore throat yesterday that is better today. Ear pain yesterday that is better today. Her mother in law and brother in law are sick as well. She is clammy all the time as well. She doesn't have an appetite as well. She had toast yesterday and mac and cheese, that was it. She had fruit this morning.     She is status post a splenectomy secondary to hereditary spherocytosis, and is 35 weeks pregnant.  Chief Complaint   Patient presents with     Chills     Since yesterday morning, patient has been having body chills to body sweats. She also has been having a loss of appetite, a sore throat in the mornings and nasal congestion.          Patient Active Problem List   Diagnosis     Hereditary Hemolytic Spherocytosis     Allergic Rhinitis     Asplenia     Elevated liver enzymes     Panic disorder     Anxiety     Thrombocytosis (H)     ZION (generalized anxiety disorder)     Nonintractable migraine       Current Outpatient Prescriptions   Medication Sig Dispense Refill     acetaminophen (TYLENOL) 325 MG tablet Take 650 mg by mouth every 6 (six) hours as  needed for pain.       acetaminophen-codeine (TYLENOL #3) 300-30 mg per tablet Take 1-2 tablets by mouth every 6 (six) hours as needed for pain. 20 tablet 0     butalbital-acetaminophen-caffeine (FIORICET, ESGIC) -40 mg per tablet Take 1-2 tablets by mouth every 6 (six) hours as needed for headaches. 10 tablet 0     ferrous sulfate 324 mg (65 mg iron) TbEC Take 1 tablet by mouth 2 (two) times a day with meals. 60 tablet 1     hydrOXYzine pamoate (VISTARIL) 50 MG capsule Take 1 capsule (50 mg total) by mouth 3 (three) times a day as needed for anxiety. 30 capsule 0     LORazepam (ATIVAN) 1 MG tablet Take 1 tablet (1 mg total) by mouth 2 (two) times a day as needed for anxiety. 5 tablet 0     ondansetron (ZOFRAN) 4 MG tablet Take 1 tablet (4 mg total) by mouth every 6 (six) hours as needed for nausea. 30 tablet 1     prenatal no122/iron/folic acid (PRENATAL MULTI ORAL) Take by mouth.       ranitidine (ZANTAC) 150 MG tablet Take 1 tablet (150 mg total) by mouth 2 (two) times a day as needed for heartburn. 60 tablet 2     sertraline (ZOLOFT) 100 MG tablet Take 1.5 tablets (150 mg total) by mouth daily. 45 tablet 5     azithromycin (ZITHROMAX Z-SAMANTHA) 250 MG tablet Take 2 tablets day one, then 1 tablet Qday for 4 days 6 tablet 0     No current facility-administered medications for this visit.        History   Smoking Status     Former Smoker     Packs/day: 0.50     Years: 3.00     Quit date: 4/27/2017   Smokeless Tobacco     Never Used           OBJECTIVE:        Recent Results (from the past 240 hour(s))   Comprehensive Metabolic Panel   Result Value Ref Range    Sodium 137 136 - 145 mmol/L    Potassium 4.5 3.5 - 5.0 mmol/L    Chloride 105 98 - 107 mmol/L    CO2 24 22 - 31 mmol/L    Anion Gap, Calculation 8 5 - 18 mmol/L    Glucose 66 (L) 70 - 125 mg/dL    BUN 5 (L) 8 - 22 mg/dL    Creatinine 0.53 (L) 0.60 - 1.10 mg/dL    GFR MDRD Af Amer >60 >60 mL/min/1.73m2    GFR MDRD Non Af Amer >60 >60 mL/min/1.73m2     Bilirubin, Total 0.6 0.0 - 1.0 mg/dL    Calcium 8.6 8.5 - 10.5 mg/dL    Protein, Total 6.5 6.0 - 8.0 g/dL    Albumin 2.6 (L) 3.5 - 5.0 g/dL    Alkaline Phosphatase 131 (H) 45 - 120 U/L    AST 12 0 - 40 U/L    ALT <9 0 - 45 U/L   Uric Acid   Result Value Ref Range    Uric Acid 3.5 2.0 - 7.5 mg/dL   INR   Result Value Ref Range    INR 1.00 0.90 - 1.10   APTT(PTT)   Result Value Ref Range    PTT 29 24 - 37 seconds   HM1 (CBC with Diff)   Result Value Ref Range    WBC 16.3 (H) 4.0 - 11.0 thou/uL    RBC 3.72 (L) 3.80 - 5.40 mill/uL    Hemoglobin 10.6 (L) 12.0 - 16.0 g/dL    Hematocrit 31.0 (L) 35.0 - 47.0 %    MCV 83 80 - 100 fL    MCH 28.5 27.0 - 34.0 pg    MCHC 34.2 32.0 - 36.0 g/dL    RDW 15.2 (H) 11.0 - 14.5 %    Platelets 657 (H) 140 - 440 thou/uL    MPV 10.6 8.5 - 12.5 fL    Neutrophils % 73 (H) 50 - 70 %    Lymphocytes % 18 (L) 20 - 40 %    Monocytes % 8 2 - 10 %    Eosinophils % 0 0 - 6 %    Basophils % 0 0 - 2 %    Neutrophils Absolute 11.7 (H) 2.0 - 7.7 thou/uL    Lymphocytes Absolute 3.0 0.8 - 4.4 thou/uL    Monocytes Absolute 1.3 (H) 0.0 - 0.9 thou/uL    Eosinophils Absolute 0.1 0.0 - 0.4 thou/uL    Basophils Absolute 0.0 0.0 - 0.2 thou/uL       Vitals:    09/14/18 1059   BP: 120/64   Patient Site: Left Arm   Patient Position: Sitting   Cuff Size: Adult Regular   Pulse: 88   Temp: 98.4  F (36.9  C)   Weight: 178 lb 12.8 oz (81.1 kg)     Weight: 178 lb 12.8 oz (81.1 kg)        Physical Exam:  GENERAL APPEARANCE: A&A, NAD, well hydrated, well nourished  SKIN:  Normal skin turgor, no lesions/rashes   HEENT: moist mucous membranes, no rhinorrhea, pharynx is nonerythematous, tympanic membrane's are clear bilaterally, sinuses are nontender to palpation  NECK: Normal, without lymphadenopathy  CV: RRR, no M/G/R   LUNGS: CTAB  ABDOMEN: S&NT, no masses, gravid, heart tones are in the 140s  EXTREMITY: no edema   NEURO: no gross deficits

## 2021-06-20 NOTE — PROGRESS NOTES
SUBJECTIVE:  Tonya Thayer is a 22 y.o. female.  She was in the hospital for hand swelling.     She does have a chipped tooth. She has had stable headaches. Seh has had no blurry vision, ruq pain. Swelling is stable. Baby is moving well. She is not having as many contractions.   Chief Complaint   Patient presents with     Routine Prenatal Visit     38 weeks, 6 days.        OBJECTIVE:   /80 (Patient Site: Right Arm, Patient Position: Sitting)  Wt 184 lb 9.6 oz (83.7 kg)  LMP 12/15/2017 (Exact Date)  Breastfeeding? No  BMI 32.7 kg/m2   Please see prenatal data for fundal height and fetal HR.     ASSESSMENT/PLAN:   Tonya Thayer is a 22 y.o. female  who presents for prenatal check. Patient is doing well.    Given her broken tooth this could affect her pregnancy and social issues as well, discussed elective induction. OB has okay'ed it with the risk of infection.   IOL set up for tomorrow with plan for cytotec, possible cervidil and then pitocin.

## 2021-06-20 NOTE — PROGRESS NOTES
SUBJECTIVE:  Tonya Thayer is a 22 y.o. female.  She is doing ok. She is not sleeping well due to back pain. She is using her pregnancy pillow, heat and ice.     She has no blurry vision, ruq pain, swelling is stable, headaches are better. Baby is moving well. Genital rash is better.   Chief Complaint   Patient presents with     Routine Prenatal Visit     37 weeks.      Insomnia     Patient has been having troubles with falling asleep and staying asleep.        OBJECTIVE:   /70 (Patient Site: Right Arm, Patient Position: Sitting, Cuff Size: Adult Regular)  Wt 182 lb 12.8 oz (82.9 kg)  LMP 12/15/2017 (Exact Date)  Breastfeeding? No  BMI 32.38 kg/m2   Please see prenatal data for fundal height and fetal HR.     ASSESSMENT/PLAN:   Tonya Thayer is a 22 y.o. female  who presents for prenatal check. Patient is doing well.    F/u in one week  Work on heat and ice for her back

## 2021-06-20 NOTE — PROGRESS NOTES
SUBJECTIVE:  Tonya Thayer is a 22 y.o. female.  She is doing well. She does have stable headaches, but is not having any other symptoms. Baby is moving ok as well.     Chief Complaint   Patient presents with     Routine Prenatal Visit     38 weeks.        OBJECTIVE:   /84 (Patient Site: Right Arm, Patient Position: Sitting, Cuff Size: Adult Large)  Wt 184 lb 9.6 oz (83.7 kg)  LMP 12/15/2017 (Exact Date)  Breastfeeding? No  BMI 32.7 kg/m2   Please see prenatal data for fundal height and fetal HR.     ASSESSMENT/PLAN:   Tonya Thayer is a 22 y.o. female  who presents for prenatal check. Patient is doing well.    With slight elevation in blood pressure and trace protein will update labs today as well.

## 2021-06-20 NOTE — ANESTHESIA POSTPROCEDURE EVALUATION
Patient: Tonya Thayer  * No procedures listed *  Anesthesia type: epidural    Patient location: Labor and Delivery  Last vitals:   Vitals:    10/11/18 1912   BP: 141/78   Pulse: (!) 104   Resp:    Temp:    SpO2:      Post vital signs: stable  Level of consciousness: awake and return to baseline  Post-anesthesia pain: pain controlled  Post-anesthesia nausea and vomiting: no  Pulmonary: unassisted, return to baseline  Cardiovascular: stable and blood pressure at baseline  Hydration: adequate  Anesthetic events: no  Epidural resolved, no back pain, no apparent neurologic complications.    QCDR Measures:  ASA# 11 - Maria Fernanda-op Cardiac Arrest: ASA11B - Patient did NOT experience unanticipated cardiac arrest  ASA# 12 - Maria Fernanda-op Mortality Rate: ASA12B - Patient did NOT die  ASA# 13 - PACU Re-Intubation Rate: NA - No ETT / LMA used for case  ASA# 10 - Composite Anes Safety: ASA10A - No serious adverse event    Additional Notes:

## 2021-06-20 NOTE — ANESTHESIA PREPROCEDURE EVALUATION
Anesthesia Evaluation      Patient summary reviewed   No history of anesthetic complications     Airway   Mallampati: II  Neck ROM: full   Pulmonary - negative ROS                          Cardiovascular - negative ROS   Neuro/Psych - negative ROS     Endo/Other    (+) obesity, pregnant     GI/Hepatic/Renal - negative ROS      Other findings: Shperocytosis      Dental - normal exam                        Anesthesia Plan  Planned anesthetic: epidural    ASA 2     Anesthetic plan and risks discussed with: patient    Post-op plan: routine recovery

## 2021-06-20 NOTE — ANESTHESIA PROCEDURE NOTES
Epidural Block    Patient location during procedure: OB  Time Called: 10/11/2018 2:30 PM  Reason for Block:labor epidural  Staffing:  Performing  Anesthesiologist: CAROLINE BLUE  Preanesthetic Checklist  Completed: patient identified, risks, benefits, and alternatives discussed, timeout performed, consent obtained, at patient's request, airway assessed, oxygen available, suction available, emergency drugs available and hand hygiene performed  Procedure  Patient position: sitting  Prep: ChloraPrep and site prepped and draped  Patient monitoring: continuous pulse oximetry, heart rate and blood pressure  Approach: midline  Location: L3-L4  Injection technique: JOSH air  Number of Attempts:1  Needle  Needle type: Jackie   Needle gauge: 18 G     Catheter in Space: 5  Assessment  Sensory level: T10  No complications

## 2021-06-21 NOTE — LETTER
Letter by Lawanda Dean CHW at      Author: Lawanda Dean CHW Service: -- Author Type: --    Filed:  Encounter Date: 12/10/2020 Status: (Other)       CARE COORDINATION  [address]  [facility] [address]  [facility] [place] [address]     December 11, 2020    Tonya Thayer  [facility] [address]      Dear Tonya,    I am a clinic community health worker who works with Michelle Medina MD at [facility]. I have been trying to reach you recently to introduce Clinic Care Coordination and to see if there was anything I could assist you with.  Below is a description of clinic care coordination and how I can further assist you.      The clinic care coordination team is made up of a registered nurse,  and community health worker who understand the health care system. The goal of clinic care coordination is to help you manage your health and improve access to the health care system in the most efficient manner. The team can assist you in meeting your health care goals by providing education, coordinating services, strengthening the communication among your providers and supporting you with any resource needs.    Please feel free to contact the Community Health Worker at [phone] with any questions or concerns. We are focused on providing you with the highest-quality healthcare experience possible and that all starts with you.     Sincerely,     Lawanda

## 2021-06-21 NOTE — LETTER
Letter by Judith Ozuna MD at      Author: Judith Ozuna MD Service: -- Author Type: --    Filed:  Encounter Date: 12/21/2020 Status: (Other)       Dear Tonya Thayer    Thank you for choosing [facility] for your care.  We are committed to providing you with the highest quality and compassionate healthcare services.  The following information pertains to your first appointment with our clinic.    Date/Time of appointment:  1/14/2021 1:45pm      Note: Please arrive 30 minutes prior to your appointment time.  This allows time to complete forms, possible labs and nursing assessment.    Name of your Physician:  Judith Ozuna MD    What to bring to your appointment:    Completed Patient History/Initial Nursing Assessment and Medication/Allergy List (these forms were sent to you).    Any paperwork or films from your physician that we have asked you to bring.    Your current insurance card(s).    Parking:    Please refer to the map included to direct you to [facility].    You can park in any visitor/patient parking area you choose. There is no charge for parking at [facility].     Enter the hospital at the front/main entrance.      Please check in with our  representatives who will escort you to the clinic located in [address] of the [facility].    We hope these instructions are helpful to you.  If you have any questions or concerns, please call us at [phone].  It is our pleasure to assist you.    Warm Regards,    [phone]

## 2021-06-21 NOTE — LETTER
Letter by Judith Ozuna MD at      Author: Judith Ozuna MD Service: -- Author Type: --    Filed:  Encounter Date: 2/2/2021 Status: (Other)         Tonya Thayer  [address] [place] [id number]                 February 2, 2021       Dear Ms. Thayer,    The blood test looking for blood disease of bone marrow disease came back negative.  I believe     Please call with questions or contact us using [employer].      Sincerely,        Electronically signed by Judith Ozuna MD

## 2021-06-22 NOTE — PROGRESS NOTES
ASSESSMENT/PLAN:       1. Anxiety  -Generally she is stable, and slightly worse anxiety right now.  He did send in a prescription for lorazepam to use as needed over the next few weeks, and she will plan on staying on the 37-1/2 mg dosage of venlafaxine for now.  She will let me know how she is doing in the next few weeks and we can certainly increase to 75 mg at that time or if she is continuing to have issues with increased anxiety looks like she has been on citalopram in the past and we may want to trial that instead.  She will otherwise follow-up here in approximately 2 months for recheck.  - LORazepam (ATIVAN) 1 MG tablet; Take 1 tablet (1 mg total) by mouth 2 (two) times a day as needed for anxiety.  Dispense: 20 tablet; Refill: 0      Return in about 2 months (around 2/10/2019) for recheck.      Michelle Medina MD      PROGRESS NOTE   12/10/2018    SUBJECTIVE:  Tonya Thayer is a 23 y.o. female  who presents for follow up.     She hasn't been doing much because she hasn't had motivation. She was at a hockey game and felt floaty, dizzy and like she was going to pass out and was having a panic attack. She did need to take a few lorazepam and that didn't help a ton and then she took another one and then went to sleep.     She has had panic attacks daily since then. She has had help from her sister with her daughter and that has helped some. She does have a hard time relaxing.   Chief Complaint   Patient presents with     Medication Management     Patient is here today to review current medications.      Anxiety     Patient is feeling more anxious and shaky, loss of appetite.          Patient Active Problem List   Diagnosis     Hereditary Hemolytic Spherocytosis     Allergic Rhinitis     Asplenia     Elevated liver enzymes     Panic disorder     Thrombocytosis (H)     ZION (generalized anxiety disorder)     Nonintractable migraine       Current Outpatient Medications   Medication Sig Dispense  Refill     acetaminophen (TYLENOL) 325 MG tablet Take 1-2 tablets (325-650 mg total) by mouth every 4 (four) hours as needed.  0     butalbital-acetaminophen-caffeine (FIORICET, ESGIC) -40 mg per tablet Take 1-2 tablets by mouth every 6 (six) hours as needed for headaches. 10 tablet 0     hydrOXYzine pamoate (VISTARIL) 50 MG capsule Take 1 capsule (50 mg total) by mouth 3 (three) times a day as needed for anxiety. 30 capsule 0     ibuprofen (ADVIL,MOTRIN) 800 MG tablet Take 1 tablet (800 mg total) by mouth every 8 (eight) hours. 90 tablet 0     venlafaxine (EFFEXOR XR) 37.5 MG 24 hr capsule Take 1 capsule (37.5 mg total) by mouth daily for 14 days, THEN 2 capsules (75 mg total) daily. 60 capsule 1     LORazepam (ATIVAN) 1 MG tablet Take 1 tablet (1 mg total) by mouth 2 (two) times a day as needed for anxiety. 20 tablet 0     No current facility-administered medications for this visit.        Social History     Tobacco Use   Smoking Status Former Smoker     Packs/day: 0.50     Years: 3.00     Pack years: 1.50     Last attempt to quit: 2017     Years since quittin.6   Smokeless Tobacco Never Used           OBJECTIVE:        No results found for this or any previous visit (from the past 240 hour(s)).    Vitals:    12/10/18 1316   BP: 136/80   Patient Site: Left Arm   Patient Position: Sitting   Cuff Size: Adult Regular   Pulse: 88   SpO2: 96%   Weight: 150 lb 4.8 oz (68.2 kg)     Weight: 150 lb 4.8 oz (68.2 kg)          Physical Exam:  GENERAL APPEARANCE: A&A, NAD, well hydrated, well nourished  SKIN:  Normal skin turgor, no lesions/rashes   HEENT: moist mucous membranes, no rhinorrhea  Psych: Her affect is somewhat improved from prior, she makes normal eye contact, her thought process and speech pattern are normal, she is well-dressed and groomed, not tearful today, actively engaged and bonded with baby  EXTREMITY: no edema   NEURO: no gross deficits

## 2021-06-22 NOTE — PROGRESS NOTES
Assessment:        Tonya Thayer is a 23 y.o. female here for postpartum exam at 6 weeks postpartum. Pap smear done at today's visit.   1. Postpartum care and examination     2. ZION (generalized anxiety disorder)  venlafaxine (EFFEXOR XR) 37.5 MG 24 hr capsule   . .    Plan:     1. Postpartum care and examination  -Generally doing ok with some post partum depression. She does not desire birth control at this time as she is not interested in intercourse with her partner and she has had ill effects from birth control in the past.  We did discuss the possibility of a nonhormonal IUD if she is having trouble with condoms which she will consider.    2. ZION (generalized anxiety disorder)  -Increasing anxiety and depression symptoms since delivery.  She was on Zoloft for period of time prior to delivery but did discontinue this with concern of headaches.  Given this, she will restart venlafaxine as she was on this for a few years with fairly good results.  - venlafaxine (EFFEXOR XR) 37.5 MG 24 hr capsule; Take 1 capsule (37.5 mg total) by mouth daily for 14 days, THEN 2 capsules (75 mg total) daily.  Dispense: 60 capsule; Refill: 1       Subjective:       Tonya Thayer is a 23 y.o. female who presents for a postpartum visit. She is 6 weeks postpartum following a spontaneous vaginal delivery. I have fully reviewed the prenatal and intrapartum course. The delivery was at 39-2 gestational weeks. Outcome: spontaneous vaginal delivery. Postpartum course has been uncomplicated. Baby's course has been uncomplicated. Baby is feeding by bottle - Similac. Bled for  3-4 weeks postpartum.  Currently bleeding  no bleeding. Bowel function is normal. Bladder function is normal. Patient has not resumed intercourse. Contraception method is abstinence. Postpartum depression screening score:     EPDS Total Score: 21 (11/28/2018  1:53 PM)  PHQ-2 Total Score: 3 (2/27/2018 11:00 AM)  PHQ-9 Total Score: 18 (2/27/2018 11:00 AM)    She  has found that her mood has been a lot worse. Moved out of boyfriends house and is back with her mother. She is crying all the time. She does feel that this happens daily.      She is still having headaches now,she does get them through the day, normally tylenol works and does still have them daily. She has never been on anything else but the sertraline.     The following portions of the patient's history were reviewed and updated as appropriate: allergies, current medications and problem list    Review of Systems  General:  Denies problem  Eyes: Denies problem  Ears/Nose/Throat: Denies problem  Cardiovascular: Denies problem  Respiratory:  Denies problem  Gastrointestinal:  Denies problem, Genitourinary: Denies problem  Musculoskeletal:  Denies problem  Skin: Denies problem  Neurologic: Denies problem  Psychiatric: Denies problem  Endocrine: Denies problem  Heme/Lymphatic: Denies problem   Allergic/Immunologic: Denies problem          Objective:         Vitals:    11/28/18 1351   BP: 112/70   Pulse: 95   SpO2: 98%   Weight: 150 lb (68 kg)       Physical Exam:  General Appearance: Alert, cooperative, no distress, appears stated age  Head: Normocephalic, without obvious abnormality, atraumatic  Eyes: PERRL, conjunctiva/corneas clear, EOM's intact  Ears: Normal TM's and external ear canals, both ears  Nose: Nares normal, septum midline,mucosa normal, no drainage  Throat: Lips, mucosa, and tongue normal; teeth and gums normal  Neck: Supple, symmetrical, trachea midline, no adenopathy;  thyroid: not enlarged, symmetric, no tenderness/mass/nodules  Back: Symmetric, no curvature, ROM normal, no CVA tenderness  Lungs: Clear to auscultation bilaterally, respirations unlabored  Breasts: No breast masses, tenderness, asymmetry, or nipple discharge.  Heart: Regular rate and rhythm, S1 and S2 normal, no murmur, rub, or gallop,   Abdomen: Soft, non-tender, bowel sounds active all four quadrants,  no masses, no  organomegaly  Pelvic:Normally developed genitalia with no external lesions or eruptions. Vagina and cervix show no lesions, inflammation, discharge or tenderness. No cystocele, No rectocele. Uterus normal.  No adnexal mass or tenderness.  Extremities: Extremities normal, atraumatic, no cyanosis or edema  Skin: Skin color, texture, turgor normal, no rashes or lesions  Lymph nodes: Cervical, supraclavicular, and axillary nodes normal  Neurologic: Normal

## 2021-06-23 ENCOUNTER — COMMUNICATION - HEALTHEAST (OUTPATIENT)
Dept: FAMILY MEDICINE | Facility: CLINIC | Age: 26
End: 2021-06-23

## 2021-06-23 NOTE — PROGRESS NOTES
Chief Complaint   Patient presents with     Nasal Congestion     Symptoms all started Sunday morning and getting worse. Has tried otc medications but not helping.      Sore Throat     Cough     Generalized Body Aches     HPI: Patient presents today with 3 days of sick symptoms including a sore throat, cough, body aches, nasal congestion, and slight headaches.  She feels some fullness in her ears.  She notes some discomfort along her maxillary sinuses bilaterally.  When she blows her nose the mucus is white/green.  She is tolerating her secretions well and denies oral pain.  Her cough is sometimes productive getting up clear sputum.  Her work includes being a .  The patient has a 3-month-old at home she denies fever and chills.  Non-smoker.  No history of asthma.    ROS:Review of Systems - History obtained from the patient  General ROS: positive for  - fatigue  Ophthalmic ROS: negative  ENT ROS: positive for - nasal congestion, nasal discharge, sinus pain and sore throat  Allergy and Immunology ROS: negative  Respiratory ROS: positive for - cough  Cardiovascular ROS: negative    SH: The Patient's  reports that she quit smoking about 20 months ago. She has a 1.50 pack-year smoking history. she has never used smokeless tobacco. She reports that she drinks alcohol. She reports that she does not use drugs.      FH: The Patient's family history includes Clotting disorder in her brother.     Meds:    Current Outpatient Medications on File Prior to Visit   Medication Sig Dispense Refill     acetaminophen (TYLENOL) 325 MG tablet Take 1-2 tablets (325-650 mg total) by mouth every 4 (four) hours as needed.  0     escitalopram oxalate (LEXAPRO) 10 MG tablet Take 0.5 tablets (5 mg total) by mouth daily for 7 days, THEN 1 tablet (10 mg total) daily. 30 tablet 2     ibuprofen (ADVIL,MOTRIN) 800 MG tablet Take 1 tablet (800 mg total) by mouth every 8 (eight) hours. 90 tablet 0     butalbital-acetaminophen-caffeine  "(FIORICET, ESGIC) -40 mg per tablet Take 1-2 tablets by mouth every 6 (six) hours as needed for headaches. 10 tablet 0     hydrOXYzine pamoate (VISTARIL) 50 MG capsule Take 1 capsule (50 mg total) by mouth 3 (three) times a day as needed for anxiety. 30 capsule 0     LORazepam (ATIVAN) 1 MG tablet Take 1 tablet (1 mg total) by mouth 2 (two) times a day as needed for anxiety. 20 tablet 0     No current facility-administered medications on file prior to visit.        O:  /68   Pulse 83   Temp 98.7  F (37.1  C) (Oral)   Ht 5' 3\" (1.6 m)   Wt 155 lb (70.3 kg)   LMP 12/23/2018 (Exact Date)   SpO2 96%   BMI 27.46 kg/m      Physical Examination:   General appearance - alert, well appearing, and in no distress  Mental status - alert, oriented to person, place, and time  Eyes - pupils equal and reactive, extraocular eye movements intact  Ears - bilateral TM's and external ear canals normal  Nose - normal and patent, no erythema, discharge or polyps  Mouth - mucous membranes moist, pharynx normal without lesions  Neck - supple, no significant adenopathy  Lymphatics - no palpable lymphadenopathy, no hepatosplenomegaly  Chest - clear to auscultation, no wheezes, rales or rhonchi, symmetric air entry  Heart - normal rate and regular rhythm, S1 and S2 normal, no murmurs noted  Extremities - peripheral pulses normal, no pedal edema, no clubbing or cyanosis  Skin - normal coloration and turgor, no rashes, no suspicious skin lesions noted      A/P:     Problem List Items Addressed This Visit     None      Visit Diagnoses     Sore throat    -  Primary    Relevant Orders    Rapid Strep A Screen-Throat (Completed)    Group A Strep, RNA Direct Detection, Throat            1. Sore throat  Rapid strep negative.  Most likely viral in nature.  Encouraged hydration, warm fluids, warm salt water gargles, and saline nasal irrigation.  Await culture.  I did discuss with the patient's delayed antibiotic therapy can be started " if her sinus symptoms fail to improve by this time next week.  - Rapid Strep A Screen-Throat  - Group A Strep, RNA Direct Detection, Throat        Petros Winchester, CNP

## 2021-06-25 NOTE — TELEPHONE ENCOUNTER
Controlled Substance Refill Request  Medication Name:   Requested Prescriptions     Pending Prescriptions Disp Refills     LORazepam (ATIVAN) 1 MG tablet 60 tablet 0     Sig: TAKE 1-2 TABLETS BY MOUTH TWICE DAILY AS NEEDED FOR ANXIETY     Refused Prescriptions Disp Refills     escitalopram oxalate (LEXAPRO) 10 MG tablet 30 tablet 5     Sig: Take 1 tablet (10 mg total) by mouth daily.     Refused By: ADRIEL LIM     Reason for Refusal: Should already have refills on file     Date Last Fill: 3/17/21  Requested Pharmacy: [facility]  Submit electronically to pharmacy  Controlled Substance Agreement on file:   Encounter-Level CSA Scan Date - 01/24/2018:    Scan on 1/26/2018 10:23 AM: HE        Last office visit:  2/17/21

## 2021-06-25 NOTE — TELEPHONE ENCOUNTER
Per pt [employer] message- I did notify pt that refill request was sent to you.      Medication:Ativan- 3/17/2021  Lexapro-1/6/2021  Last Date Filled see above   Last appointment addressing medication use: 2/17/2021      Taken as prescribed from physician notes? YES    CSA in last year: NO    Random Utox in last year: NO  (if any of the above answer NO - schedule with PCP)     Opioids + benzodiazepines? YES  (if the above answer YES - schedule with PCP every 6 months)       All responses suggest: Refilling prescription

## 2021-06-26 NOTE — PROGRESS NOTES
Progress Notes by Marquise Fermin PA-C at 12/26/2017  1:00 PM     Author: Marquise Fermin PA-C Service: -- Author Type: Physician Assistant    Filed: 12/26/2017 10:13 PM Encounter Date: 12/26/2017 Status: Signed    : Marquise Fermin PA-C (Physician Assistant)       Assessment:      Influenza like syndrome      Plan:     1. Flu-like symptoms     2. Throat pain  Rapid Strep A Screen-Throat    Group A Strep, RNA Direct Detection, Throat    Influenza A/B Rapid Test    predniSONE (DELTASONE) 20 MG tablet         Patient Instructions     Suggested increased rest increased fluids and bedside humidification  Over-the-counter Tylenol for comfort.  Follow packaging directions  Over-the-counter throat lozenges with benzocaine such as Cepacol may be used if indicated and is not a choking hazard based on age.  Follow packaging directions.  Do not overuse the benzocaine as it will dry the throat and make it uncomfortable.  Follow-up if any complications      As a result of our visit today, here are the action plans for you:    1. Medication(s) to stop: There are no discontinued medications.    2. Medication(s) to start or change:   Medications Ordered   Medications   ? predniSONE (DELTASONE) 20 MG tablet     Sig: Take 2 tablets (40 mg total) by mouth daily for 5 days.     Dispense:  20 tablet     Refill:  0       3. Other instructions: Yes       Self-Care for Sore Throats  Sore throats happen for many reasons, such as colds, allergies, and infections caused by viruses or bacteria. In any case, your throat becomes red and sore. Your goal for self-care is to reduce your discomfort while giving your throat a chance to heal.    Moisten and soothe your throat  Tips include the following:    Try a sip of water first thing after waking up.    Keep your throat moist by drinking 6 or more glasses of clear liquids every day.    Run a cool-air humidifier in your room overnight.    Avoid cigarette smoke.     Suck on throat lozenges, cough  drops, hard candy, ice chips, or frozen fruit-juice bars. Use the sugar-free versions if your diet or medical condition requires them.  Gargle to ease irritation  Gargling every hour or 2 can ease irritation. Try gargling with 1 of these solutions:    1/4 teaspoon of salt in 1/2 cup of warm water    An over-the-counter anesthetic gargle  Use medicine for more relief  Over-the-counter medicine can reduce sore throat symptoms. Ask your pharmacist if you have questions about which medicine to use:    Ease pain with anesthetic sprays. Aspirin or an aspirin substitute also helps. Remember, never give aspirin to anyone 18 or younger, or if you are already taking blood thinners.     For sore throats caused by allergies, try antihistamines to block the allergic reaction.    Remember: unless a sore throat is caused by a bacterial infection, antibiotics wont help you.  Prevent future sore throats  Prevention tips include the following:    Stop smoking or reduce contact with secondhand smoke. Smoke irritates the tender throat lining.    Limit contact with pets and with allergy-causing substances, such as pollen and mold.    When youre around someone with a sore throat or cold, wash your hands often to keep viruses or bacteria from spreading.    Dont strain your vocal cords.  Call your healthcare provider  Contact your healthcare provider if you have:    A temperature over 101 F (38.3 C)    White spots on the throat    Great difficulty swallowing    Trouble breathing    A skin rash    Recent exposure to someone else with strep bacteria    Severe hoarseness and swollen glands in the neck or jaw   Date Last Reviewed: 8/1/2016 2000-2016 [employer]. [address] [place] [address], [place] [address]. All rights reserved. This information is not intended as a substitute for professional medical care. Always follow your healthcare professional's instructions.                    Subjective:       Tonya Thayer is a 22  y.o. female who presents for evaluation of influenza like symptoms. Symptoms include chills, coryza, dry cough, headache, myalgias, pain while swallowing, sore throat, Fatigue arthralgias anorexia and acute onset and fever and have been present for 1 day. She has tried to alleviate the symptoms with Nothing with minimal relief. High risk factors for influenza complications: none.    The following portions of the patient's history were reviewed and updated as appropriate: allergies, current medications, past family history, past medical history, past social history, past surgical history and problem list.    Review of Systems  Pertinent items are noted in HPI.       Objective:      /64 (Patient Site: Right Arm, Patient Position: Sitting, Cuff Size: Adult Regular)  Pulse 78  Temp 98  F (36.7  C) (Oral)   Resp 16  Wt 151 lb (68.5 kg)  LMP 12/15/2017 (Exact Date)  SpO2 99%  Breastfeeding? No  BMI 25.92 kg/m2  General appearance: alert, appears stated age, cooperative and mild distress  Head: Normocephalic, without obvious abnormality, atraumatic  Eyes: negative  Ears: normal TM's and external ear canals both ears  Nose: Nares normal. Septum midline. Mucosa normal. No drainage or sinus tenderness.  Throat: abnormal findings: mild oropharyngeal erythema  Neck: no adenopathy and supple, symmetrical, trachea midline  Lungs: clear to auscultation bilaterally  Heart: regular rate and rhythm  Skin: Skin color, texture, turgor normal. No rashes or lesions

## 2021-07-03 NOTE — ADDENDUM NOTE
Addendum Note by Michelle Gastelum MD at 2/22/2019  5:26 PM     Author: Michelle Gastelum MD Service: -- Author Type: Physician    Filed: 2/22/2019  5:26 PM Encounter Date: 2/18/2019 Status: Signed    : Michelle Gastelum MD (Physician)    Addended by: MICHELLE GASTELUM on: 2/22/2019 05:26 PM        Modules accepted: Orders

## 2021-07-03 NOTE — ADDENDUM NOTE
Addendum Note by Michelle Gastelum MD at 7/20/2018  8:06 AM     Author: Michelle Gastelum MD Service: -- Author Type: Physician    Filed: 7/20/2018  8:06 AM Encounter Date: 6/9/2018 Status: Signed    : Michelle Gastelum MD (Physician)    Addended by: MICHELLE GASTELUM on: 7/20/2018 08:06 AM        Modules accepted: Orders

## 2021-07-03 NOTE — ADDENDUM NOTE
Addendum Note by Filippo Aguila CMA at 11/20/2017 12:52 PM     Author: Filippo Aguila CMA Service: -- Author Type: Certified Medical Assistant    Filed: 11/20/2017 12:52 PM Encounter Date: 11/13/2017 Status: Signed    : Filippo Aguila CMA (Certified Medical Assistant)    Addended by: FILIPPO AGUILA on: 11/20/2017 12:52 PM        Modules accepted: Orders

## 2021-07-03 NOTE — ADDENDUM NOTE
Addendum Note by Michelle Gastelum MD at 12/29/2020  1:44 PM     Author: Michelle Gastelum MD Service: -- Author Type: Physician    Filed: 12/29/2020  1:44 PM Encounter Date: 12/29/2020 Status: Signed    : Michelle Gastelum MD (Physician)    Addended by: MICHELLE GASTELUM on: 12/29/2020 01:44 PM        Modules accepted: Orders

## 2021-07-03 NOTE — ADDENDUM NOTE
Addendum Note by Monisha Smith at 4/3/2018 10:50 AM     Author: Monisha Smith Service: -- Author Type:     Filed: 4/3/2018 10:50 AM Encounter Date: 4/2/2018 Status: Signed    : Monisha Smith ()    Addended by: MONISHA SMITH on: 4/3/2018 10:50 AM        Modules accepted: Orders

## 2021-07-03 NOTE — ADDENDUM NOTE
Addendum Note by Jose Winchester CNP at 5/28/2019  1:35 PM     Author: Jose Winchester CNP Service: -- Author Type: Nurse Practitioner    Filed: 5/28/2019  1:35 PM Encounter Date: 5/25/2019 Status: Signed    : Jose Winchester CNP (Nurse Practitioner)    Addended by: JOSE WINCHESTER on: 5/28/2019 01:35 PM        Modules accepted: Orders

## 2021-07-03 NOTE — ADDENDUM NOTE
Addendum Note by Annabella Vitale CMA at 5/28/2019  1:54 PM     Author: Annabella Vitale CMA Service: -- Author Type: Certified Medical Assistant    Filed: 5/28/2019  1:54 PM Encounter Date: 5/25/2019 Status: Signed    : Annabella Vitale CMA (Certified Medical Assistant)    Addended by: ANNABELLA VITALE on: 5/28/2019 01:54 PM        Modules accepted: Orders

## 2021-07-03 NOTE — ADDENDUM NOTE
Addendum Note by Michelle Gastelum MD at 11/28/2018  2:00 PM     Author: Michelle Gastelum MD Service: -- Author Type: Physician    Filed: 11/28/2018  3:18 PM Encounter Date: 11/28/2018 Status: Signed    : Michelle Gastelum MD (Physician)    Addended by: MICHELLE GASTELUM on: 11/28/2018 03:18 PM        Modules accepted: Orders

## 2021-07-06 ENCOUNTER — AMBULATORY - HEALTHEAST (OUTPATIENT)
Dept: CARE COORDINATION | Facility: CLINIC | Age: 26
End: 2021-07-06

## 2021-07-06 DIAGNOSIS — F41.1 GAD (GENERALIZED ANXIETY DISORDER): ICD-10-CM

## 2021-07-14 PROBLEM — Z34.90 PREGNANT: Status: RESOLVED | Noted: 2018-10-09 | Resolved: 2018-11-28

## 2021-08-03 PROBLEM — R65.10 SIRS (SYSTEMIC INFLAMMATORY RESPONSE SYNDROME) (H): Status: RESOLVED | Noted: 2019-10-12 | Resolved: 2020-01-18

## 2021-08-03 PROBLEM — A41.9 SEPSIS (H): Status: RESOLVED | Noted: 2017-06-17 | Resolved: 2017-08-02

## 2021-08-06 ENCOUNTER — OFFICE VISIT (OUTPATIENT)
Dept: FAMILY MEDICINE | Facility: CLINIC | Age: 26
End: 2021-08-06
Payer: COMMERCIAL

## 2021-08-06 VITALS
BODY MASS INDEX: 30.04 KG/M2 | HEART RATE: 84 BPM | DIASTOLIC BLOOD PRESSURE: 78 MMHG | SYSTOLIC BLOOD PRESSURE: 124 MMHG | WEIGHT: 175 LBS | OXYGEN SATURATION: 97 %

## 2021-08-06 DIAGNOSIS — F32.1 MODERATE MAJOR DEPRESSION (H): ICD-10-CM

## 2021-08-06 DIAGNOSIS — F41.1 GAD (GENERALIZED ANXIETY DISORDER): Primary | ICD-10-CM

## 2021-08-06 PROCEDURE — 99213 OFFICE O/P EST LOW 20 MIN: CPT | Performed by: FAMILY MEDICINE

## 2021-08-06 RX ORDER — ALBUTEROL SULFATE 90 UG/1
2 AEROSOL, METERED RESPIRATORY (INHALATION)
COMMUNITY
Start: 2020-12-29 | End: 2021-10-18

## 2021-08-06 RX ORDER — LORAZEPAM 1 MG/1
TABLET ORAL
COMMUNITY
Start: 2021-05-28 | End: 2021-08-06

## 2021-08-06 RX ORDER — LORAZEPAM 1 MG/1
TABLET ORAL
Qty: 30 TABLET | Refills: 1 | Status: SHIPPED | OUTPATIENT
Start: 2021-08-06 | End: 2021-10-25

## 2021-08-06 RX ORDER — ESCITALOPRAM OXALATE 10 MG/1
10 TABLET ORAL
COMMUNITY
Start: 2021-06-03 | End: 2021-08-06

## 2021-08-06 RX ORDER — ESCITALOPRAM OXALATE 20 MG/1
20 TABLET ORAL DAILY
Qty: 30 TABLET | Refills: 1 | Status: SHIPPED | OUTPATIENT
Start: 2021-08-06 | End: 2021-11-02

## 2021-08-06 NOTE — PROGRESS NOTES
"                                   Assessment & Plan     ZION (generalized anxiety disorder)  -As she is still having panic attacks we discussed increasing her Lexapro which she is agreeable to.  We will go from 10 mg daily to 20 mg orally daily and if doing well she will follow up with me in 3 to 6 months for recheck.  She will call sooner if there are issues.  - escitalopram (LEXAPRO) 20 MG tablet; Take 1 tablet (20 mg) by mouth daily  - LORazepam (ATIVAN) 1 MG tablet; TAKE 1-2 TABLETS BY MOUTH TWICE DAILY AS NEEDED FOR ANXIETY    Moderate major depression (H)  -Per above         BMI:   Estimated body mass index is 30.04 kg/m  as calculated from the following:    Height as of 1/14/21: 1.626 m (5' 4\").    Weight as of this encounter: 79.4 kg (175 lb).   Weight management plan: Discussed healthy diet and exercise guidelines      No follow-ups on file.    Michelle Medina MD  [facility] CUCO Castaneda is a 25 year old who presents for the following health issues       HPI     She notes that she is tired all the time. She does also note more anxiety.  She has had more panic attacks as well although they are better than they had been.  She states now she is having the may be a few days a week where as before they were daily.  She is not sure she is resting super well.  She is currently taking 10 mg of Lexapro and denies any obvious side effects.      Review of Systems         Objective    /78   Pulse 84   Wt 79.4 kg (175 lb)   LMP 07/13/2021 (Approximate)   SpO2 97%   Breastfeeding No   BMI 30.04 kg/m    Body mass index is 30.04 kg/m .  Physical Exam     GENERAL: healthy, alert and no distress  MS: no gross musculoskeletal defects noted, no edema  PSYCH: mentation appears normal, affect flat, judgement and insight intact, appearance well groomed and eye contact normal, not tearful today           "

## 2021-08-07 ENCOUNTER — HEALTH MAINTENANCE LETTER (OUTPATIENT)
Age: 26
End: 2021-08-07

## 2021-08-09 PROBLEM — D75.839 THROMBOCYTOSIS: Status: ACTIVE | Noted: 2017-09-21

## 2021-08-09 PROBLEM — Q89.01 ASPLENIA: Status: ACTIVE | Noted: 2021-08-09

## 2021-08-09 PROBLEM — D58.0 HEREDITARY SPHEROCYTOSIS (H): Status: ACTIVE | Noted: 2021-08-09

## 2021-08-09 PROBLEM — G43.009 NONINTRACTABLE MIGRAINE: Status: ACTIVE | Noted: 2017-10-30

## 2021-08-09 PROBLEM — F41.0 PANIC DISORDER: Status: ACTIVE | Noted: 2017-06-21

## 2021-08-09 PROBLEM — J30.9 ALLERGIC RHINITIS: Status: ACTIVE | Noted: 2021-08-09

## 2021-08-09 RX ORDER — PROPRANOLOL HYDROCHLORIDE 10 MG/1
TABLET ORAL
COMMUNITY
Start: 2021-03-19 | End: 2021-10-18

## 2021-10-02 ENCOUNTER — HEALTH MAINTENANCE LETTER (OUTPATIENT)
Age: 26
End: 2021-10-02

## 2021-10-12 ENCOUNTER — TELEPHONE (OUTPATIENT)
Dept: FAMILY MEDICINE | Facility: CLINIC | Age: 26
End: 2021-10-12

## 2021-10-12 DIAGNOSIS — R00.2 PALPITATIONS: Primary | ICD-10-CM

## 2021-10-18 ENCOUNTER — OFFICE VISIT (OUTPATIENT)
Dept: FAMILY MEDICINE | Facility: CLINIC | Age: 26
End: 2021-10-18
Payer: COMMERCIAL

## 2021-10-18 VITALS
DIASTOLIC BLOOD PRESSURE: 72 MMHG | OXYGEN SATURATION: 97 % | SYSTOLIC BLOOD PRESSURE: 118 MMHG | WEIGHT: 178 LBS | HEART RATE: 93 BPM | BODY MASS INDEX: 30.55 KG/M2

## 2021-10-18 DIAGNOSIS — B34.9 VIRAL SYNDROME: Primary | ICD-10-CM

## 2021-10-18 DIAGNOSIS — R11.0 NAUSEA: ICD-10-CM

## 2021-10-18 PROCEDURE — 99213 OFFICE O/P EST LOW 20 MIN: CPT | Performed by: FAMILY MEDICINE

## 2021-10-18 RX ORDER — ONDANSETRON 4 MG/1
4 TABLET, FILM COATED ORAL EVERY 6 HOURS PRN
Qty: 15 TABLET | Refills: 0 | Status: SHIPPED | OUTPATIENT
Start: 2021-10-18 | End: 2022-01-25

## 2021-10-18 NOTE — LETTER
October 18, 2021      Tonya Thayer  [address] [facility] [address]        To Whom It May Concern:    Tonyapreet Thayer  was seen on 10/18/21.  Please excuse her for another 2-3 days until her symptoms of nausea and diarrhea have improved.         Sincerely,        KAT AWAN

## 2021-10-19 ENCOUNTER — LAB (OUTPATIENT)
Dept: LAB | Facility: CLINIC | Age: 26
End: 2021-10-19
Payer: COMMERCIAL

## 2021-10-19 DIAGNOSIS — R00.2 PALPITATIONS: ICD-10-CM

## 2021-10-19 PROBLEM — F32.9 MAJOR DEPRESSION: Status: ACTIVE | Noted: 2019-06-13

## 2021-10-19 LAB
ALBUMIN SERPL-MCNC: 3.9 G/DL (ref 3.5–5)
ALP SERPL-CCNC: 49 U/L (ref 45–120)
ALT SERPL W P-5'-P-CCNC: 13 U/L (ref 0–45)
ANION GAP SERPL CALCULATED.3IONS-SCNC: 8 MMOL/L (ref 5–18)
AST SERPL W P-5'-P-CCNC: 16 U/L (ref 0–40)
BASOPHILS # BLD AUTO: 0 10E3/UL (ref 0–0.2)
BASOPHILS NFR BLD AUTO: 0 %
BILIRUB SERPL-MCNC: 0.8 MG/DL (ref 0–1)
BUN SERPL-MCNC: 10 MG/DL (ref 8–22)
CALCIUM SERPL-MCNC: 9.4 MG/DL (ref 8.5–10.5)
CHLORIDE BLD-SCNC: 108 MMOL/L (ref 98–107)
CO2 SERPL-SCNC: 25 MMOL/L (ref 22–31)
CREAT SERPL-MCNC: 0.6 MG/DL (ref 0.6–1.1)
EOSINOPHIL # BLD AUTO: 0.1 10E3/UL (ref 0–0.7)
EOSINOPHIL NFR BLD AUTO: 1 %
ERYTHROCYTE [DISTWIDTH] IN BLOOD BY AUTOMATED COUNT: 12.9 % (ref 10–15)
ERYTHROCYTE [SEDIMENTATION RATE] IN BLOOD BY WESTERGREN METHOD: 14 MM/HR (ref 0–20)
GFR SERPL CREATININE-BSD FRML MDRD: >90 ML/MIN/1.73M2
GLUCOSE BLD-MCNC: 84 MG/DL (ref 70–125)
HCT VFR BLD AUTO: 35.6 % (ref 35–47)
HGB BLD-MCNC: 12.9 G/DL (ref 11.7–15.7)
IMM GRANULOCYTES # BLD: 0.1 10E3/UL
IMM GRANULOCYTES NFR BLD: 0 %
LYMPHOCYTES # BLD AUTO: 2.6 10E3/UL (ref 0.8–5.3)
LYMPHOCYTES NFR BLD AUTO: 15 %
MCH RBC QN AUTO: 31.7 PG (ref 26.5–33)
MCHC RBC AUTO-ENTMCNC: 36.2 G/DL (ref 31.5–36.5)
MCV RBC AUTO: 88 FL (ref 78–100)
MONOCYTES # BLD AUTO: 0.9 10E3/UL (ref 0–1.3)
MONOCYTES NFR BLD AUTO: 5 %
NEUTROPHILS # BLD AUTO: 13.3 10E3/UL (ref 1.6–8.3)
NEUTROPHILS NFR BLD AUTO: 78 %
PLATELET # BLD AUTO: 545 10E3/UL (ref 150–450)
POTASSIUM BLD-SCNC: 4.3 MMOL/L (ref 3.5–5)
PROT SERPL-MCNC: 7.3 G/DL (ref 6–8)
RBC # BLD AUTO: 4.07 10E6/UL (ref 3.8–5.2)
SODIUM SERPL-SCNC: 141 MMOL/L (ref 136–145)
TSH SERPL DL<=0.005 MIU/L-ACNC: 0.76 UIU/ML (ref 0.3–5)
WBC # BLD AUTO: 17.1 10E3/UL (ref 4–11)

## 2021-10-19 PROCEDURE — 36415 COLL VENOUS BLD VENIPUNCTURE: CPT | Performed by: FAMILY MEDICINE

## 2021-10-19 PROCEDURE — 85027 COMPLETE CBC AUTOMATED: CPT | Performed by: FAMILY MEDICINE

## 2021-10-19 PROCEDURE — 80053 COMPREHEN METABOLIC PANEL: CPT | Performed by: FAMILY MEDICINE

## 2021-10-19 PROCEDURE — 85652 RBC SED RATE AUTOMATED: CPT | Performed by: FAMILY MEDICINE

## 2021-10-19 PROCEDURE — 84443 ASSAY THYROID STIM HORMONE: CPT | Performed by: FAMILY MEDICINE

## 2021-10-20 NOTE — PROGRESS NOTES
Problem List Items Addressed This Visit     None      Visit Diagnoses     Viral syndrome    -  Primary    Nausea        Relevant Medications    ondansetron (ZOFRAN) 4 MG tablet        The patient has symptoms most consistent with a viral gastroenteritis.  Her ear exam was unremarkable other than some clear fluid behind the tympanic membrane today.  I prescribed some Zofran to take as needed for nausea and recommended some probiotic.  She could also take some Imodium sparingly as needed.  If her symptoms are not starting to improve by 7 to 10 days out from onset of illness I asked her to get back in touch.    KAT Castaneda is a 25 year old who presents today with about a 4 or 5-day history of illness.  The patient reports onset of nausea followed by watery diarrhea.  She has had very limited appetite during this time although does feel she is staying hydrated.  She has also been feeling quite fatigued.  She notes that a GI illness such as this has been going around her work, but most people have gotten better fairly quickly.  She does not have any blood in the stool and denies any fever.  She started feeling some pain in her left ear as well and was concerned about a possible ear infection.  The nausea has been pervasive and quite bothersome.  She denies any associated abdominal pain.     Objective    /72 (BP Location: Left arm, Patient Position: Left side, Cuff Size: Adult Large)   Pulse 93   Wt 80.7 kg (178 lb)   SpO2 97%   BMI 30.55 kg/m    Body mass index is 30.55 kg/m .  Physical Exam   GENERAL: healthy, alert and no distress  HENT: ear canals and TM's normal, nose and mouth without ulcers or lesions  RESP: lungs clear to auscultation - no rales, rhonchi or wheezes  CV: regular rate and rhythm, normal S1 S2, no S3 or S4, no murmur, click or rub, no peripheral edema and peripheral pulses strong  ABDOMEN: soft, nontender, no hepatosplenomegaly, no masses and bowel sounds  normal

## 2021-10-21 ENCOUNTER — MYC MEDICAL ADVICE (OUTPATIENT)
Dept: FAMILY MEDICINE | Facility: CLINIC | Age: 26
End: 2021-10-21

## 2021-10-21 DIAGNOSIS — F41.1 GAD (GENERALIZED ANXIETY DISORDER): ICD-10-CM

## 2021-10-23 ENCOUNTER — APPOINTMENT (OUTPATIENT)
Dept: RADIOLOGY | Facility: CLINIC | Age: 26
End: 2021-10-23
Attending: EMERGENCY MEDICINE
Payer: COMMERCIAL

## 2021-10-23 ENCOUNTER — HOSPITAL ENCOUNTER (EMERGENCY)
Facility: CLINIC | Age: 26
Discharge: HOME | End: 2021-10-23
Attending: EMERGENCY MEDICINE | Admitting: EMERGENCY MEDICINE
Payer: COMMERCIAL

## 2021-10-23 ENCOUNTER — APPOINTMENT (OUTPATIENT)
Dept: CT IMAGING | Facility: CLINIC | Age: 26
End: 2021-10-23
Attending: EMERGENCY MEDICINE
Payer: COMMERCIAL

## 2021-10-23 VITALS
SYSTOLIC BLOOD PRESSURE: 121 MMHG | HEIGHT: 63 IN | OXYGEN SATURATION: 99 % | HEART RATE: 73 BPM | RESPIRATION RATE: 16 BRPM | TEMPERATURE: 98.7 F | WEIGHT: 178 LBS | BODY MASS INDEX: 31.54 KG/M2 | DIASTOLIC BLOOD PRESSURE: 64 MMHG

## 2021-10-23 DIAGNOSIS — D72.829 LEUKOCYTOSIS, UNSPECIFIED TYPE: ICD-10-CM

## 2021-10-23 DIAGNOSIS — D75.839 THROMBOCYTOSIS: ICD-10-CM

## 2021-10-23 DIAGNOSIS — Z90.81 ASPLENIA AFTER SURGICAL PROCEDURE: ICD-10-CM

## 2021-10-23 LAB
ALBUMIN UR-MCNC: NEGATIVE MG/DL
ANION GAP SERPL CALCULATED.3IONS-SCNC: 10 MMOL/L (ref 5–18)
APPEARANCE UR: CLEAR
ATRIAL RATE - MUSE: 68 BPM
BASOPHILS # BLD AUTO: 0.1 10E3/UL (ref 0–0.2)
BASOPHILS NFR BLD AUTO: 1 %
BILIRUB UR QL STRIP: NEGATIVE
BUN SERPL-MCNC: 10 MG/DL (ref 8–22)
CALCIUM SERPL-MCNC: 8.4 MG/DL (ref 8.5–10.5)
CHLORIDE BLD-SCNC: 108 MMOL/L (ref 98–107)
CO2 SERPL-SCNC: 23 MMOL/L (ref 22–31)
COLOR UR AUTO: ABNORMAL
CREAT SERPL-MCNC: 0.75 MG/DL (ref 0.6–1.1)
DIASTOLIC BLOOD PRESSURE - MUSE: NORMAL MMHG
EOSINOPHIL # BLD AUTO: 0.2 10E3/UL (ref 0–0.7)
EOSINOPHIL NFR BLD AUTO: 2 %
ERYTHROCYTE [DISTWIDTH] IN BLOOD BY AUTOMATED COUNT: 13.2 % (ref 10–15)
GFR SERPL CREATININE-BSD FRML MDRD: >90 ML/MIN/1.73M2
GLUCOSE BLD-MCNC: 100 MG/DL (ref 70–125)
GLUCOSE UR STRIP-MCNC: NEGATIVE MG/DL
HCG SERPL QL: NEGATIVE
HCT VFR BLD AUTO: 34.9 % (ref 35–47)
HGB BLD-MCNC: 12.4 G/DL (ref 11.7–15.7)
HGB UR QL STRIP: NEGATIVE
IMM GRANULOCYTES # BLD: 0.1 10E3/UL
IMM GRANULOCYTES NFR BLD: 1 %
INTERPRETATION ECG - MUSE: NORMAL
KETONES UR STRIP-MCNC: NEGATIVE MG/DL
LEUKOCYTE ESTERASE UR QL STRIP: NEGATIVE
LYMPHOCYTES # BLD AUTO: 3 10E3/UL (ref 0.8–5.3)
LYMPHOCYTES NFR BLD AUTO: 23 %
MCH RBC QN AUTO: 31.1 PG (ref 26.5–33)
MCHC RBC AUTO-ENTMCNC: 35.5 G/DL (ref 31.5–36.5)
MCV RBC AUTO: 88 FL (ref 78–100)
MONOCYTES # BLD AUTO: 0.8 10E3/UL (ref 0–1.3)
MONOCYTES NFR BLD AUTO: 7 %
MUCOUS THREADS #/AREA URNS LPF: PRESENT /LPF
NEUTROPHILS # BLD AUTO: 8.6 10E3/UL (ref 1.6–8.3)
NEUTROPHILS NFR BLD AUTO: 66 %
NITRATE UR QL: NEGATIVE
NRBC # BLD AUTO: 0 10E3/UL
NRBC BLD AUTO-RTO: 0 /100
P AXIS - MUSE: 60 DEGREES
PH UR STRIP: 7 [PH] (ref 5–7)
PLATELET # BLD AUTO: 608 10E3/UL (ref 150–450)
POTASSIUM BLD-SCNC: 3.6 MMOL/L (ref 3.5–5)
PR INTERVAL - MUSE: 138 MS
QRS DURATION - MUSE: 86 MS
QT - MUSE: 386 MS
QTC - MUSE: 410 MS
R AXIS - MUSE: 54 DEGREES
RBC # BLD AUTO: 3.99 10E6/UL (ref 3.8–5.2)
RBC URINE: 2 /HPF
SARS-COV-2 RNA RESP QL NAA+PROBE: NEGATIVE
SODIUM SERPL-SCNC: 141 MMOL/L (ref 136–145)
SP GR UR STRIP: 1.02 (ref 1–1.03)
SQUAMOUS EPITHELIAL: 1 /HPF
SYSTOLIC BLOOD PRESSURE - MUSE: NORMAL MMHG
T AXIS - MUSE: 39 DEGREES
UROBILINOGEN UR STRIP-MCNC: <2 MG/DL
VENTRICULAR RATE- MUSE: 68 BPM
WBC # BLD AUTO: 12.8 10E3/UL (ref 4–11)
WBC URINE: 1 /HPF

## 2021-10-23 PROCEDURE — 258N000003 HC RX IP 258 OP 636: Performed by: EMERGENCY MEDICINE

## 2021-10-23 PROCEDURE — 36415 COLL VENOUS BLD VENIPUNCTURE: CPT | Performed by: EMERGENCY MEDICINE

## 2021-10-23 PROCEDURE — 71046 X-RAY EXAM CHEST 2 VIEWS: CPT

## 2021-10-23 PROCEDURE — 87040 BLOOD CULTURE FOR BACTERIA: CPT | Performed by: EMERGENCY MEDICINE

## 2021-10-23 PROCEDURE — 84703 CHORIONIC GONADOTROPIN ASSAY: CPT | Performed by: EMERGENCY MEDICINE

## 2021-10-23 PROCEDURE — 80048 BASIC METABOLIC PNL TOTAL CA: CPT | Performed by: EMERGENCY MEDICINE

## 2021-10-23 PROCEDURE — 250N000013 HC RX MED GY IP 250 OP 250 PS 637: Performed by: EMERGENCY MEDICINE

## 2021-10-23 PROCEDURE — 99285 EMERGENCY DEPT VISIT HI MDM: CPT | Mod: 25

## 2021-10-23 PROCEDURE — 85025 COMPLETE CBC W/AUTO DIFF WBC: CPT | Performed by: EMERGENCY MEDICINE

## 2021-10-23 PROCEDURE — 70450 CT HEAD/BRAIN W/O DYE: CPT

## 2021-10-23 PROCEDURE — 87635 SARS-COV-2 COVID-19 AMP PRB: CPT | Performed by: EMERGENCY MEDICINE

## 2021-10-23 PROCEDURE — 93005 ELECTROCARDIOGRAM TRACING: CPT | Performed by: EMERGENCY MEDICINE

## 2021-10-23 PROCEDURE — 81001 URINALYSIS AUTO W/SCOPE: CPT | Performed by: EMERGENCY MEDICINE

## 2021-10-23 PROCEDURE — 96361 HYDRATE IV INFUSION ADD-ON: CPT

## 2021-10-23 PROCEDURE — 96360 HYDRATION IV INFUSION INIT: CPT

## 2021-10-23 PROCEDURE — C9803 HOPD COVID-19 SPEC COLLECT: HCPCS

## 2021-10-23 RX ORDER — IBUPROFEN 600 MG/1
600 TABLET, FILM COATED ORAL ONCE
Status: COMPLETED | OUTPATIENT
Start: 2021-10-23 | End: 2021-10-23

## 2021-10-23 RX ADMIN — IBUPROFEN 600 MG: 600 TABLET ORAL at 21:53

## 2021-10-23 RX ADMIN — SODIUM CHLORIDE 1000 ML: 9 INJECTION, SOLUTION INTRAVENOUS at 20:21

## 2021-10-23 ASSESSMENT — ENCOUNTER SYMPTOMS
SORE THROAT: 0
NAUSEA: 0
MYALGIAS: 1
CHILLS: 1
FEVER: 1
BACK PAIN: 0
HEADACHES: 1
FATIGUE: 1
COUGH: 0
SHORTNESS OF BREATH: 0
DIARRHEA: 0
VOMITING: 0
ABDOMINAL PAIN: 0

## 2021-10-23 ASSESSMENT — MIFFLIN-ST. JEOR: SCORE: 1531.53

## 2021-10-24 NOTE — DISCHARGE INSTRUCTIONS
Encourage rest and fluids.  Blood culture should return in the next day or 2.  They will contact you if this is positive or you can follow-up with your doctor.  See your doctor or return if worse or problems.

## 2021-10-24 NOTE — ED PROVIDER NOTES
EMERGENCY DEPARTMENT ENCOUNTER      NAME: Tonya Thayer  AGE: 25 year old female  YOB: 1995  MRN: [id number]  EVALUATION DATE & TIME: No admission date for patient encounter.    PCP: Michelle Medina    ED PROVIDER: Luisito Salinas M.D.      Chief Complaint   Patient presents with     Fatigue     Headache         FINAL IMPRESSION:  1.  Acute leukocytosis, improved.  2.  Chronic asplenia.      ED COURSE & MEDICAL DECISION MAKIN:25 PM I met with the patient to gather history and to perform my initial exam. We discussed plans for the ED course, including diagnostic testing and treatment. PPE worn: cloth mask.  Patient with lab work done 4 days ago at her doctor's office.  Normal TSH.  Normal chemistries.  Normal liver function test.  Normal recite sedimentation rate.  Normal hemoglobin.  Platelets are 545,000.  This is typical for her.  Review of her records reveals a normal count from 5  over the last years.  White count however was elevated at 17.1.  The last was in January and was 7.9.  Patient also notes generalized headache, achiness, transient tachycardia at home in the 1 50-1 60 range.  Chills but no fever.  Patient has hereditary spherocytosis and has had her spleen removed.  She denies any fever at home.  Evaluation for infection is proceeding.  10:52 PM I reevaluated and updated patient on lab work and imaging findings.  No source for infection is identified.  Normal head CT.  Normal chest x-ray.  Pregnancy test negative.  Urinalysis is clear.  Chemistries unremarkable.  Covid testing was negative.  Blood cultures were sent.  EKG unremarkable.  CBC with platelets still in her typical range in the 5  range.  Currently 608.  White blood cell count has come down to 12.8.  There is no left shift.  No source for infection is identified.  Patient is discharged home.  She is aware that blood culture results will come back in the next day or 2.  She will follow up with her  doctor or clinic.      Pertinent Labs & Imaging studies reviewed. (See chart for details)    25 year old female presents to the Emergency Department for evaluation of leukocytosis.    At the conclusion of the encounter I discussed the results of all of the tests and the disposition. The questions were answered. The patient or family acknowledged understanding and was agreeable with the care plan.           MEDICATIONS GIVEN IN THE EMERGENCY:  Medications   0.9% sodium chloride BOLUS (1,000 mLs Intravenous New Bag 10/23/21 2021)   ibuprofen (ADVIL/MOTRIN) tablet 600 mg (600 mg Oral Given 10/23/21 2153)       NEW PRESCRIPTIONS STARTED AT TODAY'S ER VISIT  New Prescriptions    No medications on file          =================================================================    HPI    Patient information was obtained from: patient    Use of : N/A         Tonya Thayer is a 25 year old female with a pertinent history of splenectomy and hereditary sherocytosis who presents to this ED today for evaluation of fatigue and headache.     Per EMR, patient was seen by PCP on 10/18 for suspected viral gastroenteritis and nausea/vomiting. She had blood work done.She was prescribed with Zofran. Patient is not vaccinated against COVID.    Patient presents with ongoing fatigue, chills, generalized body aches and headaches. No associated abdominal pain, nausea or vomiting. Patient also states that her heart rate has been higher than normal, elevated at around 160-180 bpm while she is laying down. No associated chest pain, shortness of breath, cough, or back pain.The lab work results from her last clinic visit showed platelet count of 545 and elevated WBC count of 17.1. All other blood lab work results were within normal limits. Given her history of hereditary spherocytosis, she had a splenectomy done at age 16. Denies chance of pregnancy. Denies tobacco or alcohol use.     She does not identify any waxing or waning  symptoms otherwise, exacerbating or alleviating features, associated symptoms except as mentioned.     REVIEW OF SYSTEMS   Review of Systems   Constitutional: Positive for chills, fatigue and fever.   HENT: Negative for congestion and sore throat.    Respiratory: Negative for cough and shortness of breath.    Cardiovascular: Negative for chest pain.   Gastrointestinal: Negative for abdominal pain, diarrhea, nausea and vomiting.   Musculoskeletal: Positive for myalgias. Negative for back pain.   Skin: Negative for rash.   Neurological: Positive for headaches.   All other systems reviewed and are negative.       PAST MEDICAL HISTORY:  Past Medical History:   Diagnosis Date     Anxiety      Asplenia      Cocaine use      Depression      Fever and chills      Hereditary spherocytosis (H)      Leukocytosis 7/18/2016     Pyelonephritis 11/15/2016     Sepsis, due to unspecified organism      SIRS (systemic inflammatory response syndrome) (H) 10/12/2019     Spherocytosis (H)      UTI (urinary tract infection)        PAST SURGICAL HISTORY:  Past Surgical History:   Procedure Laterality Date     C LAP,CHOLECYSTECTOMY/EXPLORE      Description: Cholecystectomy Laparoscopic;  Recorded: 10/07/2009;     C REMOVAL SPLEEN, TOTAL      Description: Splenectomy;  Recorded: 12/17/2012;  Comments: 11/12 secondary to hereditary spherocytosis. Recieved pneumovax, HIB and meningococcal vaccine prior to splenectomy. Needs pneumovax every 5 yrs for 2 doses and is on erythromycin prophylaxis. If gets fever >100.5, needs white count, differential, blood culture and strong consideration of rocephin 50-75 mg/kg. Should have CXR for any pul*     C REMOVAL SPLEEN, TOTAL      Description: Splenectomy;  Recorded: 09/20/2014;  Comments: 11/2012 secondary to hereditary spherocytosis. Recieved pneumovax, HIB and meningococcal vaccine prior to splenectomy. Needs pneumovax every 5 yrs for 2 doses. If gets fever >100.5, needs white count, differential,  blood culture and strong consideration of rocephin 50-75 mg/kg. Should have CXR for any pulmonary symptoms. All above recomm*     SPLENECTOMY             CURRENT MEDICATIONS:    escitalopram (LEXAPRO) 20 MG tablet  LORazepam (ATIVAN) 1 MG tablet  ondansetron (ZOFRAN) 4 MG tablet        ALLERGIES:  Allergies   Allergen Reactions     Amoxicillin      Levofloxacin Shortness Of Breath     20 minutes after levofloxacin infusion patient felt short of breath and warm.  She had some obvious facial flushing     Prochlorperazine Anxiety, Other (See Comments), Palpitations and Shortness Of Breath     Buspirone Other (See Comments)     Troubles sleeping, restless feeling.      Penicillins      Tramadol Nausea and Vomiting       FAMILY HISTORY:  Family History   Problem Relation Age of Onset     Clotting Disorder Brother         Hereditary Spherocytosis       SOCIAL HISTORY:   Social History     Socioeconomic History     Marital status: Single     Spouse name: Not on file     Number of children: Not on file     Years of education: Not on file     Highest education level: Not on file   Occupational History     Not on file   Tobacco Use     Smoking status: Former Smoker     Years: 3.00     Quit date: 7/15/2020     Years since quittin.2     Smokeless tobacco: Never Used     Tobacco comment: 2 to 3 cigs a day.   Substance and Sexual Activity     Alcohol use: Not Currently     Comment: Alcoholic Drinks/day: 1 beer every few months      Drug use: Not Currently     Sexual activity: Not Currently     Partners: Male   Other Topics Concern     Not on file   Social History Narrative    Lives at home with mother and brother.     Social Determinants of Health     Financial Resource Strain:      Difficulty of Paying Living Expenses:    Food Insecurity:      Worried About Running Out of Food in the Last Year:      Ran Out of Food in the Last Year:    Transportation Needs:      Lack of Transportation (Medical):      Lack of Transportation  "(Non-Medical):    Physical Activity:      Days of Exercise per Week:      Minutes of Exercise per Session:    Stress:      Feeling of Stress :    Social Connections:      Frequency of Communication with Friends and Family:      Frequency of Social Gatherings with Friends and Family:      Attends Tenriism Services:      Active Member of Clubs or Organizations:      Attends Club or Organization Meetings:      Marital Status:    Intimate Partner Violence:      Fear of Current or Ex-Partner:      Emotionally Abused:      Physically Abused:      Sexually Abused:    Former cocaine use.  Former tobacco use.  Currently no drugs, tobacco, or alcohol.    VITALS:  /64   Pulse 67   Temp 98.7  F (37.1  C)   Resp 16   Ht 1.6 m (5' 3\")   Wt 80.7 kg (178 lb)   LMP 09/30/2021   SpO2 98%   BMI 31.53 kg/m      PHYSICAL EXAM    Vital Signs:  /64   Pulse 67   Temp 98.7  F (37.1  C)   Resp 16   Ht 1.6 m (5' 3\")   Wt 80.7 kg (178 lb)   LMP 09/30/2021   SpO2 98%   BMI 31.53 kg/m    General:  On entering the room she is in no apparent distress.    Neck:  Neck supple with full range of motion and nontender.    Back:  Back and spine are nontender.  No costovertebral angle tenderness.    HEENT:  Oropharynx clear with moist mucous membranes.  HEENT unremarkable.    Pulmonary:  Chest clear to auscultation without rhonchi rales or wheezing.    Cardiovascular:  Cardiac regular rate and rhythm without murmurs rubs or gallops.    Abdomen:  Abdomen soft nontender.  There is no rebound or guarding.    Muskuloskeletal:  she moves all 4 without any difficulty and has normal neurovascular exams.  Extremities without clubbing, cyanosis, or edema.  Legs and calves are nontender.    Neuro:  she is alert and oriented ×3 and moves all extremities symmetrically.    Psych:  Normal affect.    Skin:  Unremarkable and warm and dry.       LAB:  All pertinent labs reviewed and interpreted.  Labs Ordered and Resulted from Time of ED " Arrival Up to the Time of Departure from the ED   BASIC METABOLIC PANEL - Abnormal; Notable for the following components:       Result Value    Chloride 108 (*)     Calcium 8.4 (*)     All other components within normal limits   ROUTINE UA WITH MICROSCOPIC REFLEX TO CULTURE - Abnormal; Notable for the following components:    Mucus Urine Present (*)     All other components within normal limits    Narrative:     Urine Culture not indicated   CBC WITH PLATELETS AND DIFFERENTIAL - Abnormal; Notable for the following components:    WBC Count 12.8 (*)     Hematocrit 34.9 (*)     Platelet Count 608 (*)     Absolute Neutrophils 8.6 (*)     Absolute Immature Granulocytes 0.1 (*)     All other components within normal limits   COVID-19 VIRUS (CORONAVIRUS) BY PCR - Normal    Narrative:     Testing was performed using the adri  SARS-CoV-2 & Influenza A/B Assay on the adri  Clarita  System.  This test should be ordered for the detection of SARS-COV-2 in individuals who meet SARS-CoV-2 clinical and/or epidemiological criteria. Test performance is unknown in asymptomatic patients.  This test is for in vitro diagnostic use under the FDA EUA for laboratories certified under CLIA to perform moderate and/or high complexity testing. This test has not been FDA cleared or approved.  A negative test does not rule out the presence of PCR inhibitors in the specimen or target RNA in concentration below the limit of detection for the assay. The possibility of a false negative should be considered if the patient's recent exposure or clinical presentation suggests COVID-19.  [facility] are certified under the Clinical Laboratory Improvement Amendments of 1988 (CLIA-88) as qualified to perform moderate and/or high complexity laboratory testing.   HCG QUALITATIVE PREGNANCY - Normal   MAY SALINE LOCK IV   BLOOD CULTURE   CBC WITH PLATELETS & DIFFERENTIAL    Narrative:     The following orders were created for panel order CBC  with platelets + differential.  Procedure                               Abnormality         Status                     ---------                               -----------         ------                     CBC with platelets and d...[[id number]]  Abnormal            Final result                 Please view results for these tests on the individual orders.       RADIOLOGY:  Reviewed all pertinent imaging. Please see official radiology report.  Chest XR,  PA & LAT   Final Result   IMPRESSION: Negative chest.      Head CT w/o contrast   Final Result   IMPRESSION:   1.  No acute intracranial process.                 EKG:    Normal sinus rhythm and sinus arrhythmia 68.  Normal EKG.  Morphology similar to previous EKG of December 2020 but slower rate.    I have independently reviewed and interpreted the EKG(s) documented above.    PROCEDURES:   None.       I, Sara Behmanesh , am serving as a scribe to document services personally performed by Dr. Salinas based on my observation and the provider's statements to me. I, Luisito Salinas MD attest that Sara Behmanesh is acting in a scribe capacity, has observed my performance of the services and has documented them in accordance with my direction.    Luisito Salinas M.D.  Emergency Medicine  [facility] EMERGENCY ROOM  [address] [id number] [phone]  Dept: [phone]       Luisito Salinas MD  10/23/21 4513

## 2021-10-24 NOTE — ED TRIAGE NOTES
Patient is here with fatigue. She did go to the clinic and had blood work done on 10/19/2021 and showed an elevated WBC and platelets. She complains of a headache.

## 2021-10-25 DIAGNOSIS — F41.1 GAD (GENERALIZED ANXIETY DISORDER): ICD-10-CM

## 2021-10-25 RX ORDER — LORAZEPAM 1 MG/1
TABLET ORAL
Qty: 30 TABLET | Refills: 1 | Status: SHIPPED | OUTPATIENT
Start: 2021-10-25 | End: 2022-01-25

## 2021-10-25 RX ORDER — LORAZEPAM 1 MG/1
TABLET ORAL
Qty: 30 TABLET | Refills: 1 | Status: CANCELLED | OUTPATIENT
Start: 2021-10-25

## 2021-10-25 NOTE — TELEPHONE ENCOUNTER
Medication: Ativan 1mg  Last Date Filled 8/6/21  Last appointment addressing medication use: 8/6/21      Taken as prescribed from physician notes?     CSA in last year: NO    Random Utox in last year: NO  (if any of the above answer NO - schedule with PCP)     Opioids + benzodiazepines? NO  (if the above answer YES - schedule with PCP every 6 months)       All responses suggest: Scheduling with PCP for further intervention

## 2021-10-29 LAB — BACTERIA BLD CULT: NO GROWTH

## 2021-11-02 ENCOUNTER — OFFICE VISIT (OUTPATIENT)
Dept: FAMILY MEDICINE | Facility: CLINIC | Age: 26
End: 2021-11-02
Payer: COMMERCIAL

## 2021-11-02 VITALS
HEART RATE: 80 BPM | DIASTOLIC BLOOD PRESSURE: 62 MMHG | BODY MASS INDEX: 31.18 KG/M2 | WEIGHT: 176 LBS | SYSTOLIC BLOOD PRESSURE: 100 MMHG | OXYGEN SATURATION: 97 %

## 2021-11-02 DIAGNOSIS — F41.1 GAD (GENERALIZED ANXIETY DISORDER): Primary | ICD-10-CM

## 2021-11-02 PROCEDURE — 99213 OFFICE O/P EST LOW 20 MIN: CPT | Performed by: FAMILY MEDICINE

## 2021-11-02 RX ORDER — ESCITALOPRAM OXALATE 20 MG/1
20 TABLET ORAL DAILY
Qty: 90 TABLET | Refills: 1 | Status: SHIPPED | OUTPATIENT
Start: 2021-11-02 | End: 2023-02-07

## 2021-11-02 RX ORDER — HYDROXYZINE PAMOATE 25 MG/1
25 CAPSULE ORAL
Qty: 60 CAPSULE | Refills: 3 | Status: SHIPPED | OUTPATIENT
Start: 2021-11-02 | End: 2023-05-09

## 2021-11-02 ASSESSMENT — ANXIETY QUESTIONNAIRES
GAD7 TOTAL SCORE: 14
8. IF YOU CHECKED OFF ANY PROBLEMS, HOW DIFFICULT HAVE THESE MADE IT FOR YOU TO DO YOUR WORK, TAKE CARE OF THINGS AT HOME, OR GET ALONG WITH OTHER PEOPLE?: SOMEWHAT DIFFICULT
4. TROUBLE RELAXING: MORE THAN HALF THE DAYS
1. FEELING NERVOUS, ANXIOUS, OR ON EDGE: NEARLY EVERY DAY
7. FEELING AFRAID AS IF SOMETHING AWFUL MIGHT HAPPEN: NOT AT ALL
GAD7 TOTAL SCORE: 14
6. BECOMING EASILY ANNOYED OR IRRITABLE: MORE THAN HALF THE DAYS
GAD7 TOTAL SCORE: 14
7. FEELING AFRAID AS IF SOMETHING AWFUL MIGHT HAPPEN: NOT AT ALL
5. BEING SO RESTLESS THAT IT IS HARD TO SIT STILL: MORE THAN HALF THE DAYS
3. WORRYING TOO MUCH ABOUT DIFFERENT THINGS: NEARLY EVERY DAY
2. NOT BEING ABLE TO STOP OR CONTROL WORRYING: MORE THAN HALF THE DAYS

## 2021-11-02 ASSESSMENT — PATIENT HEALTH QUESTIONNAIRE - PHQ9
SUM OF ALL RESPONSES TO PHQ QUESTIONS 1-9: 7
SUM OF ALL RESPONSES TO PHQ QUESTIONS 1-9: 7
10. IF YOU CHECKED OFF ANY PROBLEMS, HOW DIFFICULT HAVE THESE PROBLEMS MADE IT FOR YOU TO DO YOUR WORK, TAKE CARE OF THINGS AT HOME, OR GET ALONG WITH OTHER PEOPLE: SOMEWHAT DIFFICULT

## 2021-11-02 NOTE — PROGRESS NOTES
Assessment & Plan     ZION (generalized anxiety disorder)  -Flared recently mostly secondary to stressors.  She is going to continue on the 20 mg orally daily of Lexapro, as well as added Vistaril as needed to help especially with the nighttime anxiety.  If she does not find that things are continuing to improve I think her next best option would be to add on a small dose of mirtazapine at bedtime given her prior poor response to buspirone.  She will plan on following up here in 3 months for a physical, sooner as needed.  -updated CSA today for lorazepam as well for sparing use. Has been compliant with it.   - hydrOXYzine (VISTARIL) 25 MG capsule  Dispense: 60 capsule; Refill: 3  - escitalopram (LEXAPRO) 20 MG tablet  Dispense: 90 tablet; Refill: 1             Return in about 3 months (around 2/2/2022) for Routine preventive.    Michelle Medina MD  [facility] LUDY Castaneda is a 25 year old who presents for the following health issues     HPI     She is here today for a mediation check. She does note that she has a lot of issues sleeping although is tired all the time. She does wonder if her brain is not just shutting down. She does have all her screens off. She does think that she is having racing thoughts with this.     She has had a rough few months. She did start a new job.Buspirone made her feel off before when she was on this one.     She is taking the lorazepam sparingly, sometimes she can take it 3 days in a row and then nothing for a few days. It's been more in the last 3 months than the last year.       Review of Systems   Per above      Objective    /62   Pulse 80   Wt 79.8 kg (176 lb)   LMP 10/25/2021 (Approximate)   SpO2 97%   Breastfeeding No   BMI 31.18 kg/m    Body mass index is 31.18 kg/m .  Physical Exam   GENERAL: healthy, alert and no distress  PSYCH: mentation appears normal, affect normal/bright                Answers for HPI/ROS submitted by  the patient on 11/2/2021  If you checked off any problems, how difficult have these problems made it for you to do your work, take care of things at home, or get along with other people?: Somewhat difficult  PHQ9 TOTAL SCORE: 7  ZION 7 TOTAL SCORE: 14

## 2021-11-02 NOTE — LETTER
[facility] LUDY [place]  11/02/21  Patient: Tonya Thayer  YOB: 1995  Medical Record Number: [id number]                                                                                  Non-Opioid Controlled Substance Agreement    This is an agreement between you and your provider regarding safe and appropriate use of controlled substances prescribed by your care team. Controlled substances are?medicines that can cause physical and mental dependence (abuse).     There are strict laws about having and using these medicines. We here at [facility] are  committed to working with you in your efforts to get better. To support you in this work, we'll help you schedule regular office appointments for medicine refills. If we must cancel or change your appointment for any reason, we'll make sure you have enough medicine to last until your next appointment.     As a Provider, I will:     Listen carefully to your concerns while treating you with respect.     Recommend a treatment plan that I believe is in your best interest and may involve therapies other than medicine.      Talk with you often about the possible benefits and the risk of harm of any medicine that we prescribe for you.    Assess the safety of this medicine and check how well it works.      Provide a plan on how to taper (discontinue or go off) using this medicine if the decision is made to stop its use.      ::  As a Patient, I understand controlled substances:       Are prescribed by my care provider to help me function or work and manage my condition(s).?    Are strong medicines and can cause serious side effects.       Need to be taken exactly as prescribed.?Combining controlled substances with certain medicines or chemicals (such as illegal drugs, alcohol, sedatives, sleeping pills, and benzodiazepines) can be dangerous or even fatal.? If I stop taking my medicines suddenly, I may have severe withdrawal symptoms.      The risks, benefits, and side effects of these medicine(s) were explained to me. I agree that:    1. I will take part in other treatments as advised by my care team. This may be psychiatry or counseling, physical therapy, behavioral therapy, group treatment or a referral to specialist.    2. I will keep all my appointments and understand this is part of the monitoring of controlled substances.?My care team may require an office visit for EVERY controlled substance refill. If I miss appointments or don t follow instructions, my care team may stop my medicine    3. I will take my medicines as prescribed. I will not change the dose or schedule unless my care team tells me to. There will be no refills if I run out early.      4. I may be asked to come to the clinic and complete a urine drug test or complete a pill count. If I don t give a urine sample or participate in a pill count, the care team may stop my medicine.    5. I will only receive controlled substance prescriptions from this clinic. If I am treated by another provider, I will tell them that I am taking controlled substances and that I have a treatment agreement with this provider. I will inform my [facility] care team within one business day if I am given a prescription for any controlled substance by another healthcare provider. My [facility] care team can contact other providers and pharmacists about my use of any medicines.    6. It is up to me to make sure that I don't run out of my medicines on weekends or holidays.?If my care team is willing to refill my prescription without a visit, I must request refills only during office hours. Refills may take up to 3 business days to process. I will use one pharmacy to fill all my controlled substance prescriptions. I will notify the clinic about any changes to my insurance or medicine availability.    7. I am responsible for my prescriptions. If the medicine/prescription is lost, stolen or  destroyed, it will not be replaced.?I also agree not to share controlled substance medicines with anyone.     8. I am aware I should not use any illegal or recreational drugs. I agree not to drink alcohol unless my care team says I can.     9. If I enroll in the [place] Medical Cannabis program, I will tell my care team before my next refill.    10. I will tell my care team right away if I become pregnant, have a new medical problem treated outside of my regular clinic, or have a change in my medicines.     11. I understand that this medicine can affect my thinking, judgment and reaction time.? Alcohol and drugs affect the brain and body, which can affect the safety of my driving. Being under the influence of alcohol or drugs can affect my decision-making, behaviors, personal safety and the safety of others. Driving while impaired (DWI) can occur if a person is driving, operating or in physical control of a car, motorcycle, boat, snowmobile, ATV, motorbike, off-road vehicle or any other motor vehicle ([place] Statute 169A.20). I understand the risk if I choose to drive or operate any vehicle or machinery.    I understand that if I do not follow any of the conditions above, my prescriptions or treatment may be stopped or changed.   I agree that my provider, clinic care team and pharmacy may work with any city, state or federal law enforcement agency that investigates the misuse, sale or other diversion of my controlled medicine. I will allow my provider to discuss my care with, or share a copy of, this agreement with any other treating provider, pharmacy or emergency room where I receive care.     I have read this agreement and have asked questions about anything I did not understand.    ________________________________________________________  Patient Signature - Tonya Thayer     ___________________                   Date     ________________________________________________________  Provider Signature - Michelle  E Medina, MD       ___________________                   Date     ________________________________________________________  Witness Signature (required if provider not present while patient signing)          ___________________                   Date

## 2021-11-03 ASSESSMENT — PATIENT HEALTH QUESTIONNAIRE - PHQ9: SUM OF ALL RESPONSES TO PHQ QUESTIONS 1-9: 7

## 2021-11-03 ASSESSMENT — ANXIETY QUESTIONNAIRES: GAD7 TOTAL SCORE: 14

## 2021-11-28 ENCOUNTER — TRANSFERRED RECORDS (OUTPATIENT)
Dept: HEALTH INFORMATION MANAGEMENT | Facility: CLINIC | Age: 26
End: 2021-11-28
Payer: COMMERCIAL

## 2021-12-14 ENCOUNTER — HOSPITAL ENCOUNTER (EMERGENCY)
Facility: CLINIC | Age: 26
Discharge: LEFT BEFORE DISCHARGE | End: 2021-12-14
Payer: COMMERCIAL

## 2021-12-14 VITALS
DIASTOLIC BLOOD PRESSURE: 81 MMHG | RESPIRATION RATE: 16 BRPM | OXYGEN SATURATION: 98 % | HEART RATE: 110 BPM | TEMPERATURE: 97.9 F | BODY MASS INDEX: 30.11 KG/M2 | WEIGHT: 170 LBS | SYSTOLIC BLOOD PRESSURE: 139 MMHG

## 2021-12-14 NOTE — ED TRIAGE NOTES
Pt awoke this am with mid sternal chest pain and had emesis because of the pain. No medications PTA. Felt fine going to bed last evening. Did have a few drinks with supper last evening.

## 2021-12-16 ENCOUNTER — OFFICE VISIT (OUTPATIENT)
Dept: FAMILY MEDICINE | Facility: CLINIC | Age: 26
End: 2021-12-16
Payer: COMMERCIAL

## 2021-12-16 VITALS
SYSTOLIC BLOOD PRESSURE: 110 MMHG | HEART RATE: 93 BPM | BODY MASS INDEX: 30.65 KG/M2 | OXYGEN SATURATION: 98 % | DIASTOLIC BLOOD PRESSURE: 80 MMHG | WEIGHT: 173 LBS

## 2021-12-16 DIAGNOSIS — F41.1 GAD (GENERALIZED ANXIETY DISORDER): ICD-10-CM

## 2021-12-16 DIAGNOSIS — R07.9 INTERMITTENT CHEST PAIN: Primary | ICD-10-CM

## 2021-12-16 DIAGNOSIS — H81.10 BENIGN PAROXYSMAL POSITIONAL VERTIGO, UNSPECIFIED LATERALITY: ICD-10-CM

## 2021-12-16 PROCEDURE — 99215 OFFICE O/P EST HI 40 MIN: CPT | Performed by: FAMILY MEDICINE

## 2021-12-16 RX ORDER — MIRTAZAPINE 15 MG/1
15 TABLET, FILM COATED ORAL AT BEDTIME
Qty: 30 TABLET | Refills: 1 | Status: SHIPPED | OUTPATIENT
Start: 2021-12-16 | End: 2022-01-25

## 2021-12-16 NOTE — PATIENT INSTRUCTIONS
You could try performing the at-home epley maneuver once to twice daily to try to help with the intermittent vertigo symptoms you are noticing.      I placed a referral for you to see cardiology regarding your persistent intermittent chest pains.      Please try adding in mirtazapine at bedtime to help with anxiety symptoms.      Please schedule a follow-up visit with Dr Medina in 4 weeks.

## 2021-12-16 NOTE — PROGRESS NOTES
Assessment / Plan    Tonya was seen today for hospital f/u.    Diagnoses and all orders for this visit:    Intermittent chest pain  -     Adult Cardiology Eval Referral; Future    Benign paroxysmal positional vertigo, unspecified laterality    ZION (generalized anxiety disorder)  -     Discontinue: mirtazapine (REMERON) 15 MG tablet; Take 1 tablet (15 mg) by mouth At Bedtime    patient with intermittent episodes of mid-sternal chest discomfort ongoing for the past several month.  History is not entirely consistent with cardiac source however this is certainly the patient's greatest fear.  We reviewed potential additional testing and her preference would be to visit with cardiology to ascertain their opinion and recommendations.  Referral is placed to cardiology.    Patient describes symptoms suggestive of vertigo and has a positive Madelin-Hallpike maneuver on exam.  We reviewed that there may be some benefit to attempting Epley maneuver at home and instructional handout is provided to the patient.  At this point it sounds like her symptoms are already beginning to improve on their own, and would expect this to continue over the next week.  Patient is advised to return for follow-up if symptoms are not improving as expected.    For significant anxiety symptoms not adequately controlled on current medications, recommend addition of mirtazapine as per orders.  Potential side effects of this medication are reviewed as well as reasonable expectations of its therapeutic effect.  Follow-up recommended with patient's primary care provider, Dr. Medina in 4 weeks.    Patient Instructions   You could try performing the at-home epley maneuver once to twice daily to try to help with the intermittent vertigo symptoms you are noticing.      I placed a referral for you to see cardiology regarding your persistent intermittent chest pains.      Please try adding in mirtazapine at bedtime to help with anxiety symptoms.      Please  "schedule a follow-up visit with Dr Medina in 4 weeks.      Return in about 4 weeks (around 1/13/2022) for Follow up anxiety symptoms with Dr Medina .    40 minutes spent on the date of the encounter doing chart review, history and exam, documentation and further activities per the note    Subjective   Tonya Thayer is a 26 year old year old female who presents with the following concerns:     Episodic Chest pains - patient had presented to [facility] on 12/14 for evaluation of mid sternal chest pain episode with associated episode of vomiting.  She notes that pain had lasted for \"hours\" and resolved without intervention. She reports that she had waited in the waiting room for 5 hours but as her pain had resolved, she had left without being seen.     Patient describes that she has been experiencing episodes of chest pain frequently for the last several months, and likely longer.  She thinks that they started sometime after having COVID-19 infection in December 2020. She noted no associated breathing difficulty, but has noted some intermittent palpitations/tachycardia, not necessarily present during episodes of chest pain. Additionally she has noted rather pronounced fatigue ongoing for the past several months.       Patient also reports intermittent episodes of vertigo for the past couple of weeks.  She notes that episodes are short lived lasting a minute or two at most.  Often brought on by head/eye movement.  No changes in hearing.  No tinnitus.  No associated focal weakness, numbness, nor tingling.  No recent fevers or chills.  Patient has not taken any medications to treat symptoms.  No recent head trauma.  Vision has been stable.    History significant for  anxiety for which she had seen her primary care provider, Dr. Medina a month ago.  At that visit her dose of Lexapro was kept at 20 mg daily.  Vistaril was added to be used on an as-needed basis.  Dr. Medina had noted in her dictation that if symptoms " were continuing to worsen that addition of mirtazapine at bedtime might be warranted.         PHQ-9 ([employer]) 2/17/2021   1.  Little interest or pleasure in doing things 1   2.  Feeling down, depressed, or hopeless 1   3.  Trouble falling or staying asleep, or sleeping too much 2   4.  Feeling tired or having little energy 2   5.  Poor appetite or overeating 2   6.  Feeling bad about yourself 1   7.  Trouble concentrating 1   8.  Moving slowly or restless 1   9.  Suicidal or self-harm thoughts 0   PHQ-9 Total Score 11   Difficulty at work, home, or with people Somewhat difficult     Patient Active Problem List   Diagnosis     Tachycardia     Allergic rhinitis     Asplenia     Hereditary spherocytosis (H)     Nonintractable migraine     Moderate major depression (H)     Panic disorder     Thrombocytosis     Past Surgical History:   Procedure Laterality Date     SPLENECTOMY       ZZC LAP,CHOLECYSTECTOMY/EXPLORE      Description: Cholecystectomy Laparoscopic;  Recorded: 10/07/2009;     ZZC REMOVAL SPLEEN, TOTAL      Description: Splenectomy;  Recorded: 12/17/2012;  Comments: 11/12 secondary to hereditary spherocytosis. Recieved pneumovax, HIB and meningococcal vaccine prior to splenectomy. Needs pneumovax every 5 yrs for 2 doses and is on erythromycin prophylaxis. If gets fever >100.5, needs white count, differential, blood culture and strong consideration of rocephin 50-75 mg/kg. Should have CXR for any pul*     ZZC REMOVAL SPLEEN, TOTAL      Description: Splenectomy;  Recorded: 09/20/2014;  Comments: 11/2012 secondary to hereditary spherocytosis. Recieved pneumovax, HIB and meningococcal vaccine prior to splenectomy. Needs pneumovax every 5 yrs for 2 doses. If gets fever >100.5, needs white count, differential, blood culture and strong consideration of rocephin 50-75 mg/kg. Should have CXR for any pulmonary symptoms. All above recomm*       Social History     Tobacco Use     Smoking status: Former Smoker     Years:  3.00     Quit date: 7/15/2020     Years since quittin.5     Smokeless tobacco: Never Used     Tobacco comment: 2 to 3 cigs a day.   Substance Use Topics     Alcohol use: Not Currently     Comment: Alcoholic Drinks/day: 1 beer every few months      Family History   Problem Relation Age of Onset     Chronic Obstructive Pulmonary Disease Mother          age 50     Hereditary Spherocytosis Mother      Diabetes Type 2  Father      Clotting Disorder Brother         Hereditary Spherocytosis     Hereditary Spherocytosis Brother          Current Outpatient Medications   Medication Sig Dispense Refill     escitalopram (LEXAPRO) 20 MG tablet Take 1 tablet (20 mg) by mouth daily 90 tablet 1     hydrOXYzine (VISTARIL) 25 MG capsule Take 1 capsule (25 mg) by mouth nightly as needed for anxiety 60 capsule 3     LORazepam (ATIVAN) 1 MG tablet TAKE 1-2 TABLETS BY MOUTH TWICE DAILY AS NEEDED FOR ANXIETY 30 tablet 3     propranolol (INDERAL) 10 MG tablet Take 1 tablet (10 mg) by mouth 3 times daily as needed (palpitations) 60 tablet 1     Allergies   Allergen Reactions     Amoxicillin      Levofloxacin Shortness Of Breath     20 minutes after levofloxacin infusion patient felt short of breath and warm.  She had some obvious facial flushing     Prochlorperazine Anxiety, Other (See Comments), Palpitations and Shortness Of Breath     Buspirone Other (See Comments)     Troubles sleeping, restless feeling.      Penicillins      Tramadol Nausea and Vomiting       ROS:   Negative except as noted above in HPI.     Objective  /80 (BP Location: Left arm, Patient Position: Sitting, Cuff Size: Adult Regular)   Pulse 93   Wt 78.5 kg (173 lb)   LMP 12/15/2021   SpO2 98%   Breastfeeding No   BMI 30.65 kg/m       General: Alert, no acute distress.   Affect: pleasant, cooperative, anxious.  Speech mildly pressured.   HEENT: Normocephalic, atraumatic, face symmetric, pupils equal round and reactive to light, external auditory canals  and tympanic membranes appear normal bilaterally.  Patient is noted to have a positive Madelin-Hallpike maneuver the right.   Neck: supple without adenopathy or thyromegaly.  Lungs: Clear to auscultation without wheezes, rales or rhonci.   Heart: regular rate and rhythm, normal S1 and S2, no murmurs        Luigi Gaitan MD   Family medicine physician  [facility]

## 2022-01-06 ENCOUNTER — OFFICE VISIT (OUTPATIENT)
Dept: FAMILY MEDICINE | Facility: CLINIC | Age: 27
End: 2022-01-06
Payer: COMMERCIAL

## 2022-01-06 VITALS
DIASTOLIC BLOOD PRESSURE: 71 MMHG | TEMPERATURE: 98 F | HEART RATE: 94 BPM | BODY MASS INDEX: 30.83 KG/M2 | WEIGHT: 174 LBS | HEIGHT: 63 IN | SYSTOLIC BLOOD PRESSURE: 113 MMHG | OXYGEN SATURATION: 98 %

## 2022-01-06 DIAGNOSIS — D58.0 HEREDITARY SPHEROCYTOSIS (H): ICD-10-CM

## 2022-01-06 DIAGNOSIS — J02.9 SORE THROAT: Primary | ICD-10-CM

## 2022-01-06 DIAGNOSIS — F32.1 MODERATE MAJOR DEPRESSION (H): ICD-10-CM

## 2022-01-06 DIAGNOSIS — H69.91 DYSFUNCTION OF RIGHT EUSTACHIAN TUBE: ICD-10-CM

## 2022-01-06 LAB — DEPRECATED S PYO AG THROAT QL EIA: NEGATIVE

## 2022-01-06 PROCEDURE — 99214 OFFICE O/P EST MOD 30 MIN: CPT | Performed by: FAMILY MEDICINE

## 2022-01-06 PROCEDURE — U0003 INFECTIOUS AGENT DETECTION BY NUCLEIC ACID (DNA OR RNA); SEVERE ACUTE RESPIRATORY SYNDROME CORONAVIRUS 2 (SARS-COV-2) (CORONAVIRUS DISEASE [COVID-19]), AMPLIFIED PROBE TECHNIQUE, MAKING USE OF HIGH THROUGHPUT TECHNOLOGIES AS DESCRIBED BY CMS-2020-01-R: HCPCS | Performed by: FAMILY MEDICINE

## 2022-01-06 PROCEDURE — 87651 STREP A DNA AMP PROBE: CPT | Performed by: FAMILY MEDICINE

## 2022-01-06 PROCEDURE — U0005 INFEC AGEN DETEC AMPLI PROBE: HCPCS | Performed by: FAMILY MEDICINE

## 2022-01-06 ASSESSMENT — MIFFLIN-ST. JEOR: SCORE: 1508.39

## 2022-01-06 NOTE — PROGRESS NOTES
Assessment & Plan     Sore throat  3-day history of URI symptoms, with significant for sore throat.  Afebrile today with normal vital signs.  Posterior pharynx is erythematous with a few scattered exudates, though rapid strep was negative.  Will await culture in the meantime treat symptomatically.  Discussed increasing hydration, NSAIDs as needed, and Cepacol lozenges.  Covid test also performed and pending.  - Symptomatic; Yes COVID-19 Virus (Coronavirus) by PCR Nose  - Streptococcus A Rapid Scr w Reflx to PCR - Lab Collect  - Group A Streptococcus PCR Throat Swab    Dysfunction of right eustachian tube  Recommended intranasal Flonase, reviewed appropriate administration technique.  No signs of infection on exam.    Hereditary spherocytosis (H)  Followed by PCP who monitors blood counts regularly.  History of splenectomy as an adolescent.    Moderate major depression (H)  Stable on Lexapro with rare Ativan use.    Arlette Lu DO  [facility]   Tonya is a 26 year old who presents for the following health issues  Chief Complaint   Patient presents with     Pharyngitis     x 3 days, sore throat, cough, right ear pain     HPI     URI symptoms :  severe right ear pain this morning, sharp. Slightly better as the day went on  Also having a cough.  Sore throat, difficult to eat due to pain.   No fevers  covid vaccine scheduled 1/17  At home covid test neg x2 today  Brother had strep a few weeks ago, white patches on his throat.  She was with him just prior to being diagnosed.  Daughter in  so always seems to have some illness.  Diarrhea yesterday, no blood in stool  Hurts to breathe, checking oxygen at home-   Use Sudafed but no other OTC medications.    Hereditary spherocytosis :  Underwent splenectomy as an adolescent due to hereditary spherocytosis. Sees primary doctor for this who follows her blood counts.     Depression :  mood stable on Lexapro. Takes  "Ativan as needed for panic attacks.      Objective    /71   Pulse 94   Temp 98  F (36.7  C) (Oral)   Ht 1.6 m (5' 3\")   Wt 78.9 kg (174 lb)   LMP 12/15/2021   SpO2 98%   BMI 30.82 kg/m    Body mass index is 30.82 kg/m .  Physical Exam   GENERAL: healthy, alert and no distress  EYES: Eyes grossly normal to inspection, PERRL and conjunctivae and sclerae normal  HENT: normal cephalic/atraumatic, ear canals and TM's normal, nasal congestion, oral mucous membranes moist, tonsillar erythema and tonsillar exudate  NECK: no adenopathy, no asymmetry, masses, or scars and thyroid normal to palpation  RESP: lungs clear to auscultation - no rales, rhonchi or wheezes  CV: regular rate and rhythm, normal S1 S2, no S3 or S4, no murmur  PSYCH: mentation appears normal, affect normal/bright    Results for orders placed or performed in visit on 01/06/22 (from the past 24 hour(s))   Streptococcus A Rapid Scr w Reflx to PCR - Lab Collect    Specimen: Throat; Swab   Result Value Ref Range    Group A Strep antigen Negative Negative         "

## 2022-01-06 NOTE — LETTER
January 6, 2022      Tonya Thayer  [address]  [place] [address]        To Whom It May Concern:    Tonya MARA Thayer  was seen on 01/06/22.  Please excuse her  until 1/10/21 due to illness.        Sincerely,        Arlette Lu, DO

## 2022-01-07 LAB
GROUP A STREP BY PCR: NOT DETECTED
SARS-COV-2 RNA RESP QL NAA+PROBE: NEGATIVE

## 2022-01-25 ENCOUNTER — OFFICE VISIT (OUTPATIENT)
Dept: FAMILY MEDICINE | Facility: CLINIC | Age: 27
End: 2022-01-25
Payer: COMMERCIAL

## 2022-01-25 ENCOUNTER — HOSPITAL ENCOUNTER (EMERGENCY)
Facility: CLINIC | Age: 27
Discharge: HOME | End: 2022-01-25
Attending: STUDENT IN AN ORGANIZED HEALTH CARE EDUCATION/TRAINING PROGRAM | Admitting: STUDENT IN AN ORGANIZED HEALTH CARE EDUCATION/TRAINING PROGRAM
Payer: COMMERCIAL

## 2022-01-25 ENCOUNTER — APPOINTMENT (OUTPATIENT)
Dept: RADIOLOGY | Facility: CLINIC | Age: 27
End: 2022-01-25
Attending: STUDENT IN AN ORGANIZED HEALTH CARE EDUCATION/TRAINING PROGRAM
Payer: COMMERCIAL

## 2022-01-25 VITALS
OXYGEN SATURATION: 96 % | HEIGHT: 64 IN | BODY MASS INDEX: 29.88 KG/M2 | SYSTOLIC BLOOD PRESSURE: 110 MMHG | DIASTOLIC BLOOD PRESSURE: 70 MMHG | HEART RATE: 104 BPM | WEIGHT: 175 LBS

## 2022-01-25 VITALS
TEMPERATURE: 98 F | WEIGHT: 178.57 LBS | RESPIRATION RATE: 20 BRPM | HEART RATE: 87 BPM | SYSTOLIC BLOOD PRESSURE: 114 MMHG | OXYGEN SATURATION: 98 % | BODY MASS INDEX: 31.63 KG/M2 | DIASTOLIC BLOOD PRESSURE: 60 MMHG

## 2022-01-25 DIAGNOSIS — R00.2 PALPITATIONS: ICD-10-CM

## 2022-01-25 DIAGNOSIS — Z00.00 ENCOUNTER FOR ROUTINE HISTORY AND PHYSICAL EXAMINATION OF ADULT: Primary | ICD-10-CM

## 2022-01-25 DIAGNOSIS — R07.9 CHEST PAIN, UNSPECIFIED TYPE: ICD-10-CM

## 2022-01-25 DIAGNOSIS — Z11.3 SCREENING FOR STDS (SEXUALLY TRANSMITTED DISEASES): ICD-10-CM

## 2022-01-25 DIAGNOSIS — F41.1 GAD (GENERALIZED ANXIETY DISORDER): ICD-10-CM

## 2022-01-25 DIAGNOSIS — Z12.4 CERVICAL CANCER SCREENING: ICD-10-CM

## 2022-01-25 LAB
ALBUMIN SERPL-MCNC: 3.9 G/DL (ref 3.5–5)
ALP SERPL-CCNC: 48 U/L (ref 45–120)
ALT SERPL W P-5'-P-CCNC: 11 U/L (ref 0–45)
ANION GAP SERPL CALCULATED.3IONS-SCNC: 8 MMOL/L (ref 5–18)
AST SERPL W P-5'-P-CCNC: 12 U/L (ref 0–40)
ATRIAL RATE - MUSE: 81 BPM
BASOPHILS # BLD AUTO: 0 10E3/UL (ref 0–0.2)
BASOPHILS NFR BLD AUTO: 0 %
BILIRUB SERPL-MCNC: 0.7 MG/DL (ref 0–1)
BUN SERPL-MCNC: 10 MG/DL (ref 8–22)
CALCIUM SERPL-MCNC: 9.1 MG/DL (ref 8.5–10.5)
CHLORIDE BLD-SCNC: 105 MMOL/L (ref 98–107)
CO2 SERPL-SCNC: 25 MMOL/L (ref 22–31)
CREAT SERPL-MCNC: 0.58 MG/DL (ref 0.6–1.1)
DIASTOLIC BLOOD PRESSURE - MUSE: 81 MMHG
EOSINOPHIL # BLD AUTO: 0.1 10E3/UL (ref 0–0.7)
EOSINOPHIL NFR BLD AUTO: 1 %
ERYTHROCYTE [DISTWIDTH] IN BLOOD BY AUTOMATED COUNT: 12.8 % (ref 10–15)
GFR SERPL CREATININE-BSD FRML MDRD: >90 ML/MIN/1.73M2
GLUCOSE BLD-MCNC: 85 MG/DL (ref 70–125)
HCT VFR BLD AUTO: 37 % (ref 35–47)
HGB BLD-MCNC: 12.8 G/DL (ref 11.7–15.7)
IMM GRANULOCYTES # BLD: 0.1 10E3/UL
IMM GRANULOCYTES NFR BLD: 1 %
INTERPRETATION ECG - MUSE: NORMAL
LYMPHOCYTES # BLD AUTO: 2.9 10E3/UL (ref 0.8–5.3)
LYMPHOCYTES NFR BLD AUTO: 23 %
MCH RBC QN AUTO: 30.5 PG (ref 26.5–33)
MCHC RBC AUTO-ENTMCNC: 34.6 G/DL (ref 31.5–36.5)
MCV RBC AUTO: 88 FL (ref 78–100)
MONOCYTES # BLD AUTO: 0.9 10E3/UL (ref 0–1.3)
MONOCYTES NFR BLD AUTO: 7 %
NEUTROPHILS # BLD AUTO: 9 10E3/UL (ref 1.6–8.3)
NEUTROPHILS NFR BLD AUTO: 68 %
NRBC # BLD AUTO: 0 10E3/UL
NRBC BLD AUTO-RTO: 0 /100
P AXIS - MUSE: 64 DEGREES
PLATELET # BLD AUTO: 562 10E3/UL (ref 150–450)
POTASSIUM BLD-SCNC: 4.1 MMOL/L (ref 3.5–5)
PR INTERVAL - MUSE: 138 MS
PROT SERPL-MCNC: 7.2 G/DL (ref 6–8)
QRS DURATION - MUSE: 86 MS
QT - MUSE: 358 MS
QTC - MUSE: 415 MS
R AXIS - MUSE: 65 DEGREES
RBC # BLD AUTO: 4.19 10E6/UL (ref 3.8–5.2)
SARS-COV-2 RNA RESP QL NAA+PROBE: NEGATIVE
SODIUM SERPL-SCNC: 138 MMOL/L (ref 136–145)
SYSTOLIC BLOOD PRESSURE - MUSE: 138 MMHG
T AXIS - MUSE: 20 DEGREES
TROPONIN I SERPL-MCNC: <0.01 NG/ML (ref 0–0.29)
VENTRICULAR RATE- MUSE: 81 BPM
WBC # BLD AUTO: 13 10E3/UL (ref 4–11)

## 2022-01-25 PROCEDURE — 87491 CHLMYD TRACH DNA AMP PROBE: CPT | Performed by: FAMILY MEDICINE

## 2022-01-25 PROCEDURE — 84484 ASSAY OF TROPONIN QUANT: CPT | Performed by: STUDENT IN AN ORGANIZED HEALTH CARE EDUCATION/TRAINING PROGRAM

## 2022-01-25 PROCEDURE — 99395 PREV VISIT EST AGE 18-39: CPT | Performed by: FAMILY MEDICINE

## 2022-01-25 PROCEDURE — 99285 EMERGENCY DEPT VISIT HI MDM: CPT | Mod: 25

## 2022-01-25 PROCEDURE — 85025 COMPLETE CBC W/AUTO DIFF WBC: CPT | Performed by: STUDENT IN AN ORGANIZED HEALTH CARE EDUCATION/TRAINING PROGRAM

## 2022-01-25 PROCEDURE — G0123 SCREEN CERV/VAG THIN LAYER: HCPCS | Performed by: FAMILY MEDICINE

## 2022-01-25 PROCEDURE — 36415 COLL VENOUS BLD VENIPUNCTURE: CPT | Performed by: STUDENT IN AN ORGANIZED HEALTH CARE EDUCATION/TRAINING PROGRAM

## 2022-01-25 PROCEDURE — 96375 TX/PRO/DX INJ NEW DRUG ADDON: CPT

## 2022-01-25 PROCEDURE — 87591 N.GONORRHOEAE DNA AMP PROB: CPT | Performed by: FAMILY MEDICINE

## 2022-01-25 PROCEDURE — 250N000009 HC RX 250: Performed by: STUDENT IN AN ORGANIZED HEALTH CARE EDUCATION/TRAINING PROGRAM

## 2022-01-25 PROCEDURE — 250N000011 HC RX IP 250 OP 636: Performed by: STUDENT IN AN ORGANIZED HEALTH CARE EDUCATION/TRAINING PROGRAM

## 2022-01-25 PROCEDURE — 93005 ELECTROCARDIOGRAM TRACING: CPT | Performed by: EMERGENCY MEDICINE

## 2022-01-25 PROCEDURE — 87635 SARS-COV-2 COVID-19 AMP PRB: CPT | Performed by: STUDENT IN AN ORGANIZED HEALTH CARE EDUCATION/TRAINING PROGRAM

## 2022-01-25 PROCEDURE — 250N000013 HC RX MED GY IP 250 OP 250 PS 637: Performed by: STUDENT IN AN ORGANIZED HEALTH CARE EDUCATION/TRAINING PROGRAM

## 2022-01-25 PROCEDURE — 71046 X-RAY EXAM CHEST 2 VIEWS: CPT

## 2022-01-25 PROCEDURE — 80053 COMPREHEN METABOLIC PANEL: CPT | Performed by: STUDENT IN AN ORGANIZED HEALTH CARE EDUCATION/TRAINING PROGRAM

## 2022-01-25 PROCEDURE — 96374 THER/PROPH/DIAG INJ IV PUSH: CPT

## 2022-01-25 RX ORDER — ONDANSETRON 2 MG/ML
4 INJECTION INTRAMUSCULAR; INTRAVENOUS ONCE
Status: COMPLETED | OUTPATIENT
Start: 2022-01-25 | End: 2022-01-25

## 2022-01-25 RX ORDER — LORAZEPAM 1 MG/1
TABLET ORAL
Qty: 30 TABLET | Refills: 3 | Status: SHIPPED | OUTPATIENT
Start: 2022-01-25 | End: 2022-06-07

## 2022-01-25 RX ORDER — PROPRANOLOL HYDROCHLORIDE 10 MG/1
10 TABLET ORAL 3 TIMES DAILY PRN
Qty: 60 TABLET | Refills: 1 | Status: SHIPPED | OUTPATIENT
Start: 2022-01-25 | End: 2022-04-04

## 2022-01-25 RX ORDER — ONDANSETRON 4 MG/1
4 TABLET, ORALLY DISINTEGRATING ORAL EVERY 8 HOURS PRN
Qty: 10 TABLET | Refills: 0 | Status: SHIPPED | OUTPATIENT
Start: 2022-01-25 | End: 2022-01-28

## 2022-01-25 RX ORDER — KETOROLAC TROMETHAMINE 15 MG/ML
15 INJECTION, SOLUTION INTRAMUSCULAR; INTRAVENOUS ONCE
Status: COMPLETED | OUTPATIENT
Start: 2022-01-25 | End: 2022-01-25

## 2022-01-25 RX ADMIN — ONDANSETRON 4 MG: 2 INJECTION INTRAMUSCULAR; INTRAVENOUS at 01:37

## 2022-01-25 RX ADMIN — KETOROLAC TROMETHAMINE 15 MG: 15 INJECTION, SOLUTION INTRAMUSCULAR; INTRAVENOUS at 01:11

## 2022-01-25 ASSESSMENT — ANXIETY QUESTIONNAIRES
1. FEELING NERVOUS, ANXIOUS, OR ON EDGE: NEARLY EVERY DAY
7. FEELING AFRAID AS IF SOMETHING AWFUL MIGHT HAPPEN: MORE THAN HALF THE DAYS
GAD7 TOTAL SCORE: 19
6. BECOMING EASILY ANNOYED OR IRRITABLE: NEARLY EVERY DAY
GAD7 TOTAL SCORE: 19
2. NOT BEING ABLE TO STOP OR CONTROL WORRYING: NEARLY EVERY DAY
5. BEING SO RESTLESS THAT IT IS HARD TO SIT STILL: NEARLY EVERY DAY
3. WORRYING TOO MUCH ABOUT DIFFERENT THINGS: MORE THAN HALF THE DAYS
7. FEELING AFRAID AS IF SOMETHING AWFUL MIGHT HAPPEN: MORE THAN HALF THE DAYS
GAD7 TOTAL SCORE: 19
4. TROUBLE RELAXING: NEARLY EVERY DAY

## 2022-01-25 ASSESSMENT — MIFFLIN-ST. JEOR: SCORE: 1532.76

## 2022-01-25 ASSESSMENT — PATIENT HEALTH QUESTIONNAIRE - PHQ9
10. IF YOU CHECKED OFF ANY PROBLEMS, HOW DIFFICULT HAVE THESE PROBLEMS MADE IT FOR YOU TO DO YOUR WORK, TAKE CARE OF THINGS AT HOME, OR GET ALONG WITH OTHER PEOPLE: EXTREMELY DIFFICULT
SUM OF ALL RESPONSES TO PHQ QUESTIONS 1-9: 13
SUM OF ALL RESPONSES TO PHQ QUESTIONS 1-9: 13

## 2022-01-25 NOTE — ED PROVIDER NOTES
Emergency Department Encounter         FINAL IMPRESSION:  Chest pain        ED COURSE AND MEDICAL DECISION MAKING       ED Course as of 01/26/22 2017   Tue Jan 25, 2022   0045 EKG sinus with a rate of 81, patient with no signs of ischemia, no STEMI, no malignant arrhythmias, .  Compared to old EKG, unchanged.   0057 Patient is a 26-year-old healthy female with a history of panic disorder, here with chest pain.  Patient states she was getting ready for bed tonight when she had anterior chest pressure.  States she felt nauseous and went to the bathroom and vomited x1 with no black or blood.  States he went back into bed and stated feeling a panic attack and she took one of her anxiety medications.  States the chest pain persisted as well as nausea and she came here for evaluation.  Denies any fevers chills nausea vomiting this week.  Is been doing well otherwise.  PERC criteria negative.  No leg swelling.  On arrival she looks well peer vitals are stable.  Heart and lungs normal.  No significant pain to palpation of the anterior chest wall.  No rash.  No trauma history.  Plan for brief cardiopulmonary evaluation, chest x-ray, Toradol GI cocktail and reevaluate.  PERC criteria negative.   0202 Repeat evaluation showing patient states she is feeling much better.  No chest pain.  Pressure is gotten better with Zofran and Toradol.  Chest x-ray pending     -Patient states he feels much better.  Plan for discharge home.  Chest x-ray clear.  Patient states he has an appointment next week with cardiology                   At the conclusion of the encounter I discussed the results of all the tests and the disposition. The questions were answered. The patient or family acknowledged understanding and was agreeable with the care plan.                  MEDICATIONS GIVEN IN THE EMERGENCY DEPARTMENT:  Medications   lidocaine (viscous) (XYLOCAINE) 2 % 15 mL, alum & mag hydroxide-simethicone (MAALOX) 15 mL GI Cocktail (has no  administration in time range)   ketorolac (TORADOL) injection 15 mg (has no administration in time range)       NEW PRESCRIPTIONS STARTED AT TODAY'S ED VISIT:  New Prescriptions    No medications on file       HPI     Patient is a 26-year-old female with history of asplenia, anxiety, here with chest pressure.  States it began 1 hour prior to arrival before she was getting ready for bed.  Associated one episode of nausea and vomiting.  States her nausea is persisted.  No trouble breathing.  No fevers.  No leg swelling.          REVIEW OF SYSTEMS:  Review of Systems   Constitutional: Negative for fever, malaise  HEENT: Negative runny nose, sore throat, ear pain, neck pain  Respiratory: Negative for shortness of breath, cough, congestion  Cardiovascular: Negative for chest pain, leg edema  Gastrointestinal: Negative for abdominal distention, abdominal pain, constipation, vomiting, nausea, diarrhea  Genitourinary: Negative for dysuria and hematuria.   Integument: Negative for rash, skin breakdown  Neurological: Negative for paresthesias, weakness, headache.  Musculoskeletal: Negative for joint pain, joint swelling      All other systems reviewed and are negative.          MEDICAL HISTORY     Past Medical History:   Diagnosis Date     Anxiety      Asplenia      Cocaine use      Depression      Fever and chills      Hereditary spherocytosis (H)      Leukocytosis 7/18/2016     Pyelonephritis 11/15/2016     Sepsis, due to unspecified organism      SIRS (systemic inflammatory response syndrome) (H) 10/12/2019     Spherocytosis (H)      UTI (urinary tract infection)        Past Surgical History:   Procedure Laterality Date     SPLENECTOMY       ZZC LAP,CHOLECYSTECTOMY/EXPLORE      Description: Cholecystectomy Laparoscopic;  Recorded: 10/07/2009;     ZZC REMOVAL SPLEEN, TOTAL      Description: Splenectomy;  Recorded: 12/17/2012;  Comments: 11/12 secondary to hereditary spherocytosis. Recieved pneumovax, HIB and meningococcal  vaccine prior to splenectomy. Needs pneumovax every 5 yrs for 2 doses and is on erythromycin prophylaxis. If gets fever >100.5, needs white count, differential, blood culture and strong consideration of rocephin 50-75 mg/kg. Should have CXR for any pul*     ZZC REMOVAL SPLEEN, TOTAL      Description: Splenectomy;  Recorded: 2014;  Comments: 2012 secondary to hereditary spherocytosis. Recieved pneumovax, HIB and meningococcal vaccine prior to splenectomy. Needs pneumovax every 5 yrs for 2 doses. If gets fever >100.5, needs white count, differential, blood culture and strong consideration of rocephin 50-75 mg/kg. Should have CXR for any pulmonary symptoms. All above recomm*       Social History     Tobacco Use     Smoking status: Former Smoker     Years: 3.00     Quit date: 7/15/2020     Years since quittin.5     Smokeless tobacco: Never Used     Tobacco comment: 2 to 3 cigs a day.   Substance Use Topics     Alcohol use: Not Currently     Comment: Alcoholic Drinks/day: 1 beer every few months      Drug use: Not Currently       escitalopram (LEXAPRO) 20 MG tablet  hydrOXYzine (VISTARIL) 25 MG capsule  LORazepam (ATIVAN) 1 MG tablet  mirtazapine (REMERON) 15 MG tablet  ondansetron (ZOFRAN) 4 MG tablet            PHYSICAL EXAM     /81   Pulse 80   Temp 98  F (36.7  C) (Oral)   Resp 21   Wt 81 kg (178 lb 9.2 oz)   SpO2 97%   BMI 31.63 kg/m        PHYSICAL EXAM:     General: Patient appears well, nontoxic, comfortable  HEENT: Moist mucous membranes, no tongue swelling.  No head trauma.  No midline neck pain.  Cardiovascular: Normal rate, normal rhythm, no extremity edema.  No appreciable murmur.  Respiratory: No signs of respiratory distress, lungs are clear to auscultation bilaterally with no wheezes rhonchi or rales.  Abdominal: Soft, nontender, nondistended, no palpable masses, no guarding, no rebound  Musculoskeletal: Full range of motion of joints, no deformities appreciated.  Neurological:  Alert and oriented, grossly neurologically intact.  Psychological: Normal affect and mood.  Integument: No rashes appreciated          RESULTS       Labs Ordered and Resulted from Time of ED Arrival to Time of ED Departure - No data to display    Chest XR,  PA & LAT    (Results Pending)                     PROCEDURES:  Procedures:  Procedures           Terry Rosenbaum DO  Emergency Medicine  [facility] EMERGENCY ROOM     Terry Rosenbaum DO  01/26/22 2018

## 2022-01-25 NOTE — PROGRESS NOTES
SUBJECTIVE:   CC: Tonya Thayer is an 26 year old woman who presents for preventive health visit.       Patient has been advised of split billing requirements and indicates understanding: Yes  Healthy Habits:     Getting at least 3 servings of Calcium per day:  NO    Bi-annual eye exam:  NO    Dental care twice a year:  Yes    Sleep apnea or symptoms of sleep apnea:  Daytime drowsiness    Diet:  Other    Frequency of exercise:  1 day/week    Duration of exercise:  15-30 minutes    Taking medications regularly:  Yes    Medication side effects:  None    PHQ-2 Total Score: 3    Additional concerns today:  No    She is here today for a follow up. She went in for her heart issues last nighta s well. She does have an appointment next week with the cardiology. She does just wants to feel normal again. She does feel like this has been since her covid infection. Last night she was relaxed, watching a movie, took one as she had a feeling of her heart feeling funny. Didn't help, then went to take a bath, didn't help, so she took another 1/2 one, then didn't get better. Still had high heart rate, threw up and didn't want to go to the ER, took another 1/2 lorazepam and that didn't help. She has woken up out of a dead sleep and hat her heart rate 190.     She has been takign her lorazepam more as well due to her symptoms. She does feel that otherwise she is doing really well.       Today's PHQ-2 Score:   PHQ-2 ( 1999 [employer]) 1/25/2022   Q1: Little interest or pleasure in doing things 2   Q2: Feeling down, depressed or hopeless 1   PHQ-2 Score 3   Q1: Little interest or pleasure in doing things More than half the days   Q2: Feeling down, depressed or hopeless Several days   PHQ-2 Score 3       Abuse: Current or Past (Physical, Sexual or Emotional) - No  Do you feel safe in your environment? Yes    Have you ever done Advance Care Planning? (For example, a Health Directive, POLST, or a discussion with a medical provider or  "your loved ones about your wishes): No, advance care planning information given to patient to review.  Patient declined advance care planning discussion at this time.    Social History     Tobacco Use     Smoking status: Former Smoker     Years: 3.00     Quit date: 7/15/2020     Years since quittin.5     Smokeless tobacco: Never Used     Tobacco comment: 2 to 3 cigs a day.   Substance Use Topics     Alcohol use: Not Currently     Comment: Alcoholic Drinks/day: 1 beer every few months      If you drink alcohol do you typically have >3 drinks per day or >7 drinks per week? No    Alcohol Use 2022   Prescreen: >3 drinks/day or >7 drinks/week? No   Prescreen: >3 drinks/day or >7 drinks/week? -       Reviewed orders with patient.  Reviewed health maintenance and updated orders accordingly - Yes      Breast Cancer Screening:        History of abnormal Pap smear: NO - age 21-29 PAP every 3 years recommended  PAP / HPV Latest Ref Rng & Units 2018   PAP Negative for squamous intraepithelial lesion or malignancy. Negative for squamous intraepithelial lesion or malignancy  Electronically signed by Komal Ricketts CT (ASCP) on 2018 at  3:28 PM       Reviewed and updated as needed this visit by clinical staff  Tobacco  Allergies  Meds  Problems  Med Hx  Surg Hx  Fam Hx         Reviewed and updated as needed this visit by Provider  Tobacco  Allergies  Meds  Problems  Med Hx  Surg Hx  Fam Hx          Review of Systems  Per above     OBJECTIVE:   /70 (BP Location: Right arm, Patient Position: Sitting, Cuff Size: Adult Large)   Pulse 104   Ht 1.632 m (5' 4.25\")   Wt 79.4 kg (175 lb)   LMP 2022   SpO2 96%   Breastfeeding No   BMI 29.81 kg/m    Physical Exam  GENERAL: healthy, alert and no distress  EYES: Eyes grossly normal to inspection, PERRL and conjunctivae and sclerae normal  HENT: ear canals and TM's normal, nose and mouth without ulcers or lesions  NECK: no adenopathy, " no asymmetry, masses, or scars and thyroid normal to palpation  RESP: lungs clear to auscultation - no rales, rhonchi or wheezes  BREAST: normal without masses, tenderness or nipple discharge and no palpable axillary masses or adenopathy  CV: regular rate and rhythm, normal S1 S2, no S3 or S4, no murmur, click or rub, no peripheral edema and peripheral pulses strong  ABDOMEN: soft, nontender, no hepatosplenomegaly, no masses and bowel sounds normal   (female): normal female external genitalia, normal urethral meatus, vaginal mucosa pink, moist, well rugated, and normal cervix without masses or discharge  MS: no gross musculoskeletal defects noted, no edema  SKIN: no suspicious lesions or rashes  NEURO: Normal strength and tone, mentation intact and speech normal  PSYCH: mentation appears normal, affect normal/bright        ASSESSMENT/PLAN:   Tonya was seen today for physical.    Diagnoses and all orders for this visit:    Encounter for routine history and physical examination of adult   Routine health maintenance discussion:  No smoking, limited alcohol (7 or less servings per week), 5 fruits/veg servings per day, 200 minutes of exercise per week.  Daily calcium/vitamin D guidelines, bone health, colon cancer screening beginning at age 50.  Accident avoidance, sun screen.     ZION (generalized anxiety disorder)  -     LORazepam (ATIVAN) 1 MG tablet; TAKE 1-2 TABLETS BY MOUTH TWICE DAILY AS NEEDED FOR ANXIETY   CSA updated 11/2021   Continue with as needed use for now and f/u in six months if improving    Palpitations  -     propranolol (INDERAL) 10 MG tablet; Take 1 tablet (10 mg) by mouth 3 times daily as needed (palpitations)   Will restart low dose propranolol at bedtime, can take up to three times a day but it did make her tired when she was taking this initially.     Screening for STDs (sexually transmitted diseases)  -     NEISSERIA GONORRHOEA PCR  -     CHLAMYDIA TRACHOMATIS PCR    Cervical cancer  "screening  -     Pap Screen only - recommended age 21 - 24 years        Patient has been advised of split billing requirements and indicates understanding: Yes    COUNSELING:  Reviewed preventive health counseling, as reflected in patient instructions       Regular exercise       Healthy diet/nutrition       Contraception       Safe sex practices/STD prevention       Advance Care Planning    Estimated body mass index is 29.81 kg/m  as calculated from the following:    Height as of this encounter: 1.632 m (5' 4.25\").    Weight as of this encounter: 79.4 kg (175 lb).    Weight management plan: Discussed healthy diet and exercise guidelines    She reports that she quit smoking about 18 months ago. She quit after 3.00 years of use. She has never used smokeless tobacco.      Counseling Resources:  ATP IV Guidelines  Pooled Cohorts Equation Calculator  Breast Cancer Risk Calculator  BRCA-Related Cancer Risk Assessment: FHS-7 Tool  FRAX Risk Assessment  ICSI Preventive Guidelines  Dietary Guidelines for Americans, 2010  QThru's MyPlate  ASA Prophylaxis  Lung CA Screening    Michelle Medina MD  [facility]  Answers for HPI/ROS submitted by the patient on 1/25/2022  If you checked off any problems, how difficult have these problems made it for you to do your work, take care of things at home, or get along with other people?: Extremely difficult  PHQ9 TOTAL SCORE: 13  ZION 7 TOTAL SCORE: 19      "

## 2022-01-25 NOTE — ED TRIAGE NOTES
Pt reports generalized chest pain/tightness with SOB beginning one hour prior; pt reports suddenly developing nausea and having numerous episodes of emesis.

## 2022-01-26 LAB
BKR LAB AP GYN ADEQUACY: NORMAL
BKR LAB AP GYN INTERPRETATION: NORMAL
BKR LAB AP HPV REFLEX: NO
BKR LAB AP PREVIOUS ABNORMAL: NORMAL
C TRACH DNA SPEC QL NAA+PROBE: NEGATIVE
N GONORRHOEA DNA SPEC QL NAA+PROBE: NEGATIVE
PATH REPORT.COMMENTS IMP SPEC: NORMAL
PATH REPORT.COMMENTS IMP SPEC: NORMAL
PATH REPORT.RELEVANT HX SPEC: NORMAL

## 2022-01-26 ASSESSMENT — PATIENT HEALTH QUESTIONNAIRE - PHQ9: SUM OF ALL RESPONSES TO PHQ QUESTIONS 1-9: 13

## 2022-01-26 ASSESSMENT — ANXIETY QUESTIONNAIRES: GAD7 TOTAL SCORE: 19

## 2022-01-28 ENCOUNTER — MYC MEDICAL ADVICE (OUTPATIENT)
Dept: FAMILY MEDICINE | Facility: CLINIC | Age: 27
End: 2022-01-28
Payer: COMMERCIAL

## 2022-02-01 ENCOUNTER — OFFICE VISIT (OUTPATIENT)
Dept: CARDIOLOGY | Facility: CLINIC | Age: 27
End: 2022-02-01
Attending: FAMILY MEDICINE
Payer: COMMERCIAL

## 2022-02-01 VITALS
SYSTOLIC BLOOD PRESSURE: 104 MMHG | BODY MASS INDEX: 31.34 KG/M2 | WEIGHT: 176.9 LBS | HEART RATE: 88 BPM | HEIGHT: 63 IN | RESPIRATION RATE: 16 BRPM | DIASTOLIC BLOOD PRESSURE: 70 MMHG

## 2022-02-01 DIAGNOSIS — R07.9 INTERMITTENT CHEST PAIN: ICD-10-CM

## 2022-02-01 DIAGNOSIS — R07.2 PRECORDIAL PAIN: ICD-10-CM

## 2022-02-01 DIAGNOSIS — R00.2 PALPITATIONS: Primary | ICD-10-CM

## 2022-02-01 PROCEDURE — 99204 OFFICE O/P NEW MOD 45 MIN: CPT | Performed by: INTERNAL MEDICINE

## 2022-02-01 ASSESSMENT — MIFFLIN-ST. JEOR: SCORE: 1521.54

## 2022-02-01 NOTE — PROGRESS NOTES
[facility] HEART CARE [address], [place] [address]   [email or web address]   OFFICE: [phone]          Thank you Dr. Gaitan for asking the [facility] Heart Care team to participate in the care of your patient, Tonya Thayer.     Impression and Plan     1.  Chest discomfort.  I agree with Dr. Terry Rosenbaum who had seen Tonya in the Emergency Department on 25 January 2022 that her symptoms are somewhat atypical for cardiac etiology, ischemic or otherwise.  Also as noted below, Tonya was seen later in the morning that same day on 25 January 2022 by primary provider, Dr. Michelle Medina.  She had reported not feeling her normal self ever since she had suffered a Covid infection.  Assessment by Dr. Medina lists generalized anxiety disorder as primary problem.  I agree with both Dr. Rosenbaum and Dr. Medina that some features do seem to have a component of anxiety.  Nonetheless, do feel would be prudent and worthwhile to pursue a bit further.  Specifically, recommend:    Plan to obtain echocardiogram to exclude any evidence of structural heart disease/evidence of reduced LV function post Covid infection.    Will obtain repeat one-patch monitor for a week to further define heart rhythm and rate and exclude any worrisome rhythm disturbances.    Follow-up and further recommendations pending test results.    35 minutes spent reviewing prior records (including documentation, laboratory studies, cardiac testing/imaging), interview with patient along with physical exam, planning, and subsequent documentation/crafting of note.       History of Present Illness    Once again I would like to thank you again for asking me to participate in the care of your patient, Tonya Thayer.  As you know, but to reiterate for my own records, Tonya Thayer is a 26 year old female who had presented to the emergency department 25 January 2022 with symptoms of chest pressure.   "She was seen by Dr. Terry Rosenbaum.  She had been preparing to go to bed when she developed anterior \"chest pressure\".  Also felt nauseated and did go to the washroom and had emesis x1.  She had gone back to bed and stated as though she was feeling a \"panic attack\" and had taken one of her anxiolytics.  Chest discomfort and nausea, however, persisted and she presented to the emergency department.    She was seen later in the morning that same day on 25 January 2022 by primary provider, Dr. Michelle Medina.  She had reported not feeling her normal self ever since she had suffered a Covid infection.  Assessment by Dr. Medina list generalized anxiety disorder as primary problem.    On interview today, Tonya still has intermittent subjective episodes of inordinately rapid heart rate.  She continues to report some intermittent chest discomfort symptoms though not necessarily provoked with exertion.  She does indicate that when she was started on propranolol after her last evaluation and this did seem to ameliorate her subjective rapid heart rate somewhat though still is having intermittent bothersome episodes.  Denies shortness of breath at night to suggest PND/orthopnea.  She feels many of her symptoms are residual from after having contracted Covid.  Denies presently any fevers, chills, or other constitutional symptoms.    Further review of systems is otherwise negative/noncontributory (medical record and 13 point review of systems reviewed as well and pertinent positives noted).       Cardiac Diagnostics     Ambulatory monitor x10 days January 2021:  1. Sinus rhythm and sinus tachycardia, without significant atrial ventricular arrhythmias.  2. Symptomatic transmissions detailed above, correlated with sinus rhythm and sinus tachycardia.    Twelve-lead ECG (personally reviewed) 25 January 2022: Sinus rhythm.  Normal ECG.         Physical Examination       /70 (BP Location: Left arm, Patient Position: Sitting, " "Cuff Size: Adult Regular)   Pulse 88   Resp 16   Ht 1.6 m (5' 3\")   Wt 80.2 kg (176 lb 14.4 oz)   LMP 01/11/2022   BMI 31.34 kg/m          Wt Readings from Last 3 Encounters:   02/01/22 80.2 kg (176 lb 14.4 oz)   01/25/22 79.4 kg (175 lb)   01/25/22 81 kg (178 lb 9.2 oz)     The patient is alert and oriented times three. Sclerae are anicteric. Mucosal membranes are moist. Jugular venous pressure is normal. No significant adenopathy/thyromegally appreciated. Lungs are clear with good expansion. On cardiovascular exam, the patient has a regular S1 and S2. Abdomen is soft and non-tender. Extremities reveal no clubbing, cyanosis, or edema.       Imaging     Chest radiograph 25 January 2022:  1. No evidence of active cardiopulmonary disease.         Family History/Social History/Risk Factors   Patient does not smoke.  Family history reviewed, and family history includes Chronic Obstructive Pulmonary Disease in her mother; Clotting Disorder in her brother; Diabetes Type 2  in her father; Hereditary Spherocytosis in her brother and mother.        Medical History  Surgical History Family History Social History   Past Medical History:   Diagnosis Date     Anxiety      Asplenia      Cocaine use      Depression      Fever and chills      Hereditary spherocytosis (H)      Leukocytosis 7/18/2016     Pyelonephritis 11/15/2016     Sepsis, due to unspecified organism      SIRS (systemic inflammatory response syndrome) (H) 10/12/2019     Spherocytosis (H)      UTI (urinary tract infection)      Past Surgical History:   Procedure Laterality Date     SPLENECTOMY       ZZC LAP,CHOLECYSTECTOMY/EXPLORE      Description: Cholecystectomy Laparoscopic;  Recorded: 10/07/2009;     ZZC REMOVAL SPLEEN, TOTAL      Description: Splenectomy;  Recorded: 12/17/2012;  Comments: 11/12 secondary to hereditary spherocytosis. Recieved pneumovax, HIB and meningococcal vaccine prior to splenectomy. Needs pneumovax every 5 yrs for 2 doses and is on " erythromycin prophylaxis. If gets fever >100.5, needs white count, differential, blood culture and strong consideration of rocephin 50-75 mg/kg. Should have CXR for any pul*     ZZC REMOVAL SPLEEN, TOTAL      Description: Splenectomy;  Recorded: 2014;  Comments: 2012 secondary to hereditary spherocytosis. Recieved pneumovax, HIB and meningococcal vaccine prior to splenectomy. Needs pneumovax every 5 yrs for 2 doses. If gets fever >100.5, needs white count, differential, blood culture and strong consideration of rocephin 50-75 mg/kg. Should have CXR for any pulmonary symptoms. All above recomm*     Family History   Problem Relation Age of Onset     Chronic Obstructive Pulmonary Disease Mother          age 50     Hereditary Spherocytosis Mother      Diabetes Type 2  Father      Clotting Disorder Brother         Hereditary Spherocytosis     Hereditary Spherocytosis Brother         Social History     Socioeconomic History     Marital status: Single     Spouse name: Not on file     Number of children: Not on file     Years of education: Not on file     Highest education level: Not on file   Occupational History     Not on file   Tobacco Use     Smoking status: Former Smoker     Years: 3.00     Quit date: 7/15/2020     Years since quittin.5     Smokeless tobacco: Never Used     Tobacco comment: 2 to 3 cigs a day.   Substance and Sexual Activity     Alcohol use: Not Currently     Comment: Alcoholic Drinks/day: 1 beer every few months      Drug use: Not Currently     Sexual activity: Not Currently     Partners: Male   Other Topics Concern     Not on file   Social History Narrative    Lives at home with mother and brother.     Social Determinants of Health     Financial Resource Strain: Not on file   Food Insecurity: Not on file   Transportation Needs: Not on file   Physical Activity: Not on file   Stress: Not on file   Social Connections: Not on file   Intimate Partner Violence: Not on file   Housing  Stability: Not on file           Medications  Allergies   Current Outpatient Medications   Medication Sig Dispense Refill     escitalopram (LEXAPRO) 20 MG tablet Take 1 tablet (20 mg) by mouth daily 90 tablet 1     hydrOXYzine (VISTARIL) 25 MG capsule Take 1 capsule (25 mg) by mouth nightly as needed for anxiety 60 capsule 3     LORazepam (ATIVAN) 1 MG tablet TAKE 1-2 TABLETS BY MOUTH TWICE DAILY AS NEEDED FOR ANXIETY 30 tablet 3     propranolol (INDERAL) 10 MG tablet Take 1 tablet (10 mg) by mouth 3 times daily as needed (palpitations) 60 tablet 1       Allergies   Allergen Reactions     Amoxicillin      Levofloxacin Shortness Of Breath     20 minutes after levofloxacin infusion patient felt short of breath and warm.  She had some obvious facial flushing     Prochlorperazine Anxiety, Other (See Comments), Palpitations and Shortness Of Breath     Buspirone Other (See Comments)     Troubles sleeping, restless feeling.      Penicillins      Tramadol Nausea and Vomiting          Lab Results    Chemistry/lipid CBC Cardiac Enzymes/BNP/TSH/INR   Recent Labs   Lab Test 09/13/17  1420   CHOL 222*   HDL 56   *   TRIG 57     Recent Labs   Lab Test 09/13/17  1420   *     Recent Labs   Lab Test 01/25/22  0110      POTASSIUM 4.1   CHLORIDE 105   CO2 25   GLC 85   BUN 10   CR 0.58*   GFRESTIMATED >90   NOMI 9.1     Recent Labs   Lab Test 01/25/22  0110 10/23/21  1950 10/19/21  1535   CR 0.58* 0.75 0.60     No results for input(s): A1C in the last 92550 hours.       Recent Labs   Lab Test 01/25/22  0110   WBC 13.0*   HGB 12.8   HCT 37.0   MCV 88   *     Recent Labs   Lab Test 01/25/22  0110 10/23/21  1950 10/19/21  1535   HGB 12.8 12.4 12.9    Recent Labs   Lab Test 01/25/22  0110 12/09/20  0426   TROPONINI <0.01 <0.01     No results for input(s): BNP, NTBNPI, NTBNP in the last 78990 hours.  Recent Labs   Lab Test 10/19/21  1535   TSH 0.76     Recent Labs   Lab Test 10/12/18  0621 10/11/18  1745  10/03/18  1305   INR 1.03 0.94 0.96          Medications  Allergies   Current Outpatient Medications   Medication Sig Dispense Refill     escitalopram (LEXAPRO) 20 MG tablet Take 1 tablet (20 mg) by mouth daily 90 tablet 1     hydrOXYzine (VISTARIL) 25 MG capsule Take 1 capsule (25 mg) by mouth nightly as needed for anxiety 60 capsule 3     LORazepam (ATIVAN) 1 MG tablet TAKE 1-2 TABLETS BY MOUTH TWICE DAILY AS NEEDED FOR ANXIETY 30 tablet 3     propranolol (INDERAL) 10 MG tablet Take 1 tablet (10 mg) by mouth 3 times daily as needed (palpitations) 60 tablet 1      Allergies   Allergen Reactions     Amoxicillin      Levofloxacin Shortness Of Breath     20 minutes after levofloxacin infusion patient felt short of breath and warm.  She had some obvious facial flushing     Prochlorperazine Anxiety, Other (See Comments), Palpitations and Shortness Of Breath     Buspirone Other (See Comments)     Troubles sleeping, restless feeling.      Penicillins      Tramadol Nausea and Vomiting        Lab Results   Lab Results   Component Value Date     01/25/2022     02/17/2018    CO2 25 01/25/2022    CO2 24 02/17/2018    BUN 10 01/25/2022    BUN 5 02/17/2018     Lab Results   Component Value Date    WBC 13.0 01/25/2022    WBC 5.5 02/16/2018    HGB 12.8 01/25/2022    HGB 11.6 02/16/2018    HCT 37.0 01/25/2022    HCT 33.0 02/16/2018    MCV 88 01/25/2022    MCV 88 02/16/2018     01/25/2022     02/16/2018     Lab Results   Component Value Date    CHOL 222 09/13/2017    TRIG 57 09/13/2017    HDL 56 09/13/2017     Lab Results   Component Value Date    INR 1.03 10/12/2018    INR 1.10 02/14/2018     No results found for: BNP  Lab Results   Component Value Date    TROPONINI <0.01 01/25/2022    TROPONINI <0.01 12/09/2020     Lab Results   Component Value Date    TSH 0.76 10/19/2021         Medical History  Surgical History   Past Medical History:   Diagnosis Date     Anxiety      Asplenia      Cocaine use       Depression      Fever and chills      Hereditary spherocytosis (H)      Leukocytosis 7/18/2016     Pyelonephritis 11/15/2016     Sepsis, due to unspecified organism      SIRS (systemic inflammatory response syndrome) (H) 10/12/2019     Spherocytosis (H)      UTI (urinary tract infection)       Past Surgical History:   Procedure Laterality Date     SPLENECTOMY       ZZC LAP,CHOLECYSTECTOMY/EXPLORE      Description: Cholecystectomy Laparoscopic;  Recorded: 10/07/2009;     ZZC REMOVAL SPLEEN, TOTAL      Description: Splenectomy;  Recorded: 12/17/2012;  Comments: 11/12 secondary to hereditary spherocytosis. Recieved pneumovax, HIB and meningococcal vaccine prior to splenectomy. Needs pneumovax every 5 yrs for 2 doses and is on erythromycin prophylaxis. If gets fever >100.5, needs white count, differential, blood culture and strong consideration of rocephin 50-75 mg/kg. Should have CXR for any pul*     ZZC REMOVAL SPLEEN, TOTAL      Description: Splenectomy;  Recorded: 09/20/2014;  Comments: 11/2012 secondary to hereditary spherocytosis. Recieved pneumovax, HIB and meningococcal vaccine prior to splenectomy. Needs pneumovax every 5 yrs for 2 doses. If gets fever >100.5, needs white count, differential, blood culture and strong consideration of rocephin 50-75 mg/kg. Should have CXR for any pulmonary symptoms. All above recomm*

## 2022-02-01 NOTE — LETTER
2/1/2022    Michelle Medina MD  [address]  [facility] [address]    RE: Tonya TERRY Thayer       Dear Colleague,     I had the pleasure of seeing Tonya Thayer in the [facility].         [facility] [address], [place] [address]   [email or web address]   OFFICE: [phone]          Thank you Dr. Gaitan for asking the [facility] team to participate in the care of your patient, Tonya Thayer.     Impression and Plan     1.  Chest discomfort.  I agree with Dr. Terry Rosenbaum who had seen Tonya in the Emergency Department on 25 January 2022 that her symptoms are somewhat atypical for cardiac etiology, ischemic or otherwise.  Also as noted below, Tonya was seen later in the morning that same day on 25 January 2022 by primary provider, Dr. Michelle Medina.  She had reported not feeling her normal self ever since she had suffered a Covid infection.  Assessment by Dr. Medina lists generalized anxiety disorder as primary problem.  I agree with both Dr. Rosenbaum and Dr. Medina that some features do seem to have a component of anxiety.  Nonetheless, do feel would be prudent and worthwhile to pursue a bit further.  Specifically, recommend:    Plan to obtain echocardiogram to exclude any evidence of structural heart disease/evidence of reduced LV function post Covid infection.    Will obtain repeat one-patch monitor for a week to further define heart rhythm and rate and exclude any worrisome rhythm disturbances.    Follow-up and further recommendations pending test results.    35 minutes spent reviewing prior records (including documentation, laboratory studies, cardiac testing/imaging), interview with patient along with physical exam, planning, and subsequent documentation/crafting of note.       History of Present Illness    Once again I would like to thank you again for asking me to participate in the care of  "your patient, Tonya Thayer.  As you know, but to reiterate for my own records, Tonya Thayer is a 26 year old female who had presented to the emergency department 25 January 2022 with symptoms of chest pressure.  She was seen by Dr. Terry Rosenbaum.  She had been preparing to go to bed when she developed anterior \"chest pressure\".  Also felt nauseated and did go to the washroom and had emesis x1.  She had gone back to bed and stated as though she was feeling a \"panic attack\" and had taken one of her anxiolytics.  Chest discomfort and nausea, however, persisted and she presented to the emergency department.    She was seen later in the morning that same day on 25 January 2022 by primary provider, Dr. Michelle Medina.  She had reported not feeling her normal self ever since she had suffered a Covid infection.  Assessment by Dr. Medina list generalized anxiety disorder as primary problem.    On interview today, Tonya still has intermittent subjective episodes of inordinately rapid heart rate.  She continues to report some intermittent chest discomfort symptoms though not necessarily provoked with exertion.  She does indicate that when she was started on propranolol after her last evaluation and this did seem to ameliorate her subjective rapid heart rate somewhat though still is having intermittent bothersome episodes.  Denies shortness of breath at night to suggest PND/orthopnea.  She feels many of her symptoms are residual from after having contracted Covid.  Denies presently any fevers, chills, or other constitutional symptoms.    Further review of systems is otherwise negative/noncontributory (medical record and 13 point review of systems reviewed as well and pertinent positives noted).       Cardiac Diagnostics     Ambulatory monitor x10 days January 2021:  1. Sinus rhythm and sinus tachycardia, without significant atrial ventricular arrhythmias.  2. Symptomatic transmissions detailed above, " "correlated with sinus rhythm and sinus tachycardia.    Twelve-lead ECG (personally reviewed) 25 January 2022: Sinus rhythm.  Normal ECG.         Physical Examination       /70 (BP Location: Left arm, Patient Position: Sitting, Cuff Size: Adult Regular)   Pulse 88   Resp 16   Ht 1.6 m (5' 3\")   Wt 80.2 kg (176 lb 14.4 oz)   LMP 01/11/2022   BMI 31.34 kg/m          Wt Readings from Last 3 Encounters:   02/01/22 80.2 kg (176 lb 14.4 oz)   01/25/22 79.4 kg (175 lb)   01/25/22 81 kg (178 lb 9.2 oz)     The patient is alert and oriented times three. Sclerae are anicteric. Mucosal membranes are moist. Jugular venous pressure is normal. No significant adenopathy/thyromegally appreciated. Lungs are clear with good expansion. On cardiovascular exam, the patient has a regular S1 and S2. Abdomen is soft and non-tender. Extremities reveal no clubbing, cyanosis, or edema.       Imaging     Chest radiograph 25 January 2022:  1. No evidence of active cardiopulmonary disease.         Family History/Social History/Risk Factors   Patient does not smoke.  Family history reviewed, and family history includes Chronic Obstructive Pulmonary Disease in her mother; Clotting Disorder in her brother; Diabetes Type 2  in her father; Hereditary Spherocytosis in her brother and mother.        Medical History  Surgical History Family History Social History   Past Medical History:   Diagnosis Date     Anxiety      Asplenia      Cocaine use      Depression      Fever and chills      Hereditary spherocytosis (H)      Leukocytosis 7/18/2016     Pyelonephritis 11/15/2016     Sepsis, due to unspecified organism      SIRS (systemic inflammatory response syndrome) (H) 10/12/2019     Spherocytosis (H)      UTI (urinary tract infection)      Past Surgical History:   Procedure Laterality Date     SPLENECTOMY       ZZC LAP,CHOLECYSTECTOMY/EXPLORE      Description: Cholecystectomy Laparoscopic;  Recorded: 10/07/2009;     ZZC REMOVAL SPLEEN, TOTAL   "    Description: Splenectomy;  Recorded: 2012;  Comments:  secondary to hereditary spherocytosis. Recieved pneumovax, HIB and meningococcal vaccine prior to splenectomy. Needs pneumovax every 5 yrs for 2 doses and is on erythromycin prophylaxis. If gets fever >100.5, needs white count, differential, blood culture and strong consideration of rocephin 50-75 mg/kg. Should have CXR for any pul*     ZZC REMOVAL SPLEEN, TOTAL      Description: Splenectomy;  Recorded: 2014;  Comments: 2012 secondary to hereditary spherocytosis. Recieved pneumovax, HIB and meningococcal vaccine prior to splenectomy. Needs pneumovax every 5 yrs for 2 doses. If gets fever >100.5, needs white count, differential, blood culture and strong consideration of rocephin 50-75 mg/kg. Should have CXR for any pulmonary symptoms. All above recomm*     Family History   Problem Relation Age of Onset     Chronic Obstructive Pulmonary Disease Mother          age 50     Hereditary Spherocytosis Mother      Diabetes Type 2  Father      Clotting Disorder Brother         Hereditary Spherocytosis     Hereditary Spherocytosis Brother         Social History     Socioeconomic History     Marital status: Single     Spouse name: Not on file     Number of children: Not on file     Years of education: Not on file     Highest education level: Not on file   Occupational History     Not on file   Tobacco Use     Smoking status: Former Smoker     Years: 3.00     Quit date: 7/15/2020     Years since quittin.5     Smokeless tobacco: Never Used     Tobacco comment: 2 to 3 cigs a day.   Substance and Sexual Activity     Alcohol use: Not Currently     Comment: Alcoholic Drinks/day: 1 beer every few months      Drug use: Not Currently     Sexual activity: Not Currently     Partners: Male   Other Topics Concern     Not on file   Social History Narrative    Lives at home with mother and brother.     Social Determinants of Health     Financial Resource  Strain: Not on file   Food Insecurity: Not on file   Transportation Needs: Not on file   Physical Activity: Not on file   Stress: Not on file   Social Connections: Not on file   Intimate Partner Violence: Not on file   Housing Stability: Not on file           Medications  Allergies   Current Outpatient Medications   Medication Sig Dispense Refill     escitalopram (LEXAPRO) 20 MG tablet Take 1 tablet (20 mg) by mouth daily 90 tablet 1     hydrOXYzine (VISTARIL) 25 MG capsule Take 1 capsule (25 mg) by mouth nightly as needed for anxiety 60 capsule 3     LORazepam (ATIVAN) 1 MG tablet TAKE 1-2 TABLETS BY MOUTH TWICE DAILY AS NEEDED FOR ANXIETY 30 tablet 3     propranolol (INDERAL) 10 MG tablet Take 1 tablet (10 mg) by mouth 3 times daily as needed (palpitations) 60 tablet 1       Allergies   Allergen Reactions     Amoxicillin      Levofloxacin Shortness Of Breath     20 minutes after levofloxacin infusion patient felt short of breath and warm.  She had some obvious facial flushing     Prochlorperazine Anxiety, Other (See Comments), Palpitations and Shortness Of Breath     Buspirone Other (See Comments)     Troubles sleeping, restless feeling.      Penicillins      Tramadol Nausea and Vomiting          Lab Results    Chemistry/lipid CBC Cardiac Enzymes/BNP/TSH/INR   Recent Labs   Lab Test 09/13/17  1420   CHOL 222*   HDL 56   *   TRIG 57     Recent Labs   Lab Test 09/13/17  1420   *     Recent Labs   Lab Test 01/25/22  0110      POTASSIUM 4.1   CHLORIDE 105   CO2 25   GLC 85   BUN 10   CR 0.58*   GFRESTIMATED >90   NOMI 9.1     Recent Labs   Lab Test 01/25/22  0110 10/23/21  1950 10/19/21  1535   CR 0.58* 0.75 0.60     No results for input(s): A1C in the last 97060 hours.       Recent Labs   Lab Test 01/25/22  0110   WBC 13.0*   HGB 12.8   HCT 37.0   MCV 88   *     Recent Labs   Lab Test 01/25/22  0110 10/23/21  1950 10/19/21  1535   HGB 12.8 12.4 12.9    Recent Labs   Lab Test 01/25/22 0110  12/09/20  0426   TROPONINI <0.01 <0.01     No results for input(s): BNP, NTBNPI, NTBNP in the last 36859 hours.  Recent Labs   Lab Test 10/19/21  1535   TSH 0.76     Recent Labs   Lab Test 10/12/18  0621 10/11/18  1745 10/03/18  1305   INR 1.03 0.94 0.96          Medications  Allergies   Current Outpatient Medications   Medication Sig Dispense Refill     escitalopram (LEXAPRO) 20 MG tablet Take 1 tablet (20 mg) by mouth daily 90 tablet 1     hydrOXYzine (VISTARIL) 25 MG capsule Take 1 capsule (25 mg) by mouth nightly as needed for anxiety 60 capsule 3     LORazepam (ATIVAN) 1 MG tablet TAKE 1-2 TABLETS BY MOUTH TWICE DAILY AS NEEDED FOR ANXIETY 30 tablet 3     propranolol (INDERAL) 10 MG tablet Take 1 tablet (10 mg) by mouth 3 times daily as needed (palpitations) 60 tablet 1      Allergies   Allergen Reactions     Amoxicillin      Levofloxacin Shortness Of Breath     20 minutes after levofloxacin infusion patient felt short of breath and warm.  She had some obvious facial flushing     Prochlorperazine Anxiety, Other (See Comments), Palpitations and Shortness Of Breath     Buspirone Other (See Comments)     Troubles sleeping, restless feeling.      Penicillins      Tramadol Nausea and Vomiting        Lab Results   Lab Results   Component Value Date     01/25/2022     02/17/2018    CO2 25 01/25/2022    CO2 24 02/17/2018    BUN 10 01/25/2022    BUN 5 02/17/2018     Lab Results   Component Value Date    WBC 13.0 01/25/2022    WBC 5.5 02/16/2018    HGB 12.8 01/25/2022    HGB 11.6 02/16/2018    HCT 37.0 01/25/2022    HCT 33.0 02/16/2018    MCV 88 01/25/2022    MCV 88 02/16/2018     01/25/2022     02/16/2018     Lab Results   Component Value Date    CHOL 222 09/13/2017    TRIG 57 09/13/2017    HDL 56 09/13/2017     Lab Results   Component Value Date    INR 1.03 10/12/2018    INR 1.10 02/14/2018     No results found for: BNP  Lab Results   Component Value Date    TROPONINI <0.01 01/25/2022     TROPONINI <0.01 12/09/2020     Lab Results   Component Value Date    TSH 0.76 10/19/2021         Medical History  Surgical History   Past Medical History:   Diagnosis Date     Anxiety      Asplenia      Cocaine use      Depression      Fever and chills      Hereditary spherocytosis (H)      Leukocytosis 7/18/2016     Pyelonephritis 11/15/2016     Sepsis, due to unspecified organism      SIRS (systemic inflammatory response syndrome) (H) 10/12/2019     Spherocytosis (H)      UTI (urinary tract infection)       Past Surgical History:   Procedure Laterality Date     SPLENECTOMY       ZZC LAP,CHOLECYSTECTOMY/EXPLORE      Description: Cholecystectomy Laparoscopic;  Recorded: 10/07/2009;     ZZC REMOVAL SPLEEN, TOTAL      Description: Splenectomy;  Recorded: 12/17/2012;  Comments: 11/12 secondary to hereditary spherocytosis. Recieved pneumovax, HIB and meningococcal vaccine prior to splenectomy. Needs pneumovax every 5 yrs for 2 doses and is on erythromycin prophylaxis. If gets fever >100.5, needs white count, differential, blood culture and strong consideration of rocephin 50-75 mg/kg. Should have CXR for any pul*     ZZC REMOVAL SPLEEN, TOTAL      Description: Splenectomy;  Recorded: 09/20/2014;  Comments: 11/2012 secondary to hereditary spherocytosis. Recieved pneumovax, HIB and meningococcal vaccine prior to splenectomy. Needs pneumovax every 5 yrs for 2 doses. If gets fever >100.5, needs white count, differential, blood culture and strong consideration of rocephin 50-75 mg/kg. Should have CXR for any pulmonary symptoms. All above recomm*             Thank you for allowing me to participate in the care of your patient.      Sincerely,     Luis Rahman MD     [facility]  cc:   Luigi Gaitan MD  [address] [place] [address]

## 2022-02-04 NOTE — TELEPHONE ENCOUNTER
Tried to call again without success.     2/11 at 3pm. Ok per Dr key unless someone else takes it before pt replies.

## 2022-02-11 ENCOUNTER — HOSPITAL ENCOUNTER (OUTPATIENT)
Dept: CARDIOLOGY | Facility: CLINIC | Age: 27
End: 2022-02-11
Attending: INTERNAL MEDICINE
Payer: COMMERCIAL

## 2022-02-11 DIAGNOSIS — R07.2 PRECORDIAL PAIN: ICD-10-CM

## 2022-02-11 DIAGNOSIS — R00.2 PALPITATIONS: ICD-10-CM

## 2022-02-11 DIAGNOSIS — R07.9 INTERMITTENT CHEST PAIN: ICD-10-CM

## 2022-02-11 LAB — LVEF ECHO: NORMAL

## 2022-02-11 PROCEDURE — 93306 TTE W/DOPPLER COMPLETE: CPT | Mod: 26 | Performed by: INTERNAL MEDICINE

## 2022-02-11 PROCEDURE — 93270 REMOTE 30 DAY ECG REV/REPORT: CPT

## 2022-02-11 PROCEDURE — 93306 TTE W/DOPPLER COMPLETE: CPT

## 2022-02-16 ENCOUNTER — MYC MEDICAL ADVICE (OUTPATIENT)
Dept: CARDIOLOGY | Facility: CLINIC | Age: 27
End: 2022-02-16
Payer: COMMERCIAL

## 2022-02-16 NOTE — TELEPHONE ENCOUNTER
Msg rec'd 2-16-22 @ 1411:  Luis Rahman MD Fleischhacker, Kelly, RN Hi Kelly,   Please see note from patient below. Her work-up has been unremarkable from a cardiac standpoint. I have a hard time connecting patient's symptoms with any cardiac etiology. Would recommend his next follow-up with primary provider to consider noncardiac causes. Thanks.

## 2022-02-21 PROCEDURE — 93272 ECG/REVIEW INTERPRET ONLY: CPT | Performed by: INTERNAL MEDICINE

## 2022-02-23 ENCOUNTER — OFFICE VISIT (OUTPATIENT)
Dept: FAMILY MEDICINE | Facility: CLINIC | Age: 27
End: 2022-02-23
Payer: COMMERCIAL

## 2022-02-23 VITALS
SYSTOLIC BLOOD PRESSURE: 110 MMHG | TEMPERATURE: 98.7 F | OXYGEN SATURATION: 99 % | BODY MASS INDEX: 30.7 KG/M2 | WEIGHT: 173.3 LBS | HEART RATE: 79 BPM | DIASTOLIC BLOOD PRESSURE: 80 MMHG

## 2022-02-23 DIAGNOSIS — H93.8X3 EAR PRESSURE, BILATERAL: Primary | ICD-10-CM

## 2022-02-23 PROCEDURE — 99213 OFFICE O/P EST LOW 20 MIN: CPT | Performed by: STUDENT IN AN ORGANIZED HEALTH CARE EDUCATION/TRAINING PROGRAM

## 2022-02-23 NOTE — PROGRESS NOTES
Assessment and Plan     26-year-old female with concerns of ear pressure over the last couple of days.  No obvious etiology on exam.  No other concerning symptoms.  I recommended some behavioral techniques for eustachian tube dysfunction including gum chewing, salt water gargling, yawning as well as trying a decongestant such as Sudafed.  If these do not improve symptoms over the next week or 2 I recommended she come into the clinic for follow-up.    1. Ear pressure, bilateral    Follow up: PRN  Options for treatment and follow-up care were reviewed with the patient and/or guardian. Tonya Thayer and/or guardian engaged in the decision making process and verbalized understanding of the options discussed and agreed with the final plan.    Dr. Jeffrey Zamora         HPI:   Tonya Thayer is a 26 year old  female who presents for:    Chief Complaint   Patient presents with     Otitis Media     bilateral ear pain for a few days.      Both ear for a couple of days she has christel having a pressure and pain. No decreased hearing. Occasional tinnitus. No discharge from the ears. No flu like symptoms. No balance issues.  No changes in ear pressure with eating.         PMHX:     Patient Active Problem List   Diagnosis     Tachycardia     Allergic rhinitis     Asplenia     Hereditary spherocytosis (H)     Nonintractable migraine     Moderate major depression (H)     Panic disorder     Thrombocytosis       Current Outpatient Medications   Medication Sig Dispense Refill     escitalopram (LEXAPRO) 20 MG tablet Take 1 tablet (20 mg) by mouth daily 90 tablet 1     LORazepam (ATIVAN) 1 MG tablet TAKE 1-2 TABLETS BY MOUTH TWICE DAILY AS NEEDED FOR ANXIETY 30 tablet 3     propranolol (INDERAL) 10 MG tablet Take 1 tablet (10 mg) by mouth 3 times daily as needed (palpitations) 60 tablet 1     hydrOXYzine (VISTARIL) 25 MG capsule Take 1 capsule (25 mg) by mouth nightly as needed for anxiety (Patient not taking: Reported on  2022) 60 capsule 3       Social History     Tobacco Use     Smoking status: Former Smoker     Years: 3.00     Quit date: 7/15/2020     Years since quittin.6     Smokeless tobacco: Never Used     Tobacco comment: 2 to 3 cigs a day.   Substance Use Topics     Alcohol use: Not Currently     Comment: Alcoholic Drinks/day: 1 beer every few months      Drug use: Not Currently       Social History     Social History Narrative    Lives at home with mother and brother.       Allergies   Allergen Reactions     Amoxicillin      Levofloxacin Shortness Of Breath     20 minutes after levofloxacin infusion patient felt short of breath and warm.  She had some obvious facial flushing     Prochlorperazine Anxiety, Other (See Comments), Palpitations and Shortness Of Breath     Buspirone Other (See Comments)     Troubles sleeping, restless feeling.      Penicillins      Tramadol Nausea and Vomiting       No results found for this or any previous visit (from the past 24 hour(s)).         Review of Systems:    ROS: 10 point ROS neg other than the symptoms noted above in the HPI.         Physical Exam:     Vitals:    22 1557   BP: 110/80   BP Location: Right arm   Patient Position: Sitting   Cuff Size: Adult Regular   Pulse: 79   Temp: 98.7  F (37.1  C)   TempSrc: Temporal   SpO2: 99%   Weight: 78.6 kg (173 lb 4.8 oz)     Body mass index is 30.7 kg/m .    General appearance: Alert, cooperative, no distress, appears stated age  Head: Normocephalic, atraumatic, without obvious abnormality  Eyes: Pupils equal round, reactive.  Conjunctiva clear.  Ears:  No cerumen, TM's grey dull with structures seen bilaterally  Nose: Nares normal, no drainage.  Throat: Lips, mucosa, tongue normal mucosa pink and moist  Neck: Supple, symmetric, trachea midline, no adenopathy.  No thyroid enlargement, tenderness or nodules.

## 2022-02-23 NOTE — PATIENT INSTRUCTIONS
Patient Education     Earache, No Infection (Adult)   Earaches can happen without an infection. They can occur when air and fluid build up behind the eardrum. They may cause a feeling of fullness and discomfort. They may also impair hearing. This is called otitis media with effusion (OME) or serous otitis media. It means there is fluid in the middle ear. It is not the same as acute otitis media, which is often from an infection.  OME can happen when you have a cold if congestion blocks the passage that drains the middle ear. This passage is called the eustachian tube. OME may also occur with nasal allergies or after a bacterial infection in the middle ear. Other causes are:    Trauma    Improper cleaning of wax from the ear    Bacterial infection of the mastoid bone (mastoiditis)    Tumor    Jaw pain    Changes in pressure, such as from flying or scuba diving    The pain or discomfort may come and go. You may hear clicking or popping sounds when you chew or swallow. You may feel that your balance is off. Or you may hear ringing in the ear.  It often takes from several weeks up to 3 months for the fluid to clear on its own. Oral pain relievers and ear drops help if there is pain. Decongestants and antihistamines sometimes help. Antibiotics don't help since there is no infection. Your healthcare provider may give you a nasal spray to help reduce swelling in the nose and eustachian tube. This can allow the ear to drain.  If your OME doesn't get better after 3 months, surgery may be used to drain the fluid. A small tube may also be put in the eardrum to help with drainage.  Because the middle ear fluid can become infected, watch for signs of an infection. These may develop later. They may include increased ear pain, fever, or drainage from the ear.  Home care  These home-care tips will help you take care of yourself:    You may use over-the-counter medicine as directed by your healthcare provider to control pain, unless  medicine was prescribed. If you have chronic liver or kidney disease or ever had a stomach ulcer or GI bleeding, talk with your healthcare provider before using any medicines.    Aspirin should never be used in anyone younger than age 18 who has a fever. It may cause severe liver damage.    Ask your healthcare provider if you may use over-the-counter decongestants such as phenylephrine or pseudoephedrine. Keep in mind they are not always helpful.    Talk with your healthcare provider about using nasal spray decongestants. Don't use them for more than 3 days, or as directed by your healthcare provider. Longer use can make congestion worse. Prescription nasal sprays from your healthcare provider don't often have such restrictions.    Antihistamines may help if you are also having allergy symptoms.    You may use medicines such as guaifenesin to thin mucus and help with drainage.  Follow-up care  Follow up with your healthcare provider or as advised if you are not feeling better after 3 days.  When to seek medical advice  Call your healthcare provider right away if any of these occur:    Ear pain that gets worse or that does not start to get better     Fever of 100.4 F (38 C) or higher, or as directed by your healthcare provider    Fluid or blood draining from the ear    Headache or sinus pain    Stiff neck    Unusual drowsiness or confusion  [employer] last reviewed this educational content on 12/1/2019 2000-2021 [employer]. All rights reserved. This information is not intended as a substitute for professional medical care. Always follow your healthcare professional's instructions.

## 2022-03-20 ENCOUNTER — MYC MEDICAL ADVICE (OUTPATIENT)
Dept: FAMILY MEDICINE | Facility: CLINIC | Age: 27
End: 2022-03-20
Payer: COMMERCIAL

## 2022-03-20 DIAGNOSIS — R11.0 NAUSEA: Primary | ICD-10-CM

## 2022-03-20 NOTE — LETTER
March 23, 2022      Tonya Thayer  [address]  [facility] [address]        To Whom It May Concern:    Tonya Thayer  was seen on 3/21/22.  Please excuse her 3/21/22 through 3/23/22 due to illness.        Sincerely,        Michelle Medina MD

## 2022-03-21 NOTE — TELEPHONE ENCOUNTER
Medication: Zofran 4mg ODT  Last Date Filled: 1/25/22 #10  Last appointment addressing medication: 1/25/22  Last B/P:  BP Readings from Last 3 Encounters:   02/23/22 110/80   02/01/22 104/70   01/25/22 110/70     Last labs pertaining to refill:

## 2022-03-22 ENCOUNTER — TELEPHONE (OUTPATIENT)
Dept: FAMILY MEDICINE | Facility: CLINIC | Age: 27
End: 2022-03-22
Payer: COMMERCIAL

## 2022-03-22 RX ORDER — ONDANSETRON 4 MG/1
4 TABLET, ORALLY DISINTEGRATING ORAL EVERY 8 HOURS PRN
Qty: 30 TABLET | Refills: 3 | Status: SHIPPED | OUTPATIENT
Start: 2022-03-22 | End: 2023-07-31

## 2022-03-22 NOTE — TELEPHONE ENCOUNTER
Pt is also requesting a note/letter for work - Pt was seen in ER at [facility] Yesterday and the provider on the floor was unable to write one for her at the time. Please advise. Pt is also looking for medication refill.

## 2022-03-22 NOTE — TELEPHONE ENCOUNTER
Per previous encounter today regarding a refill and a note, patient called back to ask the rx be called in to [facility] on [address].

## 2022-03-25 ENCOUNTER — OFFICE VISIT (OUTPATIENT)
Dept: FAMILY MEDICINE | Facility: CLINIC | Age: 27
End: 2022-03-25
Payer: COMMERCIAL

## 2022-03-25 VITALS
WEIGHT: 170.4 LBS | HEART RATE: 100 BPM | SYSTOLIC BLOOD PRESSURE: 120 MMHG | OXYGEN SATURATION: 98 % | DIASTOLIC BLOOD PRESSURE: 76 MMHG | BODY MASS INDEX: 30.19 KG/M2

## 2022-03-25 DIAGNOSIS — K29.00 ACUTE GASTRITIS WITHOUT HEMORRHAGE, UNSPECIFIED GASTRITIS TYPE: Primary | ICD-10-CM

## 2022-03-25 PROCEDURE — 99214 OFFICE O/P EST MOD 30 MIN: CPT | Performed by: NURSE PRACTITIONER

## 2022-03-25 NOTE — LETTER
[facility]  [address] [facility], [address]  [facility] [address]  Phone: [phone]  Fax: [phone]                   To whom it may concern,          Please excuse Tonya Thayer for days missed from 3/21/22 through 3/28/22 unless she's feeling better due to illness.  If you have any questions, I am typically in the office Tuesdays-Fridays 7AM-4PM.            Petors Winchester, CNP      
normal (ped)...

## 2022-03-25 NOTE — PROGRESS NOTES
Assessment & Plan     ICD-10-CM    1. Acute gastritis without hemorrhage, unspecified gastritis type  K29.00 omeprazole (PRILOSEC) 20 MG DR capsule   2. ZION (generalized anxiety disorder)  F41.1        Reviewed office visit note from emergency department x2, CBC x2, COVID 19 test x1, pregnancy urine x1, basic metabolic panel x2.    Still struggling.  Getting persistent reflux symptoms with this nausea.  Reviewed bland diet.  Patient cannot tolerate Compazine and is hesitant to try Reglan given history with Compazine.  This is reasonable.  Add on omeprazole.  See if that can help reduce some of the symptoms within a few days.  No signs or symptoms today of profound dehydration.  Work note provided.    Subjective     HPI     Nausea and vomitting.    In the er on 3/21 and 3/23.  Symptoms started about 6 days ago.  No known sick contacts. Last vomit was yesterday.  Hasnt eaten today.   Had blood work and fluids both times.  Also got zofran. Zofran only helps for a small amount of time.  Emesis is acid and stomach contents. No blood or coffee ground.  Started this with diarrhea. Hasn't had a BM for a little while but also hasn't eaten for a while.   Getting some reflux sour taste in the mouth. Positive intermittent chills without fever.   No abdominal pains.   No recent antibiotic use.  Trying to push fluids and is making urine.  Color is light yellow.   No new back or flank pain.        Review of Systems - negative except for what's listed in the HPI      Objective    /76   Pulse 100   Wt 77.3 kg (170 lb 6.4 oz)   LMP 03/11/2022 (Approximate)   SpO2 98%   Breastfeeding No   BMI 30.19 kg/m    Physical Exam   General appearance - alert, tired appearing, and in no distress  Mental status - alert, oriented to person, place, and time  Eyes -PERRLA, sclera white, conjunctiva pink  Mouth - mucous membranes moist. No oral lesions.  Neck - no significant adenopathy  Lymphatics - no palpable lymphadenopathy  Chest -  clear to auscultation, no wheezes, rales or rhonchi, symmetric air entry  Heart - normal rate and regular rhythm, S1 and S2 normal, no murmurs noted  Abdomen - soft, nontender, nondistended, no masses or organomegaly  Neurological - alert, oriented, normal speech, no focal findings or movement disorder noted, neck supple without rigidity, cranial nerves II through XII intact, motor and sensory grossly normal bilaterally, normal muscle tone, no tremors, strength 5/5  Extremities - peripheral pulses normal, no peripheral edema  Skin - normal coloration and turgor.    Petros Winchester, CNP    This note has been dictated using voice recognition software. Any grammatical or context distortions are unintentional and inherent to the software.

## 2022-03-25 NOTE — PATIENT INSTRUCTIONS
Keep focusing on a bland diet avoiding spicy, caffeinated, dairy, greasy/fatty foods.    I sent that omeprazole medication to start bringing down the acid levels in your stomach.  Remember this may take 2 or 3 days to start kicking in.    Work note provided today.

## 2022-04-04 ENCOUNTER — MYC MEDICAL ADVICE (OUTPATIENT)
Dept: FAMILY MEDICINE | Facility: CLINIC | Age: 27
End: 2022-04-04
Payer: COMMERCIAL

## 2022-04-04 DIAGNOSIS — R00.2 PALPITATIONS: ICD-10-CM

## 2022-04-04 RX ORDER — PROPRANOLOL HYDROCHLORIDE 10 MG/1
10 TABLET ORAL 3 TIMES DAILY PRN
Qty: 60 TABLET | Refills: 1 | Status: SHIPPED | OUTPATIENT
Start: 2022-04-04 | End: 2022-08-30 | Stop reason: SINTOL

## 2022-04-04 NOTE — TELEPHONE ENCOUNTER
Medication: propanolol   Last Date Filled: 1/25/22 #60 RF 2  Last appointment addressing medication: 3/25/2022  Last B/P:  BP Readings from Last 3 Encounters:   03/25/22 120/76   02/23/22 110/80   02/01/22 104/70

## 2022-04-05 RX ORDER — PROPRANOLOL HYDROCHLORIDE 10 MG/1
10 TABLET ORAL 3 TIMES DAILY PRN
Qty: 60 TABLET | Refills: 0 | OUTPATIENT
Start: 2022-04-05

## 2022-04-05 NOTE — TELEPHONE ENCOUNTER
Duplicate refill request.  Propranolol 10 mg filled 4/4/2022 #60 tablets with 1 refill to [facility].    propranolol (INDERAL) 10 MG tablet 60 tablet 1 4/4/2022  No   Sig - Route: Take 1 tablet (10 mg) by mouth 3 times daily as needed (palpitations) - Oral   Sent to pharmacy as: Propranolol HCl 10 MG Oral Tablet (INDERAL)   Class: E-Prescribe   Order: [id number]   E-Prescribing Status: Receipt confirmed by pharmacy (4/4/2022  3:45 PM CDT       Lakia Edward, RN  Triage Nurse Advisor

## 2022-05-02 ENCOUNTER — TRANSFERRED RECORDS (OUTPATIENT)
Dept: HEALTH INFORMATION MANAGEMENT | Facility: CLINIC | Age: 27
End: 2022-05-02
Payer: COMMERCIAL

## 2022-05-03 ENCOUNTER — TRANSFERRED RECORDS (OUTPATIENT)
Dept: HEALTH INFORMATION MANAGEMENT | Facility: CLINIC | Age: 27
End: 2022-05-03
Payer: COMMERCIAL

## 2022-05-22 ENCOUNTER — HOSPITAL ENCOUNTER (EMERGENCY)
Facility: CLINIC | Age: 27
Discharge: HOME | End: 2022-05-22
Attending: EMERGENCY MEDICINE | Admitting: EMERGENCY MEDICINE
Payer: COMMERCIAL

## 2022-05-22 ENCOUNTER — APPOINTMENT (OUTPATIENT)
Dept: ULTRASOUND IMAGING | Facility: CLINIC | Age: 27
End: 2022-05-22
Attending: EMERGENCY MEDICINE
Payer: COMMERCIAL

## 2022-05-22 VITALS
SYSTOLIC BLOOD PRESSURE: 135 MMHG | WEIGHT: 170 LBS | RESPIRATION RATE: 18 BRPM | TEMPERATURE: 98.2 F | BODY MASS INDEX: 30.12 KG/M2 | HEIGHT: 63 IN | HEART RATE: 74 BPM | OXYGEN SATURATION: 100 % | DIASTOLIC BLOOD PRESSURE: 67 MMHG

## 2022-05-22 DIAGNOSIS — M54.50 ACUTE BILATERAL LOW BACK PAIN WITHOUT SCIATICA: ICD-10-CM

## 2022-05-22 LAB
ALBUMIN UR-MCNC: NEGATIVE MG/DL
ANION GAP SERPL CALCULATED.3IONS-SCNC: 9 MMOL/L (ref 5–18)
APPEARANCE UR: CLEAR
BACTERIA #/AREA URNS HPF: ABNORMAL /HPF
BILIRUB UR QL STRIP: NEGATIVE
BUN SERPL-MCNC: 7 MG/DL (ref 8–22)
CALCIUM SERPL-MCNC: 9 MG/DL (ref 8.5–10.5)
CHLORIDE BLD-SCNC: 102 MMOL/L (ref 98–107)
CO2 SERPL-SCNC: 26 MMOL/L (ref 22–31)
COLOR UR AUTO: ABNORMAL
CREAT SERPL-MCNC: 0.58 MG/DL (ref 0.6–1.1)
ERYTHROCYTE [DISTWIDTH] IN BLOOD BY AUTOMATED COUNT: 13.2 % (ref 10–15)
GFR SERPL CREATININE-BSD FRML MDRD: >90 ML/MIN/1.73M2
GLUCOSE BLD-MCNC: 75 MG/DL (ref 70–125)
GLUCOSE UR STRIP-MCNC: NEGATIVE MG/DL
HCG SERPL QL: POSITIVE
HCT VFR BLD AUTO: 35.4 % (ref 35–47)
HGB BLD-MCNC: 12.7 G/DL (ref 11.7–15.7)
HGB UR QL STRIP: NEGATIVE
KETONES UR STRIP-MCNC: 60 MG/DL
LEUKOCYTE ESTERASE UR QL STRIP: NEGATIVE
MCH RBC QN AUTO: 31.1 PG (ref 26.5–33)
MCHC RBC AUTO-ENTMCNC: 35.9 G/DL (ref 31.5–36.5)
MCV RBC AUTO: 87 FL (ref 78–100)
MUCOUS THREADS #/AREA URNS LPF: PRESENT /LPF
NITRATE UR QL: NEGATIVE
PH UR STRIP: 5.5 [PH] (ref 5–7)
PLATELET # BLD AUTO: 626 10E3/UL (ref 150–450)
POTASSIUM BLD-SCNC: 3.4 MMOL/L (ref 3.5–5)
RBC # BLD AUTO: 4.09 10E6/UL (ref 3.8–5.2)
RBC URINE: <1 /HPF
SODIUM SERPL-SCNC: 137 MMOL/L (ref 136–145)
SP GR UR STRIP: 1.01 (ref 1–1.03)
SQUAMOUS EPITHELIAL: 1 /HPF
UROBILINOGEN UR STRIP-MCNC: <2 MG/DL
WBC # BLD AUTO: 15.1 10E3/UL (ref 4–11)
WBC URINE: <1 /HPF

## 2022-05-22 PROCEDURE — 85027 COMPLETE CBC AUTOMATED: CPT | Performed by: EMERGENCY MEDICINE

## 2022-05-22 PROCEDURE — 96374 THER/PROPH/DIAG INJ IV PUSH: CPT

## 2022-05-22 PROCEDURE — 80048 BASIC METABOLIC PNL TOTAL CA: CPT | Performed by: EMERGENCY MEDICINE

## 2022-05-22 PROCEDURE — 250N000011 HC RX IP 250 OP 636: Performed by: EMERGENCY MEDICINE

## 2022-05-22 PROCEDURE — 36415 COLL VENOUS BLD VENIPUNCTURE: CPT | Performed by: EMERGENCY MEDICINE

## 2022-05-22 PROCEDURE — 84703 CHORIONIC GONADOTROPIN ASSAY: CPT | Performed by: EMERGENCY MEDICINE

## 2022-05-22 PROCEDURE — 99285 EMERGENCY DEPT VISIT HI MDM: CPT | Mod: 25

## 2022-05-22 PROCEDURE — 76801 OB US < 14 WKS SINGLE FETUS: CPT

## 2022-05-22 PROCEDURE — 81001 URINALYSIS AUTO W/SCOPE: CPT | Performed by: EMERGENCY MEDICINE

## 2022-05-22 PROCEDURE — 96376 TX/PRO/DX INJ SAME DRUG ADON: CPT

## 2022-05-22 PROCEDURE — 76770 US EXAM ABDO BACK WALL COMP: CPT

## 2022-05-22 RX ORDER — HYDROMORPHONE HYDROCHLORIDE 1 MG/ML
0.5 INJECTION, SOLUTION INTRAMUSCULAR; INTRAVENOUS; SUBCUTANEOUS ONCE
Status: COMPLETED | OUTPATIENT
Start: 2022-05-22 | End: 2022-05-22

## 2022-05-22 RX ORDER — ONDANSETRON 4 MG/1
4 TABLET, ORALLY DISINTEGRATING ORAL
Status: COMPLETED | OUTPATIENT
Start: 2022-05-22 | End: 2022-05-22

## 2022-05-22 RX ADMIN — HYDROMORPHONE HYDROCHLORIDE 0.5 MG: 1 INJECTION, SOLUTION INTRAMUSCULAR; INTRAVENOUS; SUBCUTANEOUS at 18:05

## 2022-05-22 RX ADMIN — ONDANSETRON 4 MG: 4 TABLET, ORALLY DISINTEGRATING ORAL at 16:38

## 2022-05-22 RX ADMIN — HYDROMORPHONE HYDROCHLORIDE 0.5 MG: 1 INJECTION, SOLUTION INTRAMUSCULAR; INTRAVENOUS; SUBCUTANEOUS at 16:36

## 2022-05-22 ASSESSMENT — ENCOUNTER SYMPTOMS
BACK PAIN: 1
FREQUENCY: 1
NAUSEA: 1
DYSURIA: 0
FEVER: 0
VOMITING: 1
CHILLS: 0

## 2022-05-22 NOTE — ED TRIAGE NOTES
Patient presents to the ED with complaints of low back pain that just started this afternoon. She also reports she is approximately 7 weeks pregnant and is having some vaginal discharge and abdominal cramping, denies bleeding.

## 2022-05-22 NOTE — ED PROVIDER NOTES
EMERGENCY DEPARTMENT ENCOUNTER      NAME: Tonya Thayer  AGE: 26 year old female  YOB: 1995  MRN: [id number]  EVALUATION DATE & TIME: 5/22/2022  3:32 PM    PCP: Michelle Medina    ED PROVIDER: Andrea Aragon M.D.      Chief Complaint   Patient presents with     Back Pain         FINAL IMPRESSION:  1. Acute bilateral low back pain without sciatica          ED COURSE & MEDICAL DECISION MAKING:    Pertinent Labs & Imaging studies reviewed. (See chart for details)  ED Course as of 05/22/22 1923   Sun May 22, 2022   1623 Patient is a 26-year-old woman who is pregnancy by home test, about 7 weeks, here with severe low back pain that began a few hours ago.  She is uncomfortable, is afebrile.  Has increased urinary frequency.  Differential includes ectopic pregnancy, pyonephritis, kidney stone, UTI.  IV analgesia ordered, ultrasound of uterus, kidneys ordered.   1643 WBC(!): 15.1   1754 No hydro seen on renal ultrasound   1801 OB  US 1st trimester w transvag  1.  Single living intrauterine gestation at 5 weeks 6 days, EDC 1/16/2023.  2.  No abnormalities.   1802 UA not consistent with UTI   1802 Elevated white blood cell count 15, platelets are elevated at 626.  He is afebrile, no obvious source of infection at this time.   1919 On review patient's leukocytosis and thrombocytosis is chronic.  Do not think this represents an acute bacterial or inflammatory process.  Symptoms could be musculoskeletal.  Discharged with Tylenol, primary care follow-up.       Additional ED Course Timestamps:  4:15 PM I met with the patient, obtained history, performed an initial exam, and discussed options and plan for diagnostics and treatment here in the ED.   7:15 PM I spoke with OB. I rechecked and updated the patient on test results and plan for discharge, which she is in agreement with. Reviewed supportive cares, symptomatic treatment, outpatient follow up, and reasons to return to the Emergency Department.      At the conclusion of the encounter I discussed the results of all of the tests and the disposition. The questions were answered. The patient or family acknowledged understanding and was agreeable with the care plan.       MEDICATIONS GIVEN IN THE EMERGENCY:  Medications   HYDROmorphone (PF) (DILAUDID) injection 0.5 mg (0.5 mg Intravenous Given 5/22/22 1636)   ondansetron (ZOFRAN ODT) ODT tab 4 mg (4 mg Oral Given 5/22/22 1638)   HYDROmorphone (PF) (DILAUDID) injection 0.5 mg (0.5 mg Intravenous Given 5/22/22 1805)         NEW PRESCRIPTIONS STARTED AT TODAY'S ER VISIT  New Prescriptions    No medications on file          =================================================================    HPI    Patient information was obtained from: patient    Use of : N/A         Tonya MARA Thayer is a 26 year old female currently 5w6d pregnant with a pertinent history of pyelonephritis, UTI, cholecystectomy, splenectomy, and hereditary spherocytosis, who presents to this ED via private vehicle for evaluation of back pain.     Patient endorses acute onset constant diffuse lower back pain that began around 1300 (~30 minutes PTA) while at work. She currently rates her pain 7/10 and describes it as both burning and dull with exacerbating factors including movement. Patient has taken Tylenol and Ibuprofen without significant relief. She also endorses one episode of emesis secondary to pain as well as urinary frequency. Of note, patient reports several recent positive home pregnancy tests. She had an initial OB appointment scheduled on 5/20, however this was cancelled and she has not yet rescheduled. She endorses associated mild nausea, otherwise denies any other pregnancy related symptoms. Otherwise denies fever, chills, dysuria, or any other symptoms or concerns at this time. No history of nephrolithiasis.            REVIEW OF SYSTEMS   Review of Systems   Constitutional: Negative for chills and fever.    Gastrointestinal: Positive for nausea and vomiting.   Genitourinary: Positive for frequency. Negative for dysuria.   Musculoskeletal: Positive for back pain (diffuse lower).   All other systems reviewed and are negative.       PAST MEDICAL HISTORY:  Past Medical History:   Diagnosis Date     Anxiety      Asplenia      Cocaine use      Depression      Fever and chills      Hereditary spherocytosis (H)      Leukocytosis 7/18/2016     Pyelonephritis 11/15/2016     Sepsis, due to unspecified organism      SIRS (systemic inflammatory response syndrome) (H) 10/12/2019     Spherocytosis (H)      UTI (urinary tract infection)        PAST SURGICAL HISTORY:  Past Surgical History:   Procedure Laterality Date     SPLENECTOMY       ZZC LAP,CHOLECYSTECTOMY/EXPLORE      Description: Cholecystectomy Laparoscopic;  Recorded: 10/07/2009;     ZZC REMOVAL SPLEEN, TOTAL      Description: Splenectomy;  Recorded: 12/17/2012;  Comments: 11/12 secondary to hereditary spherocytosis. Recieved pneumovax, HIB and meningococcal vaccine prior to splenectomy. Needs pneumovax every 5 yrs for 2 doses and is on erythromycin prophylaxis. If gets fever >100.5, needs white count, differential, blood culture and strong consideration of rocephin 50-75 mg/kg. Should have CXR for any pul*     ZZC REMOVAL SPLEEN, TOTAL      Description: Splenectomy;  Recorded: 09/20/2014;  Comments: 11/2012 secondary to hereditary spherocytosis. Recieved pneumovax, HIB and meningococcal vaccine prior to splenectomy. Needs pneumovax every 5 yrs for 2 doses. If gets fever >100.5, needs white count, differential, blood culture and strong consideration of rocephin 50-75 mg/kg. Should have CXR for any pulmonary symptoms. All above recomm*           CURRENT MEDICATIONS:    No current facility-administered medications for this encounter.     Current Outpatient Medications   Medication     escitalopram (LEXAPRO) 20 MG tablet     hydrOXYzine (VISTARIL) 25 MG capsule     LORazepam  "(ATIVAN) 1 MG tablet     omeprazole (PRILOSEC) 20 MG DR capsule     ondansetron (ZOFRAN-ODT) 4 MG ODT tab     propranolol (INDERAL) 10 MG tablet       ALLERGIES:  Allergies   Allergen Reactions     Amoxicillin      Levofloxacin Shortness Of Breath     20 minutes after levofloxacin infusion patient felt short of breath and warm.  She had some obvious facial flushing     Prochlorperazine Anxiety, Other (See Comments), Palpitations and Shortness Of Breath     Buspirone Other (See Comments)     Troubles sleeping, restless feeling.      Penicillins      Tramadol Nausea and Vomiting       FAMILY HISTORY:  Family History   Problem Relation Age of Onset     Chronic Obstructive Pulmonary Disease Mother          age 50     Hereditary Spherocytosis Mother      Diabetes Type 2  Father      Clotting Disorder Brother         Hereditary Spherocytosis     Hereditary Spherocytosis Brother        SOCIAL HISTORY:   Social History     Socioeconomic History     Marital status: Single   Tobacco Use     Smoking status: Former Smoker     Years: 3.00     Quit date: 7/15/2020     Years since quittin.8     Smokeless tobacco: Never Used     Tobacco comment: 2 to 3 cigs a day.   Substance and Sexual Activity     Alcohol use: Not Currently     Comment: Alcoholic Drinks/day: 1 beer every few months      Drug use: Not Currently     Sexual activity: Not Currently     Partners: Male   Social History Narrative    Lives at home with mother and brother.       VITALS:  /67   Pulse 74   Temp 98.2  F (36.8  C) (Oral)   Resp 18   Ht 1.6 m (5' 3\")   Wt 77.1 kg (170 lb)   LMP 2022 (Approximate)   SpO2 100%   BMI 30.11 kg/m      PHYSICAL EXAM    Constitutional: Well developed, well nourished. Uncomfortable appearing.  HENT: Normocephalic, atraumatic, mucous membranes moist, nose normal. Neck- Supple, gross ROM intact.   Eyes: Pupils mid-range, conjunctiva without injection, no discharge.   Respiratory: Clear to auscultation " bilaterally, no respiratory distress, no wheezing, speaks full sentences easily. No cough.  Cardiovascular: Normal heart rate, regular rhythm, no murmurs. No pedal edema, 2+ DP pulses.   GI: Soft, no tenderness to deep palpation in all quadrants, no masses.  Musculoskeletal: Moving all 4 extremities intentionally and without pain. No obvious deformity. No midline L-spine tenderness. No reproducible tenderness in soft tissue or lower back.  Skin: Warm, dry, no rash.  Neurologic: Alert & oriented x 3, cranial nerves grossly intact.  Psychiatric: Affect normal, cooperative.       LAB:  All pertinent labs reviewed and interpreted.  Labs Ordered and Resulted from Time of ED Arrival to Time of ED Departure   CBC WITH PLATELETS - Abnormal       Result Value    WBC Count 15.1 (*)     RBC Count 4.09      Hemoglobin 12.7      Hematocrit 35.4      MCV 87      MCH 31.1      MCHC 35.9      RDW 13.2      Platelet Count 626 (*)    BASIC METABOLIC PANEL - Abnormal    Sodium 137      Potassium 3.4 (*)     Chloride 102      Carbon Dioxide (CO2) 26      Anion Gap 9      Urea Nitrogen 7 (*)     Creatinine 0.58 (*)     Calcium 9.0      Glucose 75      GFR Estimate >90     ROUTINE UA WITH MICROSCOPIC REFLEX TO CULTURE - Abnormal    Color Urine Light Yellow      Appearance Urine Clear      Glucose Urine Negative      Bilirubin Urine Negative      Ketones Urine 60  (*)     Specific Gravity Urine 1.012      Blood Urine Negative      pH Urine 5.5      Protein Albumin Urine Negative      Urobilinogen Urine <2.0      Nitrite Urine Negative      Leukocyte Esterase Urine Negative      Bacteria Urine Few (*)     Mucus Urine Present (*)     RBC Urine <1      WBC Urine <1      Squamous Epithelials Urine 1     HCG QUALITATIVE PREGNANCY - Abnormal    hCG Serum Qualitative Positive (*)        RADIOLOGY:  Reviewed all pertinent imaging. Please see official radiology report.  OB  US 1st trimester w transvag   Final Result   IMPRESSION:    1.  Single  living intrauterine gestation at 5 weeks 6 days, EDC 1/16/2023.   2.  No abnormalities.      US Renal Complete   Final Result   IMPRESSION:   Normal kidney ultrasound. No hydronephrosis.            I, Daisha Pride, am serving as a scribe to document services personally performed by Dr. Andrea Aragon based on my observation and the provider's statements to me. I, Andrea Aragon MD attest that Daisha Pride is acting in a scribe capacity, has observed my performance of the services and has documented them in accordance with my direction.    Andrea Aragon M.D.  Emergency Medicine  [facility] EMERGENCY ROOM  [address] [address] [phone]  Dept: [phone]      Andrea Aragon MD  05/22/22 [id number]

## 2022-05-22 NOTE — Clinical Note
Tonya Thayer was seen and treated in our emergency department on 5/22/2022.  She may return to work on 05/24/2022.       If you have any questions or concerns, please don't hesitate to call.      Andrea Aragon MD

## 2022-05-23 NOTE — DISCHARGE INSTRUCTIONS
Please take Tylenol for pain.  You can take 1000 mg at a time 3 times a day.  Do not take ibuprofen unless this has been okayed by OB.     I believe your pain is either from strain in the low back by increased spine flexibility from pregnancy, or it is from small cyst rupture that has caused significant pain.  This should improve over the next couple days.

## 2022-05-24 ENCOUNTER — NURSE TRIAGE (OUTPATIENT)
Dept: NURSING | Facility: CLINIC | Age: 27
End: 2022-05-24
Payer: COMMERCIAL

## 2022-05-24 NOTE — TELEPHONE ENCOUNTER
I'm not aware of any resources available tonight, but might suggest having her try the urgency room where they can check labs as well.

## 2022-05-24 NOTE — TELEPHONE ENCOUNTER
Hyperemesis.Gravidarum    Patient calling, and states she is just leaving [facility], where she was in waiting room  , and was seen by MD who initiated fluid, and   Zofran.she was given 1 bag of  IV fluid.  She was waiting in the ER since 10AM.at [facility]. This AM  She had same problem with 1st pregnancy,.  Hyperemesis.Gravidarum    However, she was advised at [facility] ER ,  to call her own pcp., Dr. Medina, because there were 20 patients in front of her waiting for admission and rooms at [facility].    Patient asking if her MD, Dr. Medina , can get her a bed in [facility], for fluids, and anti nausea care. [facility]    Please call patient to let her know what can be done.  [phone]     She continues to vomit, and need help.     Genia Henning RN   [facility] Nurse Advisor        Reason for Disposition    Unable to keep ANY liquids down (without vomiting) this past 24 hours    Additional Information    Negative: Vaginal bleeding and pregnant < 20 weeks    Negative: Abdominal pain and pregnant < 20 weeks    Negative: Headache is main symptom    Negative: Sounds like a life-threatening emergency to the triager    Negative: Vomiting red blood or black (coffee ground) material    Negative: Insulin-dependent diabetes (Type I) and glucose > 400 mg/dL (22 mmol/L)    Negative: Recent head injury (within last 3 days)    Negative: Recent abdominal injury (within last 3 days)    Negative: Severe pain in one eye    Negative: SEVERE vomiting (e.g., > 10 times / day) and present > 8 hours    Protocols used: PREGNANCY - MORNING SICKNESS (NAUSEA AND VOMITING OF PREGNANCY)-A-OH

## 2022-05-24 NOTE — TELEPHONE ENCOUNTER
Patient called again asking for some guidance from provider about where she can go for IV fluids as [facility] was unable to admit her due to being at capacity.    Patient is early in her pregnancy and unable to keep any fluids down for the last two days, even after Zofran in the ED.  Vomiting bile at this point.    Would ADS be appropriate for this patient?    Mara Spann RN  05/24/22 3:39 PM  [facility] Nurse Advisor      Reason for Disposition    Unable to keep ANY liquids down (without vomiting) this past 24 hours    Additional Information    Negative: Sounds like a life-threatening emergency to the triager    Negative: Vaginal bleeding and pregnant < 20 weeks    Negative: Abdominal pain and pregnant < 20 weeks    Negative: Headache is main symptom    Negative: Vomiting red blood or black (coffee ground) material    Negative: Insulin-dependent diabetes (Type I) and glucose > 400 mg/dL (22 mmol/L)    Negative: Recent head injury (within last 3 days)    Negative: Recent abdominal injury (within last 3 days)    Negative: Severe pain in one eye    Negative: SEVERE vomiting (e.g., > 10 times / day) and present > 8 hours    Protocols used: PREGNANCY - MORNING SICKNESS (NAUSEA AND VOMITING OF PREGNANCY)-A-OH

## 2022-05-25 NOTE — TELEPHONE ENCOUNTER
Patient currently at [facility]. Message left on voicemail to call us back if there's anything that she needs to discuss and that I hope she's feeling better.

## 2022-05-25 NOTE — TELEPHONE ENCOUNTER
Please call patient and let her know that she needs to be seen in the emergency room and needs evaluation and IV fluids and medications etc. for her hyperemesis.  She should present now to the emergency room for evaluation.  Unfortunately there is nothing that I or anyone else can do to get her a bed to have this accomplished, she needs to be seen and evaluated in the emergency room as a first step.

## 2022-05-25 NOTE — TELEPHONE ENCOUNTER
Additional call created for patient. She has been transferred to [facility] from [facility].

## 2022-06-02 DIAGNOSIS — F41.1 GAD (GENERALIZED ANXIETY DISORDER): ICD-10-CM

## 2022-06-04 NOTE — TELEPHONE ENCOUNTER
Routing refill request to provider for review/approval because:  Drug not on the [facility] refill protocol     Last Written Prescription Date:  1/25/22  Last Fill Quantity: 30,  # refills: 0   Last office visit provider:  3/25/22     Requested Prescriptions   Pending Prescriptions Disp Refills     LORazepam (ATIVAN) 1 MG tablet [Pharmacy Med Name: LORAZEPAM 1MG] 30 tablet 2     Sig: TAKE 1-2 TABLETS BY MOUTH TWICE DAILY AS NEEDED FOR ANXIETY       There is no refill protocol information for this order          Susan Pal RN 06/04/22 10:02 AM

## 2022-06-07 RX ORDER — LORAZEPAM 1 MG/1
TABLET ORAL
Qty: 30 TABLET | Refills: 0 | Status: SHIPPED | OUTPATIENT
Start: 2022-06-07 | End: 2022-07-12

## 2022-06-07 NOTE — TELEPHONE ENCOUNTER
Medication: Lorazepam 1mg  Last Date Filled 1/25/22  Last appointment addressing medication use: 1/25/22    CSA in last year: YES    Random Utox in last year: NO  (if any of the above answer NO - schedule with PCP)     Opioids + benzodiazepines? NO  (if the above answer YES - schedule with PCP every 6 months)       All responses suggest:

## 2022-07-12 ENCOUNTER — MYC REFILL (OUTPATIENT)
Dept: FAMILY MEDICINE | Facility: CLINIC | Age: 27
End: 2022-07-12

## 2022-07-12 DIAGNOSIS — F41.1 GAD (GENERALIZED ANXIETY DISORDER): ICD-10-CM

## 2022-07-12 RX ORDER — LORAZEPAM 1 MG/1
1-2 TABLET ORAL 2 TIMES DAILY PRN
Qty: 30 TABLET | Refills: 1 | Status: SHIPPED | OUTPATIENT
Start: 2022-07-12 | End: 2022-08-30

## 2022-08-26 ENCOUNTER — TRANSFERRED RECORDS (OUTPATIENT)
Dept: HEALTH INFORMATION MANAGEMENT | Facility: CLINIC | Age: 27
End: 2022-08-26

## 2022-08-30 ENCOUNTER — VIRTUAL VISIT (OUTPATIENT)
Dept: FAMILY MEDICINE | Facility: CLINIC | Age: 27
End: 2022-08-30
Payer: COMMERCIAL

## 2022-08-30 ENCOUNTER — VIRTUAL VISIT (OUTPATIENT)
Dept: FAMILY MEDICINE | Facility: CLINIC | Age: 27
End: 2022-08-30

## 2022-08-30 DIAGNOSIS — F41.1 GAD (GENERALIZED ANXIETY DISORDER): Primary | ICD-10-CM

## 2022-08-30 DIAGNOSIS — Z53.9 ERRONEOUS ENCOUNTER--DISREGARD: Primary | ICD-10-CM

## 2022-08-30 DIAGNOSIS — Z87.59 HISTORY OF HYPEREMESIS GRAVIDARUM: ICD-10-CM

## 2022-08-30 PROCEDURE — 99213 OFFICE O/P EST LOW 20 MIN: CPT | Mod: 95 | Performed by: FAMILY MEDICINE

## 2022-08-30 RX ORDER — NAPROXEN 500 MG/1
TABLET ORAL
COMMUNITY
Start: 2022-08-15 | End: 2023-05-09

## 2022-08-30 RX ORDER — LORAZEPAM 1 MG/1
1-2 TABLET ORAL 2 TIMES DAILY PRN
Qty: 60 TABLET | Refills: 2 | Status: SHIPPED | OUTPATIENT
Start: 2022-08-30 | End: 2023-03-07

## 2022-08-30 ASSESSMENT — ANXIETY QUESTIONNAIRES
5. BEING SO RESTLESS THAT IT IS HARD TO SIT STILL: NEARLY EVERY DAY
GAD7 TOTAL SCORE: 19
6. BECOMING EASILY ANNOYED OR IRRITABLE: NEARLY EVERY DAY
2. NOT BEING ABLE TO STOP OR CONTROL WORRYING: NEARLY EVERY DAY
7. FEELING AFRAID AS IF SOMETHING AWFUL MIGHT HAPPEN: SEVERAL DAYS
3. WORRYING TOO MUCH ABOUT DIFFERENT THINGS: NEARLY EVERY DAY
1. FEELING NERVOUS, ANXIOUS, OR ON EDGE: NEARLY EVERY DAY
GAD7 TOTAL SCORE: 19
IF YOU CHECKED OFF ANY PROBLEMS ON THIS QUESTIONNAIRE, HOW DIFFICULT HAVE THESE PROBLEMS MADE IT FOR YOU TO DO YOUR WORK, TAKE CARE OF THINGS AT HOME, OR GET ALONG WITH OTHER PEOPLE: VERY DIFFICULT
4. TROUBLE RELAXING: NEARLY EVERY DAY
8. IF YOU CHECKED OFF ANY PROBLEMS, HOW DIFFICULT HAVE THESE MADE IT FOR YOU TO DO YOUR WORK, TAKE CARE OF THINGS AT HOME, OR GET ALONG WITH OTHER PEOPLE?: VERY DIFFICULT
GAD7 TOTAL SCORE: 19
7. FEELING AFRAID AS IF SOMETHING AWFUL MIGHT HAPPEN: SEVERAL DAYS

## 2022-08-30 ASSESSMENT — PATIENT HEALTH QUESTIONNAIRE - PHQ9
SUM OF ALL RESPONSES TO PHQ QUESTIONS 1-9: 11
10. IF YOU CHECKED OFF ANY PROBLEMS, HOW DIFFICULT HAVE THESE PROBLEMS MADE IT FOR YOU TO DO YOUR WORK, TAKE CARE OF THINGS AT HOME, OR GET ALONG WITH OTHER PEOPLE: VERY DIFFICULT
SUM OF ALL RESPONSES TO PHQ QUESTIONS 1-9: 11

## 2022-08-30 NOTE — PROGRESS NOTES
This encounter was opened in error. Please disregard.    Patient called 3 times for video visit with no answer

## 2022-08-30 NOTE — PROGRESS NOTES
"Tonya is a 26 year old who is being evaluated via a billable video visit.      How would you like to obtain your AVS? MyChart  If the video visit is dropped, the invitation should be resent by: Text to cell phone: [phone]  Will anyone else be joining your video visit? No          Assessment & Plan     ZION (generalized anxiety disorder)  -Waiting on therapy/psychiatry appointment in a few weeks. Will continue on Lexapro, max dose for now and as needed lorazepam, up to 2/day. She will f/u here in 3-4 months if they are not taking over prescribing by then with the new clinic.   -Discussed r/b of medication including dependence and heightened anxiety, she will watch for issues related to this   -Is aware she can't take this consistently with pregnancy as well, considering pregnancy in the upcoming months.   - LORazepam (ATIVAN) 1 MG tablet  Dispense: 60 tablet; Refill: 2    History of hyperemesis gravidarum  -Given history of hospitalization with hyperemesis with prior pregnancies would like to hear about options for management prior to getting pregnant again, is hoping to have another child in the next year if able.   - Ob/Gyn Referral           BMI:   Estimated body mass index is 30.11 kg/m  as calculated from the following:    Height as of 5/22/22: 1.6 m (5' 3\").    Weight as of 5/22/22: 77.1 kg (170 lb).   Weight management plan: not discussed today due to nature of visit        No follow-ups on file.    Michelle Medina MD  [facility]    Lucia Castaneda is a 26 year old, presenting for the following health issues:  Recheck Medication      History of Present Illness       Reason for visit:  Medication check    She eats 2-3 servings of fruits and vegetables daily.She consumes 2 sweetened beverage(s) daily.She exercises with enough effort to increase her heart rate 60 or more minutes per day.  She exercises with enough effort to increase her heart rate 5 days per week. She is " missing 1 dose(s) of medications per week.  She is not taking prescribed medications regularly due to remembering to take.    Today's PHQ-9         PHQ-9 Total Score: 11    PHQ-9 Q9 Thoughts of better off dead/self-harm past 2 weeks :   Not at all    How difficult have these problems made it for you to do your work, take care of things at home, or get along with other people: Very difficult  Today's ZION-7 Score: 19       She is here today for a follow up. She does feel that things are going well medication wise. She did make an appointment at [facility] which is a therapist/psychiatrist. She hash ad a lot of increasing anxiety. She has had a lot going on in the last few months. She does hope that this will help.     She does think that she used to get 60 of her lorazepam/month and now is taking 2/day. She does usually have to take one in the morning and one at night. She is not sleeping ifs he doesn't take the medication. She does have this scheduled for .     She does note that with the propranolol she will feel hypervigilant/hyperaware. She will feel her heart rate more. She does find that she can feel dizzy/nauseated during the day. She does wait until she really needs to take her medication prior to getting quite panicky.     The vistaril did help sometimes, sometimes it would make her feel like her head was on fire. She felt like her head was hot, not all the time.     She did have an  as she was getting quite sick with pregnancy. She does want more kids eventually. She does think that this would be better sooner than later. She had hyperemesis gravidarum. She does wonder about management of this in future pregnancies. She did not have any relief even with 8 mg. The only thing that helped was when she was in the hospital and getting fluids and then would feel a little ok. She can feel ok with the little bit of ok.     Review of Systems   Per above      Objective    Vitals - Patient  Reported  SpO2 (Patient Reported): 97  Pulse (Patient Reported): 109        Physical Exam   GENERAL: Healthy, alert and no distress  EYES: Eyes grossly normal to inspection.  No discharge or erythema, or obvious scleral/conjunctival abnormalities.  RESP: No audible wheeze, cough, or visible cyanosis.  No visible retractions or increased work of breathing.    SKIN: Visible skin clear. No significant rash, abnormal pigmentation or lesions.  NEURO: Cranial nerves grossly intact.  Mentation and speech appropriate for age.  PSYCH: Mentation appears normal, affect normal/bright, judgement and insight intact, normal speech and appearance well-groomed.                Video-Visit Details    Video Start Time: 11:11 AM    Type of service:  Video Visit    Video End Time:11:31 AM    Originating Location (pt. Location): Home    Distant Location (provider location):  [facility]     Platform used for Video Visit: BellaWell    Thao Blackmon

## 2022-09-02 ENCOUNTER — NURSE TRIAGE (OUTPATIENT)
Dept: FAMILY MEDICINE | Facility: CLINIC | Age: 27
End: 2022-09-02

## 2022-09-02 NOTE — TELEPHONE ENCOUNTER
Patient will go to  for evaluation as there are no openings.    Reason for Disposition    Coughing up silva-colored (reddish-brown) or blood-tinged sputum    Patient wants to be seen    Additional Information    Negative: Bluish (or gray) lips or face    Negative: SEVERE difficulty breathing (e.g., struggling for each breath, speaks in single words)    Negative: Rapid onset of cough and has hives    Negative: Coughing started suddenly after medicine, an allergic food or bee sting    Negative: Difficulty breathing after exposure to flames, smoke, or fumes    Negative: Sounds like a life-threatening emergency to the triager    Negative: Previous asthma attacks and this feels like asthma attack    Negative: Dry cough (non-productive; no sputum or minimal clear sputum) and within 14 days of COVID-19 Exposure    Negative: MODERATE difficulty breathing (e.g., speaks in phrases, SOB even at rest, pulse 100-120) and still present when not coughing    Negative: Chest pain present when not coughing    Negative: Passed out (i.e., fainted, collapsed and was not responding)    Negative: Patient sounds very sick or weak to the triager    Negative: MILD difficulty breathing (e.g., minimal/no SOB at rest, SOB with walking, pulse <100) and still present when not coughing    Negative: Coughed up > 1 tablespoon (15 ml) blood (Exception: Blood-tinged sputum.)    Negative: Fever > 103 F (39.4 C)    Negative: Fever > 101 F (38.3 C) and over 60 years of age    Negative: Fever > 100.0 F (37.8 C) and has diabetes mellitus or a weak immune system (e.g., HIV positive, cancer chemotherapy, organ transplant, splenectomy, chronic steroids)    Negative: Fever > 100.0 F (37.8 C) and bedridden (e.g., nursing home patient, stroke, chronic illness, recovering from surgery)    Negative: Increasing ankle swelling    Negative: Wheezing is present    Negative: SEVERE coughing spells (e.g., whooping sound after coughing, vomiting after coughing)     Negative: Fever present > 3 days (72 hours)    Negative: Fever returns after gone for over 24 hours and symptoms worse or not improved    Negative: Using nasal washes and pain medicine > 24 hours and sinus pain persists    Negative: Known COPD or other severe lung disease (i.e., bronchiectasis, cystic fibrosis, lung surgery) and worsening symptoms (i.e., increased sputum purulence or amount, increased breathing difficulty)    Negative: Continuous (nonstop) coughing interferes with work or school and no improvement using cough treatment per Care Advice    Protocols used: COUGH-A-OH

## 2022-09-03 ENCOUNTER — HEALTH MAINTENANCE LETTER (OUTPATIENT)
Age: 27
End: 2022-09-03

## 2023-03-06 DIAGNOSIS — F41.1 GAD (GENERALIZED ANXIETY DISORDER): ICD-10-CM

## 2023-03-07 RX ORDER — LORAZEPAM 1 MG/1
1-2 TABLET ORAL 2 TIMES DAILY PRN
Qty: 30 TABLET | Refills: 0 | Status: SHIPPED | OUTPATIENT
Start: 2023-03-07 | End: 2023-05-09

## 2023-04-29 ENCOUNTER — HEALTH MAINTENANCE LETTER (OUTPATIENT)
Age: 28
End: 2023-04-29

## 2023-05-09 ENCOUNTER — VIRTUAL VISIT (OUTPATIENT)
Dept: FAMILY MEDICINE | Facility: CLINIC | Age: 28
End: 2023-05-09
Payer: COMMERCIAL

## 2023-05-09 DIAGNOSIS — F41.1 GAD (GENERALIZED ANXIETY DISORDER): ICD-10-CM

## 2023-05-09 PROCEDURE — 99213 OFFICE O/P EST LOW 20 MIN: CPT | Mod: VID | Performed by: FAMILY MEDICINE

## 2023-05-09 RX ORDER — ESCITALOPRAM OXALATE 20 MG/1
20 TABLET ORAL DAILY
Qty: 90 TABLET | Refills: 3 | Status: SHIPPED | OUTPATIENT
Start: 2023-05-09 | End: 2024-01-23

## 2023-05-09 RX ORDER — LORAZEPAM 1 MG/1
1-2 TABLET ORAL 2 TIMES DAILY PRN
Qty: 30 TABLET | Refills: 0 | Status: SHIPPED | OUTPATIENT
Start: 2023-05-09 | End: 2023-07-31

## 2023-05-09 ASSESSMENT — ANXIETY QUESTIONNAIRES
GAD7 TOTAL SCORE: 12
GAD7 TOTAL SCORE: 12
2. NOT BEING ABLE TO STOP OR CONTROL WORRYING: MORE THAN HALF THE DAYS
8. IF YOU CHECKED OFF ANY PROBLEMS, HOW DIFFICULT HAVE THESE MADE IT FOR YOU TO DO YOUR WORK, TAKE CARE OF THINGS AT HOME, OR GET ALONG WITH OTHER PEOPLE?: SOMEWHAT DIFFICULT
GAD7 TOTAL SCORE: 12
IF YOU CHECKED OFF ANY PROBLEMS ON THIS QUESTIONNAIRE, HOW DIFFICULT HAVE THESE PROBLEMS MADE IT FOR YOU TO DO YOUR WORK, TAKE CARE OF THINGS AT HOME, OR GET ALONG WITH OTHER PEOPLE: SOMEWHAT DIFFICULT
1. FEELING NERVOUS, ANXIOUS, OR ON EDGE: SEVERAL DAYS
7. FEELING AFRAID AS IF SOMETHING AWFUL MIGHT HAPPEN: SEVERAL DAYS
5. BEING SO RESTLESS THAT IT IS HARD TO SIT STILL: NEARLY EVERY DAY
4. TROUBLE RELAXING: SEVERAL DAYS
6. BECOMING EASILY ANNOYED OR IRRITABLE: MORE THAN HALF THE DAYS
7. FEELING AFRAID AS IF SOMETHING AWFUL MIGHT HAPPEN: SEVERAL DAYS
3. WORRYING TOO MUCH ABOUT DIFFERENT THINGS: MORE THAN HALF THE DAYS

## 2023-05-09 ASSESSMENT — PATIENT HEALTH QUESTIONNAIRE - PHQ9
SUM OF ALL RESPONSES TO PHQ QUESTIONS 1-9: 6
SUM OF ALL RESPONSES TO PHQ QUESTIONS 1-9: 6
10. IF YOU CHECKED OFF ANY PROBLEMS, HOW DIFFICULT HAVE THESE PROBLEMS MADE IT FOR YOU TO DO YOUR WORK, TAKE CARE OF THINGS AT HOME, OR GET ALONG WITH OTHER PEOPLE: SOMEWHAT DIFFICULT
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
## 2023-05-09 NOTE — LETTER
My Depression Action Plan  Name: Tonya Thayer   Date of Birth 1995  Date: 5/9/2023    My doctor: Michelle Medina   My clinic: [facility] [place]  [address]  [place]  [place] [facility] [address]  [phone]            GREEN    ZONE   Good Control    What it looks like:     Things are going generally well. You have normal ups and downs. You may even feel depressed from time to time, but bad moods usually last less than a day.   What you need to do:  1. Continue to care for yourself (see self care plan)  2. Check your depression survival kit and update it as needed  3. Follow your physician s recommendations including any medication.  4. Do not stop taking medication unless you consult with your physician first.             YELLOW         ZONE Getting Worse    What it looks like:     Depression is starting to interfere with your life.     It may be hard to get out of bed; you may be starting to isolate yourself from others.    Symptoms of depression are starting to last most all day and this has happened for several days.     You may have suicidal thoughts but they are not constant.   What you need to do:     1. Call your care team. Your response to treatment will improve if you keep your care team informed of your progress. Yellow periods are signs an adjustment may need to be made.     2. Continue your self-care.  Just get dressed and ready for the day.  Don't give yourself time to talk yourself out of it.    3. Talk to someone in your support network.    4. Open up your Depression Self-Care Plan/Wellness Kit.             RED    ZONE Medical Alert - Get Help    What it looks like:     Depression is seriously interfering with your life.     You may experience these or other symptoms: You can t get out of bed most days, can t work or engage in other necessary activities, you have trouble taking care of basic hygiene, or basic  responsibilities, thoughts of suicide or death that will not go away, self-injurious behavior.     What you need to do:  1. Call your care team and request a same-day appointment. If they are not available (weekends or after hours) call your local crisis line, emergency room or 911.          Depression Self-Care Plan / Wellness Kit    Many people find that medication and therapy are helpful treatments for managing depression. In addition, making small changes to your everyday life can help to boost your mood and improve your wellbeing. Below are some tips for you to consider. Be sure to talk with your medical provider and/or behavioral health consultant if your symptoms are worsening or not improving.     Sleep   Sleep hygiene  means all of the habits that support good, restful sleep. It includes maintaining a consistent bedtime and wake time, using your bedroom only for sleeping or sex, and keeping the bedroom dark and free of distractions like a computer, smartphone, or television.     Develop a Healthy Routine  Maintain good hygiene. Get out of bed in the morning, make your bed, brush your teeth, take a shower, and get dressed. Don t spend too much time viewing media that makes you feel stressed. Find time to relax each day.    Exercise  Get some form of exercise every day. This will help reduce pain and release endorphins, the  feel good  chemicals in your brain. It can be as simple as just going for a walk or doing some gardening, anything that will get you moving.      Diet  Strive to eat healthy foods, including fruits and vegetables. Drink plenty of water. Avoid excessive sugar, caffeine, alcohol, and other mood-altering substances.     Stay Connected with Others  Stay in touch with friends and family members.    Manage Your Mood  Try deep breathing, massage therapy, biofeedback, or meditation. Take part in fun activities when you can. Try to find something to smile about each day.     Psychotherapy  Be open  to working with a therapist if your provider recommends it.     Medication  Be sure to take your medication as prescribed. Most anti-depressants need to be taken every day. It usually takes several weeks for medications to work. Not all medicines work for all people. It is important to follow-up with your provider to make sure you have a treatment plan that is working for you. Do not stop your medication abruptly without first discussing it with your provider.    Crisis Resources   These hotlines are for both adults and children. They and are open 24 hours a day, 7 days a week unless noted otherwise.      National Suicide Prevention Lifeline   988 or 7-200-995-VECM (9676)      Crisis Text Line    www.crisistextline.org  Text HOME to 215432 from anywhere in the [place], anytime, about any type of crisis. A live, trained crisis counselor will receive the text and respond quickly.      [place] Lifeline for LGBTQ Youth  A national crisis intervention and suicide lifeline for LGBTQ youth under 25. Provides a safe place to talk without judgement. Call 1-743.973.2944; text START to 495431 or visit www.thePayScalevorproject.org to talk to a trained counselor.      For [facility] crisis numbers, visit the [facility] website at:  https://[place].gov/dhs/people-we-serve/adults/health-care/mental-health/resources/crisis-contacts.jsp

## 2023-07-31 DIAGNOSIS — R11.0 NAUSEA: ICD-10-CM

## 2023-07-31 DIAGNOSIS — F41.1 GAD (GENERALIZED ANXIETY DISORDER): ICD-10-CM

## 2023-07-31 RX ORDER — LORAZEPAM 1 MG/1
1-2 TABLET ORAL 2 TIMES DAILY PRN
Qty: 30 TABLET | Refills: 0 | Status: SHIPPED | OUTPATIENT
Start: 2023-07-31 | End: 2023-11-07

## 2023-07-31 RX ORDER — ONDANSETRON 4 MG/1
4 TABLET, ORALLY DISINTEGRATING ORAL EVERY 8 HOURS PRN
Qty: 30 TABLET | Refills: 2 | Status: SHIPPED | OUTPATIENT
Start: 2023-07-31 | End: 2024-04-08

## 2023-08-11 ENCOUNTER — OFFICE VISIT (OUTPATIENT)
Dept: FAMILY MEDICINE | Facility: CLINIC | Age: 28
End: 2023-08-11

## 2023-08-11 VITALS
RESPIRATION RATE: 12 BRPM | DIASTOLIC BLOOD PRESSURE: 76 MMHG | HEART RATE: 106 BPM | BODY MASS INDEX: 29.04 KG/M2 | WEIGHT: 174.3 LBS | TEMPERATURE: 98.2 F | SYSTOLIC BLOOD PRESSURE: 114 MMHG | OXYGEN SATURATION: 97 % | HEIGHT: 65 IN

## 2023-08-11 DIAGNOSIS — J22 LOWER RESPIRATORY INFECTION: Primary | ICD-10-CM

## 2023-08-11 PROCEDURE — 99213 OFFICE O/P EST LOW 20 MIN: CPT | Performed by: NURSE PRACTITIONER

## 2023-08-11 PROCEDURE — 87635 SARS-COV-2 COVID-19 AMP PRB: CPT | Performed by: NURSE PRACTITIONER

## 2023-08-11 RX ORDER — ALBUTEROL SULFATE 90 UG/1
2 AEROSOL, METERED RESPIRATORY (INHALATION) EVERY 6 HOURS PRN
Qty: 18 G | Refills: 0 | Status: SHIPPED | OUTPATIENT
Start: 2023-08-11 | End: 2024-01-23

## 2023-08-11 RX ORDER — PREDNISONE 20 MG/1
TABLET ORAL
Qty: 10 TABLET | Refills: 0 | Status: SHIPPED | OUTPATIENT
Start: 2023-08-11 | End: 2023-08-16

## 2023-08-11 RX ORDER — LIDOCAINE HYDROCHLORIDE 20 MG/ML
15 SOLUTION OROPHARYNGEAL
COMMUNITY
Start: 2022-09-02 | End: 2024-04-08

## 2023-08-11 RX ORDER — AZITHROMYCIN 250 MG/1
TABLET, FILM COATED ORAL
Qty: 6 TABLET | Refills: 0 | Status: SHIPPED | OUTPATIENT
Start: 2023-08-11 | End: 2023-08-16

## 2023-08-11 ASSESSMENT — PAIN SCALES - GENERAL: PAINLEVEL: SEVERE PAIN (7)

## 2023-08-11 ASSESSMENT — ENCOUNTER SYMPTOMS: COUGH: 1

## 2023-08-11 ASSESSMENT — PATIENT HEALTH QUESTIONNAIRE - PHQ9
10. IF YOU CHECKED OFF ANY PROBLEMS, HOW DIFFICULT HAVE THESE PROBLEMS MADE IT FOR YOU TO DO YOUR WORK, TAKE CARE OF THINGS AT HOME, OR GET ALONG WITH OTHER PEOPLE: SOMEWHAT DIFFICULT
SUM OF ALL RESPONSES TO PHQ QUESTIONS 1-9: 9
SUM OF ALL RESPONSES TO PHQ QUESTIONS 1-9: 9

## 2023-08-11 NOTE — PROGRESS NOTES
Subjective   Tonya is a 27 year old, presenting for the following health issues:  Cough (Chest cough/ pain x 2 days. Pt states it hurts when cough. Pt states she coughs so bad she throws up. Tried OTC medications but nothing has been helping. )      8/11/2023     3:33 PM   Additional Questions   Roomed by Viry MUSA CMA       Cough    History of Present Illness       Reason for visit:  Cough / Sore throat  Symptom onset:  1-3 days ago  Symptoms include:  Cough, sore throat, fatique  Symptom intensity:  Moderate  Symptom progression:  Worsening  Had these symptoms before:  Yes  Has tried/received treatment for these symptoms:  Yes  Previous treatment was successful:  Yes  Prior treatment description:  Cough and pain medicine  What makes it worse:  Eating and swallowing    She eats 2-3 servings of fruits and vegetables daily.She consumes 1 sweetened beverage(s) daily.She exercises with enough effort to increase her heart rate 60 or more minutes per day.  She exercises with enough effort to increase her heart rate 5 days per week. She is missing 1 dose(s) of medications per week.  She is not taking prescribed medications regularly due to remembering to take.       Acute Illness  Acute illness concerns: Cough, chills  Onset/Duration: 2-3 days  Symptoms:  Fever: unsure  Chills/Sweats: YES  Headache (location?): YES  Sinus Pressure: YES  Conjunctivitis:  No  Ear Pain: no  Rhinorrhea: No  Congestion: YES  Sore Throat: yes  Cough: YES - productive of thick, green phlegm   Wheeze: No  Decreased Appetite: No  Nausea: No  Vomiting: No  Diarrhea: No  Dysuria/Freq.: No  Dysuria or Hematuria: No  Fatigue/Achiness: YES  Sick/Strep Exposure: No  Therapies tried and outcome: None        Review of Systems   Respiratory:  Positive for cough.       Constitutional, HEENT, cardiovascular, pulmonary, gi and gu systems are negative, except as otherwise noted.      Objective    /76 (BP Location: Left arm, Patient Position: Sitting,  "Cuff Size: Adult Regular)   Pulse 106   Temp 98.2  F (36.8  C) (Oral)   Resp 12   Ht 1.638 m (5' 4.5\")   Wt 79.1 kg (174 lb 4.8 oz)   LMP 07/24/2023 (Within Days)   SpO2 97%   Breastfeeding No   BMI 29.46 kg/m    Body mass index is 29.46 kg/m .  Physical Exam   GENERAL: alert, no distress, pale, and fatigued  HENT: ear canals and TM's normal, nasal mucosa edematous , and oral mucous membranes moist  NECK: no adenopathy, no asymmetry, masses, or scars and thyroid normal to palpation  RESP:  rales lower lobes- clears with cough   CV: regular rate and rhythm, normal S1 S2, no S3 or S4, no murmur, click or rub, no peripheral edema and peripheral pulses strong  MS: no gross musculoskeletal defects noted, no edema  PSYCH: mentation appears normal, affect normal/bright    A/P:  (J22) Lower respiratory infection  (primary encounter diagnosis)  Comment:   Plan: Symptomatic COVID-19 Virus (Coronavirus) by PCR        Nose, albuterol (PROAIR HFA/PROVENTIL         HFA/VENTOLIN HFA) 108 (90 Base) MCG/ACT         inhaler, azithromycin (ZITHROMAX) 250 MG         tablet, predniSONE (DELTASONE) 20 MG tablet        Instructed on use of medication, potential side effects and adverse reactions.                     "

## 2023-08-11 NOTE — LETTER
August 11, 2023      Tonya Thayer  [address] [place] [address]        To Whom It May Concern:    Tonya Thayer was seen in our clinic. She may return to work without restrictions.      Sincerely,        Radha Evangelista, CNP

## 2023-08-12 LAB — SARS-COV-2 RNA RESP QL NAA+PROBE: NEGATIVE

## 2023-08-13 ENCOUNTER — MYC MEDICAL ADVICE (OUTPATIENT)
Dept: FAMILY MEDICINE | Facility: CLINIC | Age: 28
End: 2023-08-13

## 2023-08-14 NOTE — TELEPHONE ENCOUNTER
Attempted to call patient but phone is not in service.    Left my chart message.    JAYCEE MaxwellN RN  [facility]

## 2023-08-15 ENCOUNTER — TELEPHONE (OUTPATIENT)
Dept: FAMILY MEDICINE | Facility: CLINIC | Age: 28
End: 2023-08-15

## 2023-08-15 ENCOUNTER — MYC MEDICAL ADVICE (OUTPATIENT)
Dept: FAMILY MEDICINE | Facility: CLINIC | Age: 28
End: 2023-08-15

## 2023-08-15 NOTE — TELEPHONE ENCOUNTER
S-(situation): My Chart message from 8/14:I ve been taking all the medications and home remedies. I feel like my cough is getting better. But now my ears are completely clogged and I can t hear anything. Is there drops or anything I can do at home to relieve pressure? I ve used congestions meds, allergy meds, etc. but not being able to hear sucks and it s a lot of pressure in my ears and head. This just started this morning.     B-(background): At appointment on 8/11 for congestion, she was prescribed prednisone X5 days, albuterol inhaler and Zithromax.      A-(assessment): Writer attempted to call patient yesterday, but no phone service.  She did reply on a my chart message and stated that she has no fever, but does have ear pain and did start taking OTC Zyrtec and plans to again today.  She is wanting to avoid an office visit as her insurance has lapsed.      R-(recommendations): Please advise if rx to be sent in or if would like follow up in a couple days to see if current meds have worked or other recommendations.    Joaquina Lucio, JAYCEEN RN  [facility]

## 2023-08-15 NOTE — TELEPHONE ENCOUNTER
My Chart message sent with response from Radha Evangelista NP.    Joaquina Lucio, JAYCEEN RN  [facility]

## 2023-08-17 ENCOUNTER — NURSE TRIAGE (OUTPATIENT)
Dept: FAMILY MEDICINE | Facility: CLINIC | Age: 28
End: 2023-08-17

## 2023-08-17 ENCOUNTER — MYC MEDICAL ADVICE (OUTPATIENT)
Dept: FAMILY MEDICINE | Facility: CLINIC | Age: 28
End: 2023-08-17

## 2023-08-17 ENCOUNTER — TELEPHONE (OUTPATIENT)
Dept: FAMILY MEDICINE | Facility: CLINIC | Age: 28
End: 2023-08-17

## 2023-08-17 NOTE — TELEPHONE ENCOUNTER
Nurse Triage SBAR    Is this a 2nd Level Triage? NO    Situation: Patient experiencing continued respiratory symptoms, unable to return to work.    Background: Office visit 8/11/2023 with Radha Evangelista NP for lower respiratory infection.    Assessment: Patient reports that she is taking medications as prescribed. She reports continued cough, chest congestion, nasal congestion. Produces green sputum. Fatigues easily with activity such as going to the store or going up stairs. Overall not feeling better - feels better only in bursts if she takes and steam shower and when the medications are taken at the same time. Feels worse as medications wear off/ next dose due. Can have a hard time catching her breath during coughing spells - uses inhaler as needed.     Protocol Recommended Disposition:   See PCP Within 24 Hours    Recommendation: Continue home remedies and rx as prescribed. Advised to call back if symptoms worsen or questions. Advised no office visits available today or tomorrow. Provided information about walk-in clinic at [place] - patient plans to go there upon opening at 10a tomorrow. Patient is requesting work note for today.    Routed to provider for review and work note needed.    Does the patient meet one of the following criteria for ADS visit consideration? 16+ years old, with an [facility] PCP     TIP  Providers, please consider if this condition is appropriate for management at one of our Acute and Diagnostic Services sites.     If patient is a good candidate, please use dotphrase <dot>triageresponse and select Refer to ADS to document.      Reason for Disposition   [1] Continuous (nonstop) coughing interferes with work or school AND [2] no improvement using cough treatment per Care Advice    Additional Information   Negative: SEVERE difficulty breathing (e.g., struggling for each breath, speaks in single words)   Negative: Bluish (or gray) lips or face now   Negative: [1] Difficulty breathing AND [2]  "exposure to flames, smoke, or fumes   Negative: [1] Stridor AND [2] difficulty breathing   Negative: Sounds like a life-threatening emergency to the triager   Negative: [1] Previous asthma attacks AND [2] this feels like asthma attack   Negative: Dry cough (non-productive;  no sputum or minimal clear sputum)   Negative: [1] MODERATE difficulty breathing (e.g., speaks in phrases, SOB even at rest, pulse 100-120) AND [2] still present when not coughing   Negative: Chest pain  (Exception: MILD central chest pain, present only when coughing)   Negative: Patient sounds very sick or weak to the triager   Negative: [1] MILD difficulty breathing (e.g., minimal/no SOB at rest, SOB with walking, pulse <100) AND [2] still present when not coughing   Negative: [1] Coughed up blood AND [2] > 1 tablespoon (15 ml)  (Exception: Blood-tinged sputum.)   Negative: Fever > 103 F (39.4 C)   Negative: [1] Fever > 101 F (38.3 C) AND [2] age > 60 years   Negative: [1] Fever > 100.0 F (37.8 C) AND [2] bedridden (e.g., nursing home patient, CVA, chronic illness, recovering from surgery)   Negative: [1] Fever > 100.0 F (37.8 C) AND [2] diabetes mellitus or weak immune system (e.g., HIV positive, cancer chemo, splenectomy, organ transplant, chronic steroids)   Negative: Wheezing is present   Negative: SEVERE coughing spells (e.g., whooping sound after coughing, vomiting after coughing)    Answer Assessment - Initial Assessment Questions  1. ONSET: \"When did the cough begin?\"       Ongoing cough. Will feel better after steam shower and meds for a little while but when it wears off, tired. Fatigue. Chest congestion.  2. SEVERITY: \"How bad is the cough today?\"       Several times per hour due to post nasal drip.  3. SPUTUM: \"Describe the color of your sputum\" (none, dry cough; clear, white, yellow, green)      Bright neon green sputum. During day is runny, at night or morning is really thick.  4. HEMOPTYSIS: \"Are you coughing up any blood?\" If so " "ask: \"How much?\" (flecks, streaks, tablespoons, etc.)      No  5. DIFFICULTY BREATHING: \"Are you having difficulty breathing?\" If Yes, ask: \"How bad is it?\" (e.g., mild, moderate, severe)     - MILD: No SOB at rest, mild SOB with walking, speaks normally in sentences, can lie down, no retractions, pulse < 100.     - MODERATE: SOB at rest, SOB with minimal exertion and prefers to sit, cannot lie down flat, speaks in phrases, mild retractions, audible wheezing, pulse 100-120.     - SEVERE: Very SOB at rest, speaks in single words, struggling to breathe, sitting hunched forward, retractions, pulse > 120       Coughing attacks make it hard to catch breath so will use inhaler. Going upstairs or up doing stuff can get out of breath.  6. FEVER: \"Do you have a fever?\" If Yes, ask: \"What is your temperature, how was it measured, and when did it start?\"      None - no fever entire time  7. CARDIAC HISTORY: \"Do you have any history of heart disease?\" (e.g., heart attack, congestive heart failure)       Denies - had htn post covid and no longer uses beta blocker  8. LUNG HISTORY: \"Do you have any history of lung disease?\"  (e.g., pulmonary embolus, asthma, emphysema)      Denies  9. PE RISK FACTORS: \"Do you have a history of blood clots?\" (or: recent major surgery, recent prolonged travel, bedridden)      Denies  10. OTHER SYMPTOMS: \"Do you have any other symptoms?\" (e.g., runny nose, wheezing, chest pain)        Chest congestion, wheezing, cough, nasal congestion, runny nose  11. PREGNANCY: \"Is there any chance you are pregnant?\" \"When was your last menstrual period?\"        Denies- irregular. Gets every 4-5 weeks, due to get next week.  12. TRAVEL: \"Have you traveled out of the country in the last month?\" (e.g., travel history, exposures)        denies    Protocols used: Cough - Acute Hbdldevxyc-R-ZO      Anisha RUIZ, RN, PHN  [facility]"

## 2023-08-17 NOTE — TELEPHONE ENCOUNTER
Joaquina Lucio RN called Tonya on 8/17 and left a message to return call to clinic. If patient calls back, please route to any available RN to triage.  Please schedule an appointment to be seen in clinic as soon as able.    SARA Maxwell RN  [facility]

## 2023-08-17 NOTE — LETTER
[facility]  [address] [place] [facility] [address]  Phone: [phone]  Fax: [phone]    August 17, 2023        Tonya Thayer  [address] [place] [address]          To Whom it may concern:    RE: Tonya Thayer    Please excuse from work 8/17-8/18/23 due to illness.     Please contact me for questions or concerns.      Sincerely,        Radha Evangelista, CNP

## 2023-08-17 NOTE — TELEPHONE ENCOUNTER
See nurse triage encounter.    Joaquina Lucio, JAYCEEN RN  [facility]

## 2023-08-17 NOTE — TELEPHONE ENCOUNTER
My chart message sent after huddling with Radha.    Joaquina Lucio, JAYCEEN RN  [facility]    
clear

## 2023-11-07 DIAGNOSIS — F41.1 GAD (GENERALIZED ANXIETY DISORDER): ICD-10-CM

## 2023-11-07 RX ORDER — LORAZEPAM 1 MG/1
1-2 TABLET ORAL 2 TIMES DAILY PRN
Qty: 30 TABLET | Refills: 0 | Status: SHIPPED | OUTPATIENT
Start: 2023-11-07 | End: 2024-01-23

## 2023-11-07 NOTE — TELEPHONE ENCOUNTER
Refill sent, patient is due for an appointment though if we can help schedule, either medication check or wellness visit is fine with me.

## 2023-12-31 ENCOUNTER — MYC MEDICAL ADVICE (OUTPATIENT)
Dept: FAMILY MEDICINE | Facility: CLINIC | Age: 28
End: 2023-12-31

## 2024-01-02 NOTE — TELEPHONE ENCOUNTER
Last in person visit with PCP Jan 2022. Mychart response sent to patient stating 1/3/23 appt should be in person.

## 2024-01-23 ENCOUNTER — OFFICE VISIT (OUTPATIENT)
Dept: FAMILY MEDICINE | Facility: CLINIC | Age: 29
End: 2024-01-23
Payer: COMMERCIAL

## 2024-01-23 VITALS
DIASTOLIC BLOOD PRESSURE: 74 MMHG | OXYGEN SATURATION: 98 % | WEIGHT: 182.6 LBS | HEIGHT: 65 IN | TEMPERATURE: 98 F | HEART RATE: 82 BPM | BODY MASS INDEX: 30.42 KG/M2 | SYSTOLIC BLOOD PRESSURE: 126 MMHG | RESPIRATION RATE: 16 BRPM

## 2024-01-23 DIAGNOSIS — Z23 IMMUNIZATION DUE: ICD-10-CM

## 2024-01-23 DIAGNOSIS — F41.1 GAD (GENERALIZED ANXIETY DISORDER): Primary | ICD-10-CM

## 2024-01-23 PROCEDURE — 90686 IIV4 VACC NO PRSV 0.5 ML IM: CPT | Performed by: FAMILY MEDICINE

## 2024-01-23 PROCEDURE — 90471 IMMUNIZATION ADMIN: CPT | Performed by: FAMILY MEDICINE

## 2024-01-23 PROCEDURE — 99214 OFFICE O/P EST MOD 30 MIN: CPT | Mod: 25 | Performed by: FAMILY MEDICINE

## 2024-01-23 RX ORDER — ESCITALOPRAM OXALATE 20 MG/1
20 TABLET ORAL DAILY
Qty: 90 TABLET | Refills: 3 | Status: SHIPPED | OUTPATIENT
Start: 2024-01-23 | End: 2024-09-10

## 2024-01-23 RX ORDER — LORAZEPAM 1 MG/1
1-2 TABLET ORAL 2 TIMES DAILY PRN
Qty: 30 TABLET | Refills: 2 | Status: SHIPPED | OUTPATIENT
Start: 2024-01-23 | End: 2024-08-07

## 2024-01-23 ASSESSMENT — ANXIETY QUESTIONNAIRES
7. FEELING AFRAID AS IF SOMETHING AWFUL MIGHT HAPPEN: MORE THAN HALF THE DAYS
5. BEING SO RESTLESS THAT IT IS HARD TO SIT STILL: NEARLY EVERY DAY
2. NOT BEING ABLE TO STOP OR CONTROL WORRYING: MORE THAN HALF THE DAYS
1. FEELING NERVOUS, ANXIOUS, OR ON EDGE: NEARLY EVERY DAY
7. FEELING AFRAID AS IF SOMETHING AWFUL MIGHT HAPPEN: MORE THAN HALF THE DAYS
3. WORRYING TOO MUCH ABOUT DIFFERENT THINGS: MORE THAN HALF THE DAYS
4. TROUBLE RELAXING: NEARLY EVERY DAY
GAD7 TOTAL SCORE: 16
6. BECOMING EASILY ANNOYED OR IRRITABLE: SEVERAL DAYS
8. IF YOU CHECKED OFF ANY PROBLEMS, HOW DIFFICULT HAVE THESE MADE IT FOR YOU TO DO YOUR WORK, TAKE CARE OF THINGS AT HOME, OR GET ALONG WITH OTHER PEOPLE?: SOMEWHAT DIFFICULT
GAD7 TOTAL SCORE: 16
GAD7 TOTAL SCORE: 16
IF YOU CHECKED OFF ANY PROBLEMS ON THIS QUESTIONNAIRE, HOW DIFFICULT HAVE THESE PROBLEMS MADE IT FOR YOU TO DO YOUR WORK, TAKE CARE OF THINGS AT HOME, OR GET ALONG WITH OTHER PEOPLE: SOMEWHAT DIFFICULT

## 2024-01-23 ASSESSMENT — PATIENT HEALTH QUESTIONNAIRE - PHQ9
10. IF YOU CHECKED OFF ANY PROBLEMS, HOW DIFFICULT HAVE THESE PROBLEMS MADE IT FOR YOU TO DO YOUR WORK, TAKE CARE OF THINGS AT HOME, OR GET ALONG WITH OTHER PEOPLE: SOMEWHAT DIFFICULT
SUM OF ALL RESPONSES TO PHQ QUESTIONS 1-9: 7
SUM OF ALL RESPONSES TO PHQ QUESTIONS 1-9: 7

## 2024-01-23 ASSESSMENT — PAIN SCALES - GENERAL: PAINLEVEL: NO PAIN (0)

## 2024-01-23 NOTE — PROGRESS NOTES
"  Assessment & Plan     ZION (generalized anxiety disorder)  -Recent exacerbation in her symptoms related to her axe being out of treatment and having a relapse.  Continue on the Lexapro scheduled, she will continue working with her therapist as well and renewal of her lorazepam.  Will start with 30 tablets/month and if this is not working we can increase it in the short-term as needed.  She will follow-up in 4 to 6 months for recheck.  - escitalopram (LEXAPRO) 20 MG tablet  Dispense: 90 tablet; Refill: 3  - LORazepam (ATIVAN) 1 MG tablet  Dispense: 30 tablet; Refill: 2    Immunization due  -Updated flu today and will follow up in clinic for meningitis and prevnar.   - MENINGOCOCCAL (MENQUADFI ) (2 YRS - 55 YRS)  - Pneumococcal 20 Valent Conjugate (Prevnar 20)  - INFLUENZA VACCINE IM > 6 MONTHS VALENT IIV4 (AFLURIA/FLUZONE)            BMI  Estimated body mass index is 30.86 kg/m  as calculated from the following:    Height as of this encounter: 1.638 m (5' 4.5\").    Weight as of this encounter: 82.8 kg (182 lb 9.6 oz).   Weight management plan: Discussed healthy diet and exercise guidelines        Lucia Castaneda is a 28 year old, presenting for the following health issues:  Recheck Medication      1/23/2024     8:51 AM   Additional Questions   Roomed by  LPN     History of Present Illness       Mental Health Follow-up:  Patient presents to follow-up on Depression & Anxiety.Patient's depression since last visit has been:  Good  The patient is not having other symptoms associated with depression.  Patient's anxiety since last visit has been:  Better  The patient is having other symptoms associated with anxiety.  Any significant life events: relationship concerns and job concerns  Patient is feeling anxious or having panic attacks.  Patient has no concerns about alcohol or drug use.    She eats 4 or more servings of fruits and vegetables daily.She consumes 2 sweetened beverage(s) daily.She exercises with enough " "effort to increase her heart rate 30 to 60 minutes per day.  She exercises with enough effort to increase her heart rate 5 days per week. She is missing 4 dose(s) of medications per week.       She is here today for a medication check.     She has been struggling with her ex, he did go to treatment. Her ex does want to see their kiddo but has not been able to stay sober and that has been challenging. 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 Does keep her busy as well. She has been having some increasing anxiety in the last few weeks, seeing her therapist every month meche now, was doing it two times a week previously.     She does have a referral for a psychiatrist from her therapist.     This week she has had a panic attack two times a day.         Objective    /74 (BP Location: Right arm, Patient Position: Sitting, Cuff Size: Adult Regular)   Pulse 82   Temp 98  F (36.7  C) (Oral)   Resp 16   Ht 1.638 m (5' 4.5\")   Wt 82.8 kg (182 lb 9.6 oz)   LMP 01/07/2024 (Within Days)   SpO2 98%   Breastfeeding No   BMI 30.86 kg/m    Body mass index is 30.86 kg/m .  Physical Exam   GENERAL: alert and no distress  MS: no gross musculoskeletal defects noted, no edema  PSYCH: mentation appears normal, affect normal/bright          Signed Electronically by: Michelle Medina MD    "

## 2024-02-12 NOTE — PROGRESS NOTES
#3 Gelsyn3 inj pt prov L knee      Subjective    Patient ID: Nirali Calhoun is a 30 y.o. female.    Chief Complaint: Pain of the Left Knee  NIRALI CALHOUN is a very pleasant 30 year F who is well-known to me from previous visits, please see my notes in the chart for complete details and treatment course. Briefly, she initially presented to clinic on 10/15/2018 with chief complaint of left knee pain that began approximately 6 months prior to presentation with an acute exacerbation after she took a trip to [place] and was walking around more than usual. She has had an abnormal gait since birth. She was diagnosed with arthritis, knee effusion, and patellofemoral syndrome. She was initially treated conservatively, but had to have a steroid injection and now has been getting viscosupplementation injections approximately every 6 to 7 months.  Her most recent series of viscosupplementation injections in June 2023. She returns today to begin a new series of viscosupplementation injections with Gelsyn-3. She denies any new injury or trauma to the knee. She is accompanied by both parents who were present for the entire interview, exam, and procedure.     2/5/2024: Nirali returns for her second injection in the series of 3.  She states the knee is feeling better after the first injection.  Her mother was present for the entire visit.  She is doing better.  She denies any new injury or trauma to the knee.  She is comfortable moving forward with the next injection in the series.    5/12/2024: Nirali returns for her third and final injection in series of 3.  She states the knee is much better.  She has been dancing around much more.  She denies any new injury or trauma to the knee.  Both her and her mother are comfortable moving forward with the next injection in the series.    Objective   Left knee examination:  On inspection there is a mild effusion of the left knee joint, no ecchymosis or erythema present on the  ASSESSMENT/PLAN:       1. Anxiety  -The patient's anxiety is slightly worse right now likely related to life events with her recent job loss and getting her dog put to sleep.  For now we will continue on the current medication, have her follow-up in approximately 6-8 weeks for recheck and if things are continuing to be poor we can consider additional medication.  She will continue to work with mental health counseling as well.  - venlafaxine (EFFEXOR-XR) 150 MG 24 hr capsule; Take 1 capsule (150 mg total) by mouth daily.  Dispense: 90 capsule; Refill: 1  - LORazepam (ATIVAN) 1 MG tablet; Take 1 tablet (1 mg total) by mouth 2 (two) times a day as needed for anxiety.  Dispense: 60 tablet; Refill: 0    2. Contraception management  -She is doing well with the birth control.  Continue on current medication and follow-up in 1 year.  - norgestimate-ethinyl estradiol (ORTHO TRI-CYCLEN LO, 28,) 0.18/0.215/0.25 mg-25 mcg Tab tablet; Take 1 tablet by mouth daily.  Dispense: 84 tablet; Refill: 3    3. ZION (generalized anxiety disorder)  -She is doing well with the medication, she is taking this daily.  - atenolol (TENORMIN) 25 MG tablet; Take 1 tab daily for anxiety, may repeat if needed once  Dispense: 90 tablet; Refill: 1    4. Motion sickness  -She will try some Dramamine, cautiously with the lorazepam but she can also try a a seaband as well.  Follow-up if this is not improving.        Michelle Medina MD      PROGRESS NOTE   1/24/2018    SUBJECTIVE:  Tonya Thayer is a 22 y.o. female  who presents for follow up on anxiety.     Things were going good for a while, but in the last month tings have been getting worse and she has had a flare. She had to put her dog aleyda this last month and she lost her job. She did start going to counseling in December and she likes who she is talking to. She is seeing someone named Kevin Lunsford at [facility]. She did sign a release form for us. Lately, in the last few weeks  left knee. On palpation she has no TTP over the soft tissue and bony landmarks of the knee joint. She is able to flex to approximately 140Â°without pain. She can fully extend her knee. She has 5/5 strength in flexion and extension of her left knee joint. Lachman test negative, negative Fabricio's. There is a positive J sign. There is mild instability of the patella. She has a weak VMO. There is some mild laxity in the MCL. She has a small effusion. No significant change from previous.    Image Results:  No new imaging.    L Inj/Asp: L knee on 2/12/2024 10:35 AM  Indications: pain  Details: 22 G needle, ultrasound-guided superolateral approach  Medications: 16.8 mg sodium hyaluronate 16.8 mg/2 mL  Outcome: tolerated well, no immediate complications    Ultrasound-guided left knee viscosupplementation injection with gelsyn-3.  Injection number 3 in a series of 3.  Risks, benefits, and alternatives were explained to the patient and verbal and written consent was obtained.  The patient was placed in supine position with a bump under the knees for comfort.  Ultrasound images were obtained throughout the procedure and stored for review at a later time if needed.      Procedure, treatment alternatives, risks and benefits explained, specific risks discussed. Consent was given by the patient and parent. Immediately prior to procedure a time out was called to verify the correct patient, procedure, equipment, support staff and site/side marked as required. Patient was prepped and draped in the usual sterile fashion.           Assessment/Plan   Encounter Diagnoses:  Arthritis of knee, left    Orders Placed This Encounter    L Inj/Asp    Point of Care Ultrasound     Left knee arthritis status post ultrasound-guided Visco supplementation injection #3 in the series of 3 with Gelsyn3. She and her mother were educated on signs and symptoms of local reaction and infection and she should call the office if she experiences any of these.  she has been getting bad motion sickness in the car even when she is driving but worse when she isn't driving. She has had this both days where she hasn't and has taken her medication. She has never taken anything like Dramamine. It does take a little bit to kick in, but she does have issues when she is moving, she does get sick right away when she is in the passenger seat and rear seat.     The venlafaxine was making her restless when she was on the 225 and is doing better with the 150 mg. She is sleeping a bit better now with that dose change.     She is taking atenolol daily and does try to not take the ativan as long as she is feeling ok.     Her headaches are good, she doesn't get them unless she isn't drinking water.   Chief Complaint   Patient presents with     Medication Management     Patient would like to review her current medications.      Anxiety     Follow up on anxiety.          Patient Active Problem List   Diagnosis     Hereditary Hemolytic Spherocytosis     Allergic Rhinitis     Asplenia     Cocaine use     Elevated liver enzymes     Panic disorder     Anxiety     Thrombocytosis     ZION (generalized anxiety disorder)     Nonintractable migraine       Current Outpatient Prescriptions   Medication Sig Dispense Refill     atenolol (TENORMIN) 25 MG tablet Take 1 tab daily for anxiety, may repeat if needed once 90 tablet 1     LORazepam (ATIVAN) 1 MG tablet Take 1 tablet (1 mg total) by mouth 2 (two) times a day as needed for anxiety. 60 tablet 0     norgestimate-ethinyl estradiol (ORTHO TRI-CYCLEN LO, 28,) 0.18/0.215/0.25 mg-25 mcg Tab tablet Take 1 tablet by mouth daily. 84 tablet 3     venlafaxine (EFFEXOR-XR) 150 MG 24 hr capsule Take 1 capsule (150 mg total) by mouth daily. 90 capsule 1     SUMAtriptan (IMITREX) 25 MG tablet Take 1 tablet (25 mg total) by mouth every 2 (two) hours as needed for migraine (up to 3 pills in 24 hrs). 12 tablet 0     No current facility-administered medications for this  She should take it easy on the knee for the next 24-48 hours, rest, ice, and anti-inflammatories. After that, she can return to her normal activity.  At this point she has completed the series and is doing well.  She should continue with her home exercise program and current medication regimen.  She will follow-up as needed.  All of hers and her mother's questions were answered and they agree with the treatment plan.     ** Please excuse any errors in grammar or translation related to this dictation. Voice recognition software was utilized to prepare this document. **    visit.        History   Smoking Status     Former Smoker     Packs/day: 0.50     Years: 3.00     Quit date: 4/27/2017   Smokeless Tobacco     Never Used           OBJECTIVE:        No results found for this or any previous visit (from the past 240 hour(s)).    Vitals:    01/24/18 0744   BP: 118/70   Patient Site: Left Arm   Patient Position: Sitting   Cuff Size: Adult Regular   Pulse: 78   SpO2: 98%   Weight: 151 lb 4.8 oz (68.6 kg)     Weight: 151 lb 4.8 oz (68.6 kg)        Physical Exam:  GENERAL APPEARANCE: A&A, NAD, well hydrated, well nourished  SKIN:  Normal skin turgor, no lesions/rashes   Psych: Her affect is stable, she makes normal eye contact, her thought process and speech pattern are normal, she is not tearful today.  Elevated ZION 7 and PHQ 9 today  NEURO: no gross deficits

## 2024-04-04 ENCOUNTER — HOSPITAL ENCOUNTER (EMERGENCY)
Facility: CLINIC | Age: 29
Discharge: HOME | End: 2024-04-04
Admitting: PHYSICIAN ASSISTANT

## 2024-04-04 VITALS
WEIGHT: 175 LBS | HEIGHT: 63 IN | RESPIRATION RATE: 18 BRPM | TEMPERATURE: 100.4 F | BODY MASS INDEX: 31.01 KG/M2 | DIASTOLIC BLOOD PRESSURE: 70 MMHG | HEART RATE: 110 BPM | OXYGEN SATURATION: 97 % | SYSTOLIC BLOOD PRESSURE: 129 MMHG

## 2024-04-04 DIAGNOSIS — J10.1 INFLUENZA B: ICD-10-CM

## 2024-04-04 LAB
ANION GAP SERPL CALCULATED.3IONS-SCNC: 8 MMOL/L (ref 7–15)
BASOPHILS # BLD AUTO: 0 10E3/UL (ref 0–0.2)
BASOPHILS NFR BLD AUTO: 1 %
BUN SERPL-MCNC: 6.9 MG/DL (ref 6–20)
CALCIUM SERPL-MCNC: 8.5 MG/DL (ref 8.6–10)
CHLORIDE SERPL-SCNC: 102 MMOL/L (ref 98–107)
CREAT SERPL-MCNC: 0.57 MG/DL (ref 0.51–0.95)
DEPRECATED HCO3 PLAS-SCNC: 25 MMOL/L (ref 22–29)
EGFRCR SERPLBLD CKD-EPI 2021: >90 ML/MIN/1.73M2
EOSINOPHIL # BLD AUTO: 0.1 10E3/UL (ref 0–0.7)
EOSINOPHIL NFR BLD AUTO: 1 %
ERYTHROCYTE [DISTWIDTH] IN BLOOD BY AUTOMATED COUNT: 13.5 % (ref 10–15)
FLUAV RNA SPEC QL NAA+PROBE: NEGATIVE
FLUBV RNA RESP QL NAA+PROBE: POSITIVE
GLUCOSE SERPL-MCNC: 101 MG/DL (ref 70–99)
HCT VFR BLD AUTO: 34.3 % (ref 35–47)
HGB BLD-MCNC: 12.6 G/DL (ref 11.7–15.7)
IMM GRANULOCYTES # BLD: 0.1 10E3/UL
IMM GRANULOCYTES NFR BLD: 1 %
LYMPHOCYTES # BLD AUTO: 0.9 10E3/UL (ref 0.8–5.3)
LYMPHOCYTES NFR BLD AUTO: 11 %
MCH RBC QN AUTO: 31.4 PG (ref 26.5–33)
MCHC RBC AUTO-ENTMCNC: 36.7 G/DL (ref 31.5–36.5)
MCV RBC AUTO: 86 FL (ref 78–100)
MONOCYTES # BLD AUTO: 0.7 10E3/UL (ref 0–1.3)
MONOCYTES NFR BLD AUTO: 9 %
NEUTROPHILS # BLD AUTO: 6.6 10E3/UL (ref 1.6–8.3)
NEUTROPHILS NFR BLD AUTO: 79 %
NRBC # BLD AUTO: 0 10E3/UL
NRBC BLD AUTO-RTO: 0 /100
PLATELET # BLD AUTO: 474 10E3/UL (ref 150–450)
POTASSIUM SERPL-SCNC: 3.8 MMOL/L (ref 3.4–5.3)
RBC # BLD AUTO: 4.01 10E6/UL (ref 3.8–5.2)
RSV RNA SPEC NAA+PROBE: NEGATIVE
SARS-COV-2 RNA RESP QL NAA+PROBE: NEGATIVE
SODIUM SERPL-SCNC: 135 MMOL/L (ref 135–145)
WBC # BLD AUTO: 8.4 10E3/UL (ref 4–11)

## 2024-04-04 PROCEDURE — 258N000003 HC RX IP 258 OP 636: Performed by: EMERGENCY MEDICINE

## 2024-04-04 PROCEDURE — 87637 SARSCOV2&INF A&B&RSV AMP PRB: CPT | Performed by: EMERGENCY MEDICINE

## 2024-04-04 PROCEDURE — 250N000013 HC RX MED GY IP 250 OP 250 PS 637: Performed by: EMERGENCY MEDICINE

## 2024-04-04 PROCEDURE — 85025 COMPLETE CBC W/AUTO DIFF WBC: CPT | Performed by: EMERGENCY MEDICINE

## 2024-04-04 PROCEDURE — 36415 COLL VENOUS BLD VENIPUNCTURE: CPT | Performed by: EMERGENCY MEDICINE

## 2024-04-04 PROCEDURE — 96374 THER/PROPH/DIAG INJ IV PUSH: CPT

## 2024-04-04 PROCEDURE — 96361 HYDRATE IV INFUSION ADD-ON: CPT

## 2024-04-04 PROCEDURE — 80048 BASIC METABOLIC PNL TOTAL CA: CPT | Performed by: EMERGENCY MEDICINE

## 2024-04-04 PROCEDURE — 250N000011 HC RX IP 250 OP 636: Performed by: EMERGENCY MEDICINE

## 2024-04-04 PROCEDURE — 99284 EMERGENCY DEPT VISIT MOD MDM: CPT | Mod: 25

## 2024-04-04 RX ORDER — ONDANSETRON 4 MG/1
4 TABLET, ORALLY DISINTEGRATING ORAL EVERY 8 HOURS PRN
Qty: 15 TABLET | Refills: 0 | Status: SHIPPED | OUTPATIENT
Start: 2024-04-04 | End: 2024-04-09

## 2024-04-04 RX ORDER — ACETAMINOPHEN 325 MG/1
975 TABLET ORAL ONCE
Status: COMPLETED | OUTPATIENT
Start: 2024-04-04 | End: 2024-04-04

## 2024-04-04 RX ORDER — ONDANSETRON 2 MG/ML
4 INJECTION INTRAMUSCULAR; INTRAVENOUS ONCE
Status: COMPLETED | OUTPATIENT
Start: 2024-04-04 | End: 2024-04-04

## 2024-04-04 RX ADMIN — ONDANSETRON 4 MG: 2 INJECTION INTRAMUSCULAR; INTRAVENOUS at 21:34

## 2024-04-04 RX ADMIN — SODIUM CHLORIDE 1000 ML: 9 INJECTION, SOLUTION INTRAVENOUS at 21:34

## 2024-04-04 RX ADMIN — ACETAMINOPHEN 975 MG: 325 TABLET ORAL at 23:05

## 2024-04-04 ASSESSMENT — ACTIVITIES OF DAILY LIVING (ADL): ADLS_ACUITY_SCORE: 33

## 2024-04-04 ASSESSMENT — COLUMBIA-SUICIDE SEVERITY RATING SCALE - C-SSRS
6. HAVE YOU EVER DONE ANYTHING, STARTED TO DO ANYTHING, OR PREPARED TO DO ANYTHING TO END YOUR LIFE?: NO
2. HAVE YOU ACTUALLY HAD ANY THOUGHTS OF KILLING YOURSELF IN THE PAST MONTH?: NO
1. IN THE PAST MONTH, HAVE YOU WISHED YOU WERE DEAD OR WISHED YOU COULD GO TO SLEEP AND NOT WAKE UP?: NO

## 2024-04-04 NOTE — Clinical Note
Tonya Thayer was seen and treated in our emergency department on 4/4/2024.  She may return to work on 04/08/2024.  Please excuse the above-mentioned patient till the above-mentioned date for work, they were seen here in the emergency department, and cannot return to work until the above-mentioned date     If you have any questions or concerns, please don't hesitate to call.      Gabriele Colon PA-C

## 2024-04-05 NOTE — ED TRIAGE NOTES
Pt reports NVD and body aches since Monday. Significantly worse the last two days, has not been able to hold down food or fluids. Had sycopal episode while vomiting today.      Triage Assessment (Adult)       Row Name 04/04/24 2117          Triage Assessment    Airway WDL WDL        Respiratory WDL    Respiratory WDL WDL        Skin Circulation/Temperature WDL    Skin Circulation/Temperature WDL WDL        Cardiac WDL    Cardiac WDL X;rhythm     Pulse Rate & Regularity tachycardic        Peripheral/Neurovascular WDL    Peripheral Neurovascular WDL WDL        Cognitive/Neuro/Behavioral WDL    Cognitive/Neuro/Behavioral WDL WDL

## 2024-04-05 NOTE — DISCHARGE INSTRUCTIONS
You were seen here in the emergency department for evaluation of vomiting.  Your influenza testing here is positive.  This is likely causing your symptoms.  Try to hydrate at home is much as possible.  Use ibuprofen or Tylenol at home for fevers.    For pain or fever you may use:  -Tylenol 650 mg every 6 hours.  Max 4000 mg in 24 hours  Do not use thismedication with alcohol as it can cause liver problems.  -Ibuprofen 600 mg every 6 hours.  Max 3500 mg in 24 hours  Do not take this medication if you have a history of a GI bleed or have kidney problems.  You may use both of these medications at the same time or you can alternate them every 3 hours.  For example, Tylenol at 6 AM, ibuprofen at 9 AM, Tylenol at noon, etc.

## 2024-04-05 NOTE — ED PROVIDER NOTES
EMERGENCY DEPARTMENT ENCOUNTER      NAME: Tonya Thayer  AGE: 28 year old female  YOB: 1995  MRN: [id number]  EVALUATION DATE & TIME: No admission date for patient encounter.    PCP: Michelle Medina    ED PROVIDER: Gabriele Colon PA-C      Chief Complaint   Patient presents with    Vomiting         FINAL IMPRESSION:  1. Influenza B          ED COURSE & MEDICAL DECISION MAKING:    Pertinent Labs & Imaging studies reviewed. (See chart for details)  10:25 PM I met the patient and performed my initial interview and exam. Discussed plan for discharge.     28 year old female presents to the Emergency Department for evaluation of fever, vomiting, body aches.     ED Course as of 04/04/24 2244   Thu Apr 04, 2024 2241 Patient is a 28-year-old female who presents emergency department for evaluation of nausea, vomiting, diarrhea, past medical history of severe cytosis, as well as asplenia, has had ongoing symptoms since Monday.  Reports nausea and vomiting diarrhea, has been worse over the last couple of days.  On arrival here, borderline febrile, tachycardic.  On examination, she is not having any abdominal pain, rebound or guarding.  Laboratory studies here do not show acute abnormalities.  She is positive for influenza, which I suspect is likely causing her symptoms.  Was given a little bit of Zofran here in the emergency department which minimally helped with her symptoms, as well as some fluids.  She has been doing over-the-counter treatments.  On exam, she does not have any significant abdominal pain, rebound or guarding.  No CVA tenderness.  No urinary symptoms.  I suspect her symptoms are likely secondary to influenza.  Recommend supportive care at home, unfortunately she is outside the window for Tamiflu.  Patient will be discharged here, will send home with a prescription for Zofran.  She is agreeable with this plan.  Patient be discharged at this time, recommend outpatient follow-up  with her primary care doctor.  She expressed understanding, discussed return precautions, plan for discharge.   2244 Her presentation here seems most consistent with dehydration, influenza, I did consider further workup for her tachycardia, however suspect is likely secondary to ongoing infection, is not representative of acute PE, or cardiac abnormality.  She is otherwise low risk for acute embolus, or cardiac etiology.       Medical Decision Making  Obtained supplemental history:Supplemental history obtained?: No  Reviewed external records: External records reviewed?: Outpatient Record: Outpatient office visit on 01/23/2024  Care impacted by chronic illness:N/A  Care significantly affected by social determinants of health:N/A  Did you consider but not order tests?: Work up considered but not performed and documented in chart, if applicable  Did you interpret images independently?: Independent interpretation of ECG and images noted in documentation, when applicable.  Consultation discussion with other provider:Did you involve another provider (consultant, , pharmacy, etc.)?: No  Discharge. I prescribed additional prescription strength medication(s) as charted. N/A.         At the conclusion of the encounter I discussed the results of all of the tests and the disposition. The questions were answered. The patient or family acknowledged understanding and was agreeable with the care plan.       0 minutes of critical care time     MEDICATIONS GIVEN IN THE EMERGENCY:  Medications   acetaminophen (TYLENOL) tablet 975 mg (has no administration in time range)   sodium chloride 0.9% BOLUS 1,000 mL (1,000 mLs Intravenous $New Bag 4/4/24 2134)   ondansetron (ZOFRAN) injection 4 mg (4 mg Intravenous $Given 4/4/24 2134)       NEW PRESCRIPTIONS STARTED AT TODAY'S ER VISIT  New Prescriptions    ONDANSETRON (ZOFRAN ODT) 4 MG ODT TAB    Take 1 tablet (4 mg) by mouth every 8 hours as needed           =================================================================    HPI    Patient information was obtained from: Patient     Use of : N/A      Tonya Thayer is a 28 year old female with a pertinent history of tachycardia, asplenia, severe cytosis, migraines, panic disorder, thrombocytosis who presents to this ED for evaluation of generalized bodyaches, nausea vomiting and diarrhea.  Symptoms have been ongoing since Monday.  Reports significantly worse over the last 2 days.  Has not been able hold down food or fluids.  Has syncopal episode earlier today while vomiting.  On arrival here, borderline febrile, tachycardic.    Denies significant abdominal pain, rebound or guarding.  No urinary symptoms.  No blood in stool.    PAST MEDICAL HISTORY:  Past Medical History:   Diagnosis Date    Anxiety     Asplenia     Cocaine use     Depression     Fever and chills     Hereditary spherocytosis (H24)     Leukocytosis 7/18/2016    Pyelonephritis 11/15/2016    Sepsis, due to unspecified organism     SIRS (systemic inflammatory response syndrome) (H) 10/12/2019    Spherocytosis (H24)     UTI (urinary tract infection)        PAST SURGICAL HISTORY:  Past Surgical History:   Procedure Laterality Date    SPLENECTOMY      ZZC LAP,CHOLECYSTECTOMY/EXPLORE      Description: Cholecystectomy Laparoscopic;  Recorded: 10/07/2009;    ZZC REMOVAL SPLEEN, TOTAL      Description: Splenectomy;  Recorded: 12/17/2012;  Comments: 11/12 secondary to hereditary spherocytosis. Recieved pneumovax, HIB and meningococcal vaccine prior to splenectomy. Needs pneumovax every 5 yrs for 2 doses and is on erythromycin prophylaxis. If gets fever >100.5, needs white count, differential, blood culture and strong consideration of rocephin 50-75 mg/kg. Should have CXR for any pul*    ZZC REMOVAL SPLEEN, TOTAL      Description: Splenectomy;  Recorded: 09/20/2014;  Comments: 11/2012 secondary to hereditary spherocytosis. Recieved pneumovax, HIB  and meningococcal vaccine prior to splenectomy. Needs pneumovax every 5 yrs for 2 doses. If gets fever >100.5, needs white count, differential, blood culture and strong consideration of rocephin 50-75 mg/kg. Should have CXR for any pulmonary symptoms. All above recomm*           CURRENT MEDICATIONS:    ondansetron (ZOFRAN ODT) 4 MG ODT tab  escitalopram (LEXAPRO) 20 MG tablet  lidocaine, viscous, (XYLOCAINE) 2 % solution  LORazepam (ATIVAN) 1 MG tablet  ondansetron (ZOFRAN ODT) 4 MG ODT tab         ALLERGIES:  Allergies   Allergen Reactions    Amoxicillin     Levofloxacin Shortness Of Breath     20 minutes after levofloxacin infusion patient felt short of breath and warm.  She had some obvious facial flushing    Prochlorperazine Anxiety, Other (See Comments), Palpitations and Shortness Of Breath    Buspirone Other (See Comments)     Troubles sleeping, restless feeling.     Penicillins     Tramadol Nausea and Vomiting       FAMILY HISTORY:  Family History   Problem Relation Age of Onset    Chronic Obstructive Pulmonary Disease Mother          age 50    Hereditary Spherocytosis Mother     Diabetes Type 2  Father     Clotting Disorder Brother         Hereditary Spherocytosis    Hereditary Spherocytosis Brother        SOCIAL HISTORY:   Social History     Socioeconomic History    Marital status: Single   Tobacco Use    Smoking status: Former     Years: 3     Types: Cigarettes     Quit date: 7/15/2020     Years since quitting: 3.7     Passive exposure: Never    Smokeless tobacco: Never    Tobacco comments:     2 to 3 cigs a day.   Vaping Use    Vaping Use: Some days   Substance and Sexual Activity    Alcohol use: Not Currently     Comment: Alcoholic Drinks/day: 1 beer every few months     Drug use: Not Currently    Sexual activity: Not Currently     Partners: Male   Social History Narrative    Lives at home with mother and brother.     Social Determinants of Health     Financial Resource Strain: Low Risk  (2024)  "   Financial Resource Strain     Within the past 12 months, have you or your family members you live with been unable to get utilities (heat, electricity) when it was really needed?: No   Food Insecurity: Low Risk  (1/23/2024)    Food Insecurity     Within the past 12 months, did you worry that your food would run out before you got money to buy more?: No     Within the past 12 months, did the food you bought just not last and you didn t have money to get more?: No   Transportation Needs: Low Risk  (1/23/2024)    Transportation Needs     Within the past 12 months, has lack of transportation kept you from medical appointments, getting your medicines, non-medical meetings or appointments, work, or from getting things that you need?: No   Interpersonal Safety: Low Risk  (1/23/2024)    Interpersonal Safety     Do you feel physically and emotionally safe where you currently live?: Yes     Within the past 12 months, have you been hit, slapped, kicked or otherwise physically hurt by someone?: No     Within the past 12 months, have you been humiliated or emotionally abused in other ways by your partner or ex-partner?: No   Housing Stability: Low Risk  (1/23/2024)    Housing Stability     Do you have housing? : Yes     Are you worried about losing your housing?: No       VITALS:  /75   Pulse (!) 125   Temp 100.4  F (38  C) (Temporal)   Resp 18   Ht 1.6 m (5' 3\")   Wt 79.4 kg (175 lb)   LMP 03/07/2024 (Approximate)   SpO2 97%   BMI 31.00 kg/m      PHYSICAL EXAM    Physical Exam  Vitals and nursing note reviewed.   Constitutional:       General: She is not in acute distress.     Appearance: Normal appearance. She is normal weight. She is not toxic-appearing or diaphoretic.   HENT:      Head: Normocephalic.      Right Ear: External ear normal.      Left Ear: External ear normal.   Cardiovascular:      Rate and Rhythm: Regular rhythm. Tachycardia present.      Heart sounds: Normal heart sounds. No murmur heard.     " No friction rub. No gallop.   Pulmonary:      Effort: Pulmonary effort is normal. No respiratory distress.      Breath sounds: No wheezing.   Abdominal:      General: Abdomen is flat. Bowel sounds are normal. There is no distension.      Palpations: Abdomen is soft.      Tenderness: There is no abdominal tenderness. There is no right CVA tenderness, left CVA tenderness, guarding or rebound.   Musculoskeletal:      Cervical back: No tenderness.   Skin:     General: Skin is warm.      Findings: No erythema or rash.   Neurological:      Mental Status: She is alert. Mental status is at baseline.      Sensory: No sensory deficit.      Motor: No weakness.           LAB:  All pertinent labs reviewed and interpreted.  Labs Ordered and Resulted from Time of ED Arrival to Time of ED Departure   BASIC METABOLIC PANEL - Abnormal       Result Value    Sodium 135      Potassium 3.8      Chloride 102      Carbon Dioxide (CO2) 25      Anion Gap 8      Urea Nitrogen 6.9      Creatinine 0.57      GFR Estimate >90      Calcium 8.5 (*)     Glucose 101 (*)    INFLUENZA A/B, RSV, & SARS-COV2 PCR - Abnormal    Influenza A PCR Negative      Influenza B PCR Positive (*)     RSV PCR Negative      SARS CoV2 PCR Negative     CBC WITH PLATELETS AND DIFFERENTIAL - Abnormal    WBC Count 8.4      RBC Count 4.01      Hemoglobin 12.6      Hematocrit 34.3 (*)     MCV 86      MCH 31.4      MCHC 36.7 (*)     RDW 13.5      Platelet Count 474 (*)     % Neutrophils 79      % Lymphocytes 11      % Monocytes 9      % Eosinophils 1      % Basophils 1      % Immature Granulocytes 1      NRBCs per 100 WBC 0      Absolute Neutrophils 6.6      Absolute Lymphocytes 0.9      Absolute Monocytes 0.7      Absolute Eosinophils 0.1      Absolute Basophils 0.0      Absolute Immature Granulocytes 0.1      Absolute NRBCs 0.0         RADIOLOGY:  Reviewed all pertinent imaging. Please see official radiology report.  No orders to display       PROCEDURES:   None.     Gabriele  JEREMY Colon  Emergency Medicine  [facility] EMERGENCY ROOM  [address] [facility] [address] [facility] [phone]  Dept: [phone]       Gabriele Colon PA-C  04/04/24 2246

## 2024-04-08 ENCOUNTER — HOSPITAL ENCOUNTER (EMERGENCY)
Facility: CLINIC | Age: 29
Discharge: HOME | End: 2024-04-08
Attending: EMERGENCY MEDICINE | Admitting: EMERGENCY MEDICINE

## 2024-04-08 VITALS
RESPIRATION RATE: 20 BRPM | BODY MASS INDEX: 31.89 KG/M2 | OXYGEN SATURATION: 99 % | DIASTOLIC BLOOD PRESSURE: 51 MMHG | WEIGHT: 180 LBS | HEART RATE: 72 BPM | TEMPERATURE: 98.4 F | SYSTOLIC BLOOD PRESSURE: 99 MMHG | HEIGHT: 63 IN

## 2024-04-08 DIAGNOSIS — O21.0 HYPEREMESIS GRAVIDARUM: ICD-10-CM

## 2024-04-08 DIAGNOSIS — J10.1 INFLUENZA B: ICD-10-CM

## 2024-04-08 LAB
ALBUMIN SERPL BCG-MCNC: 4 G/DL (ref 3.5–5.2)
ALBUMIN UR-MCNC: NEGATIVE MG/DL
ALP SERPL-CCNC: 46 U/L (ref 40–150)
ALT SERPL W P-5'-P-CCNC: 15 U/L (ref 0–50)
ANION GAP SERPL CALCULATED.3IONS-SCNC: 10 MMOL/L (ref 7–15)
APPEARANCE UR: CLEAR
AST SERPL W P-5'-P-CCNC: 28 U/L (ref 0–45)
BACTERIA #/AREA URNS HPF: ABNORMAL /HPF
BASOPHILS # BLD AUTO: 0 10E3/UL (ref 0–0.2)
BASOPHILS NFR BLD AUTO: 0 %
BILIRUB SERPL-MCNC: 0.7 MG/DL
BILIRUB UR QL STRIP: NEGATIVE
BUN SERPL-MCNC: 7.1 MG/DL (ref 6–20)
CALCIUM SERPL-MCNC: 8.3 MG/DL (ref 8.6–10)
CHLORIDE SERPL-SCNC: 104 MMOL/L (ref 98–107)
COLOR UR AUTO: ABNORMAL
CREAT SERPL-MCNC: 0.56 MG/DL (ref 0.51–0.95)
DEPRECATED HCO3 PLAS-SCNC: 23 MMOL/L (ref 22–29)
EGFRCR SERPLBLD CKD-EPI 2021: >90 ML/MIN/1.73M2
EOSINOPHIL # BLD AUTO: 0.1 10E3/UL (ref 0–0.7)
EOSINOPHIL NFR BLD AUTO: 1 %
ERYTHROCYTE [DISTWIDTH] IN BLOOD BY AUTOMATED COUNT: 13.7 % (ref 10–15)
GLUCOSE SERPL-MCNC: 94 MG/DL (ref 70–99)
GLUCOSE UR STRIP-MCNC: NEGATIVE MG/DL
HCG INTACT+B SERPL-ACNC: ABNORMAL MIU/ML
HCT VFR BLD AUTO: 35.1 % (ref 35–47)
HGB BLD-MCNC: 12.4 G/DL (ref 11.7–15.7)
HGB UR QL STRIP: NEGATIVE
IMM GRANULOCYTES # BLD: 0 10E3/UL
IMM GRANULOCYTES NFR BLD: 0 %
KETONES UR STRIP-MCNC: NEGATIVE MG/DL
LEUKOCYTE ESTERASE UR QL STRIP: NEGATIVE
LYMPHOCYTES # BLD AUTO: 2.1 10E3/UL (ref 0.8–5.3)
LYMPHOCYTES NFR BLD AUTO: 30 %
MAGNESIUM SERPL-MCNC: 2.2 MG/DL (ref 1.7–2.3)
MCH RBC QN AUTO: 30.9 PG (ref 26.5–33)
MCHC RBC AUTO-ENTMCNC: 35.3 G/DL (ref 31.5–36.5)
MCV RBC AUTO: 88 FL (ref 78–100)
MONOCYTES # BLD AUTO: 0.5 10E3/UL (ref 0–1.3)
MONOCYTES NFR BLD AUTO: 7 %
MUCOUS THREADS #/AREA URNS LPF: PRESENT /LPF
NEUTROPHILS # BLD AUTO: 4.3 10E3/UL (ref 1.6–8.3)
NEUTROPHILS NFR BLD AUTO: 61 %
NITRATE UR QL: NEGATIVE
NRBC # BLD AUTO: 0 10E3/UL
NRBC BLD AUTO-RTO: 0 /100
PH UR STRIP: 6.5 [PH] (ref 5–7)
PLATELET # BLD AUTO: 457 10E3/UL (ref 150–450)
POTASSIUM SERPL-SCNC: 4 MMOL/L (ref 3.4–5.3)
PROT SERPL-MCNC: 7.4 G/DL (ref 6.4–8.3)
RBC # BLD AUTO: 4.01 10E6/UL (ref 3.8–5.2)
RBC URINE: <1 /HPF
SODIUM SERPL-SCNC: 137 MMOL/L (ref 135–145)
SP GR UR STRIP: 1.01 (ref 1–1.03)
SQUAMOUS EPITHELIAL: 1 /HPF
UROBILINOGEN UR STRIP-MCNC: <2 MG/DL
WBC # BLD AUTO: 7 10E3/UL (ref 4–11)
WBC URINE: 1 /HPF

## 2024-04-08 PROCEDURE — 36415 COLL VENOUS BLD VENIPUNCTURE: CPT | Performed by: STUDENT IN AN ORGANIZED HEALTH CARE EDUCATION/TRAINING PROGRAM

## 2024-04-08 PROCEDURE — 250N000011 HC RX IP 250 OP 636: Performed by: STUDENT IN AN ORGANIZED HEALTH CARE EDUCATION/TRAINING PROGRAM

## 2024-04-08 PROCEDURE — 83735 ASSAY OF MAGNESIUM: CPT | Performed by: STUDENT IN AN ORGANIZED HEALTH CARE EDUCATION/TRAINING PROGRAM

## 2024-04-08 PROCEDURE — 258N000003 HC RX IP 258 OP 636: Performed by: STUDENT IN AN ORGANIZED HEALTH CARE EDUCATION/TRAINING PROGRAM

## 2024-04-08 PROCEDURE — 81001 URINALYSIS AUTO W/SCOPE: CPT | Performed by: STUDENT IN AN ORGANIZED HEALTH CARE EDUCATION/TRAINING PROGRAM

## 2024-04-08 PROCEDURE — 96374 THER/PROPH/DIAG INJ IV PUSH: CPT

## 2024-04-08 PROCEDURE — 96375 TX/PRO/DX INJ NEW DRUG ADDON: CPT

## 2024-04-08 PROCEDURE — 250N000013 HC RX MED GY IP 250 OP 250 PS 637: Performed by: STUDENT IN AN ORGANIZED HEALTH CARE EDUCATION/TRAINING PROGRAM

## 2024-04-08 PROCEDURE — 85025 COMPLETE CBC W/AUTO DIFF WBC: CPT | Performed by: STUDENT IN AN ORGANIZED HEALTH CARE EDUCATION/TRAINING PROGRAM

## 2024-04-08 PROCEDURE — 80053 COMPREHEN METABOLIC PANEL: CPT | Performed by: STUDENT IN AN ORGANIZED HEALTH CARE EDUCATION/TRAINING PROGRAM

## 2024-04-08 PROCEDURE — 84702 CHORIONIC GONADOTROPIN TEST: CPT | Performed by: STUDENT IN AN ORGANIZED HEALTH CARE EDUCATION/TRAINING PROGRAM

## 2024-04-08 PROCEDURE — 99284 EMERGENCY DEPT VISIT MOD MDM: CPT | Mod: 25

## 2024-04-08 PROCEDURE — 96361 HYDRATE IV INFUSION ADD-ON: CPT

## 2024-04-08 RX ORDER — METOCLOPRAMIDE HYDROCHLORIDE 5 MG/ML
10 INJECTION INTRAMUSCULAR; INTRAVENOUS EVERY 6 HOURS
Status: DISCONTINUED | OUTPATIENT
Start: 2024-04-08 | End: 2024-04-08

## 2024-04-08 RX ORDER — PYRIDOXINE HCL (VITAMIN B6) 25 MG
25 TABLET ORAL DAILY
Status: DISCONTINUED | OUTPATIENT
Start: 2024-04-08 | End: 2024-04-08

## 2024-04-08 RX ORDER — ONDANSETRON 2 MG/ML
4 INJECTION INTRAMUSCULAR; INTRAVENOUS ONCE
Status: COMPLETED | OUTPATIENT
Start: 2024-04-08 | End: 2024-04-08

## 2024-04-08 RX ADMIN — DOXYLAMINE SUCCINATE 12.5 MG: 25 TABLET ORAL at 15:43

## 2024-04-08 RX ADMIN — ONDANSETRON 4 MG: 2 INJECTION INTRAMUSCULAR; INTRAVENOUS at 17:21

## 2024-04-08 RX ADMIN — PYRIDOXINE HYDROCHLORIDE 50 MG: 100 INJECTION, SOLUTION INTRAMUSCULAR; INTRAVENOUS at 15:52

## 2024-04-08 RX ADMIN — SODIUM CHLORIDE 1000 ML: 9 INJECTION, SOLUTION INTRAVENOUS at 14:48

## 2024-04-08 ASSESSMENT — ACTIVITIES OF DAILY LIVING (ADL)
ADLS_ACUITY_SCORE: 35
ADLS_ACUITY_SCORE: 35

## 2024-04-08 ASSESSMENT — COLUMBIA-SUICIDE SEVERITY RATING SCALE - C-SSRS
1. IN THE PAST MONTH, HAVE YOU WISHED YOU WERE DEAD OR WISHED YOU COULD GO TO SLEEP AND NOT WAKE UP?: NO
6. HAVE YOU EVER DONE ANYTHING, STARTED TO DO ANYTHING, OR PREPARED TO DO ANYTHING TO END YOUR LIFE?: NO
2. HAVE YOU ACTUALLY HAD ANY THOUGHTS OF KILLING YOURSELF IN THE PAST MONTH?: NO

## 2024-04-08 NOTE — ED PROVIDER NOTES
I am seeing this patient along with Shira Gomes PA-C. I had a face to face encounter with this patient seen by the Advanced Practice Provider (KATRIN).  I have seen, examined, and discussed the patient with the KATRIN and agree with their assessment and plan of management. I personally saw the patient and performed a substantive portion of the visit including all aspects of the medical decision making.    HPI:  Tonya Thayer is a 28 year old female with history of ZION, MDD, panic disorder who presents with vomiting for the past 5-6 days. Patient was diagnosed with influenza B on 4/4/2024 when she went to the ED and found out that she was pregnant that night. Her LMP was 3/2/2024. She has been using Zofran without any relief. She denies vaginal bleeding, vaginal discharge, urinary symptoms, abdominal pain, chest pain, and shortness of breath. There were no other concerns/complaints at this time.    Physical Exam: Nontoxic, no acute distress      LABS  Pertinent lab results reviewed in chart.  Labs Ordered and Resulted from Time of ED Arrival to Time of ED Departure   CBC WITH PLATELETS AND DIFFERENTIAL - Abnormal       Result Value    WBC Count 7.0      RBC Count 4.01      Hemoglobin 12.4      Hematocrit 35.1      MCV 88      MCH 30.9      MCHC 35.3      RDW 13.7      Platelet Count 457 (*)     % Neutrophils 61      % Lymphocytes 30      % Monocytes 7      % Eosinophils 1      % Basophils 0      % Immature Granulocytes 0      NRBCs per 100 WBC 0      Absolute Neutrophils 4.3      Absolute Lymphocytes 2.1      Absolute Monocytes 0.5      Absolute Eosinophils 0.1      Absolute Basophils 0.0      Absolute Immature Granulocytes 0.0      Absolute NRBCs 0.0     ROUTINE UA WITH MICROSCOPIC REFLEX TO CULTURE   MAGNESIUM   COMPREHENSIVE METABOLIC PANEL   HCG QUANTITATIVE PREGNANCY       EKG  None    RADIOLOGY  No orders to display       PROCEDURES   None    ED COURSE & MEDICAL DECISION MAKING    2:38 PM Patient was staffed  with me by Shira Gomes PA-C.    Pertinent Labs and Imagaing reviewed (see chart for details)    28 year old female with ongoing nausea and vomiting.  Patient was diagnosed with influenza and then was discovered to be newly pregnant on a home pregnancy test.  Likely this combination has exacerbated her clinical symptoms and it is hard to control.  She does have an allergy to Compazine and Reglan, so she has had to use some outpatient Zofran.  We added fluids, pyridoxine, and oral Unisom here with improvement and she is now tolerating some liquids.  Her labs remain reassuring.  She feels comfortable with discharge.    At the conclusion of the encounter I discussed  the results of all of the tests and the disposition.   The questions were answered.  The patient or family acknowledged understanding and was agreeable with the care plan.       FINAL IMPRESSION      1. Influenza B    2. Hyperemesis gravidarum            CRITICAL CARE  0 Minutes      I, Yue Weiss, am serving as a scribe to document services personally performed by Rachele Funk MD, based on my observations and the provider's statements to me.  I, Rachele Funk MD, attest that Yue Weiss is acting in a scribe capacity, has observed my performance of the services and has documented them in accordance with my direction.        Rachele Funk MD  04/08/24 4340

## 2024-04-08 NOTE — ED TRIAGE NOTES
Pt here with vomiting for the last 5-6 days. Was seen here on 24 and found she has influenza B and then when she got home she checked a pregnancy test and found out she is pregnant. Pt states LMP was . Not sure if it was spotting or what. . States she has Zofran at home but its not helping. Last dose noon. Has back pain with vomiting.

## 2024-04-08 NOTE — PROGRESS NOTES
Pharmacist Admission Medication History    Admission medication history is complete. The information provided in this note is only as accurate as the sources available at the time of the update.    Information Source(s): Patient, Patient's pharmacy, and Facility ([facility]/) medication list/MAR via in-person and phone - SureScripts show last fill dates of 5/9/23 - called [facility], have more recent fill dates Nov 2023-Jan 2024.     Pertinent Information: none    Changes made to PTA medication list:  Added: None  Deleted: viscous lidocaine, duplicate ondansetron  Changed: None    Allergies reviewed with patient and updates made in EHR: yes, added metoclopramide per patient and added reaction of hives to amoxicillin and hives/itching/rash to penicillin per [facility] records.     Medication History Completed By: Gricel Hill RPH 4/8/2024 3:48 PM    PTA Med List   Medication Sig Note Last Dose    escitalopram (LEXAPRO) 20 MG tablet Take 1 tablet (20 mg) by mouth daily 4/8/2024: Filled 90 day supply Nov 2023 - [facility]. 4/8/2024 at am    LORazepam (ATIVAN) 1 MG tablet Take 1-2 tablets (1-2 mg) by mouth 2 times daily as needed for anxiety 4/8/2024: Filled #30 tabs on 1/26/24 - [facility]. Unknown    ondansetron (ZOFRAN ODT) 4 MG ODT tab Take 1 tablet (4 mg) by mouth every 8 hours as needed 4/8/2024: Has refill ready for  4/8/24 - [facility]. 4/8/2024 at noon

## 2024-04-08 NOTE — ED PROVIDER NOTES
"Emergency Department Encounter   NAME: Tonya Thayer ; AGE: 28 year old female ; YOB: 1995 ; MRN: [id number] ; PCP: Michelle Medina   ED PROVIDER: Shira Gomes PA-C    Evaluation Date & Time:   No admission date for patient encounter.    CHIEF COMPLAINT:  Vomiting        Impression and Plan   FINAL IMPRESSION:    ICD-10-CM    1. Influenza B  J10.1       2. Hyperemesis gravidarum  O21.0           MDM:  Tonya Thayer is a 28 year old  female with PMH of prematurity 3 acidosis, migraine headaches, MDD, panic disorder, and allergic rhinitis presenting to the emergency department for evaluation of vomiting for the past 5 to 6 days. Per chart review patient was seen here in the ED on 2024 diagnosed with influenza B. Lab work at that time was relatively unremarkable. Patient states upon arriving home she decided to take a pregnancy test and this was positive.  She has been using Zofran at home for nausea with no improvement.  She has been unable to keep down any food or fluids. Her LMP was . She denies any vaginal bleeding or spotting since.  No new vaginal discharge.  Denies any urinary symptoms.  No abdominal pain, chest pain, or shortness of breath. She states history of hyperemesis gravidarum with her previous pregnancies and that \"nothing helps she has ends up in the hospital receiving fluids frequently\".  She does have a prescription for Zofran at home for hyperemesis from previous pregnancies given she has a med reaction to metoclopramide and Compazine.    Vitals reviewed and unremarkable, temp 98.4 F. /81. Pulse 79. On exam she is resting comfortably, nontoxic-appearing. Differential diagnosis includes but not limited to hyperemesis gravidarum, COVID, influenza, electrolyte abnormality, viral gastroenteritis, UTI, miscarriage. Her hCG quantitative today is 23,996. Based on her LMP she should be about 5 weeks pregnant and I think this quant is relatively " consistent with that.  She did not have any abdominal pain, cramping, or vaginal bleeding and I did not feel that an ultrasound is indicated today and I have low suspicion for miscarriage. UA without evidence of blood or UTI. CBC without leukocytosis.  Electrolytes within normal range. All other lab work unremarkable. Her symptoms are consistent with mixed picture of influenza and hyperemesis gravidarum. She was given Unisom, B6, and 1 L sodium chloride bolus with improvement overall in her nausea. She was able to drink water in the room without nausea or emesis following.  She is overall well-appearing with stable vitals.  She is requesting a dose of Zofran prior to leaving, I think this is reasonable. She does not need a refill for home. I recommended she continue to use her Zofran at home for management of her she is getting adequate fluid and calorie intake. She will call her OB/GYN's office tomorrow to schedule follow-up regarding this pregnancy.  We discussed concerning symptoms that warrant return to the emergency department, she is understanding and agreeable to this plan.      Medical Decision Making    History:  Supplemental history from: Documented in chart  External Record(s) reviewed: I personally performed chart review of ED visit from 4/4/2024    Work Up:  Chart documentation includes differential considered and any EKGs or imaging independently interpreted by provider, where specified.  In additional to work up documented, I considered the following work up: Documented in chart, if applicable.    External consultation:  Discussion of management with another provider: Dr. Funk    Complicating factors:  Care impacted by chronic illness: N/A  Care affected by social determinants of health: N/A    Disposition considerations: Discharge. No recommendations on prescription strength medication(s). See documentation for any additional details.      ED COURSE:  2:25 PM I met and introduced myself to the  patient. I gathered initial history and performed my physical exam. We discussed plan for initial workup.   2:36 PM I have staffed the patient with Dr. Funk, ED MD, who has evaluated the patient and agrees with all aspects of today's care.   5:01 PM I rechecked the patient and discussed results, discharge, follow up, and reasons to return to the ED.     At the conclusion of the encounter I discussed the results of all the tests and the disposition. The questions were answered. The patient or family acknowledged understanding and was agreeable with the care plan.          MEDICATIONS GIVEN IN THE EMERGENCY DEPARTMENT:  Medications   sodium chloride 0.9% BOLUS 1,000 mL (0 mLs Intravenous Stopped 24 1541)   doxylamine (UNISOM) half-tab 12.5 mg (12.5 mg Oral $Given 24 1543)   pyridOXINE (B-6) injection 50 mg (50 mg Intravenous $Given 24 1552)   ondansetron (ZOFRAN) injection 4 mg (4 mg Intravenous $Given 24 1721)         NEW PRESCRIPTIONS STARTED AT TODAY'S ED VISIT:  Discharge Medication List as of 2024  5:36 PM            HPI   Patient information was obtained from: patient  Use of Intrepreter: N/A     Tonya Thayer is a 28 year old  female with PMH of prematurity 3 acidosis, migraine headaches, MDD, panic disorder, and allergic rhinitis presenting to the emergency department for evaluation of vomiting for the past 5 to 6 days. Per chart review patient was seen here in the ED on 2024 diagnosed with influenza B. Lab work at that time was relatively unremarkable. Patient states upon arriving home she decided to take a pregnancy test and this was positive.  She has been using Zofran at home for nausea with no improvement.  She has been unable to keep down any food or fluids. Her LMP was . She denies any vaginal bleeding or spotting since.  No new vaginal discharge.  Denies any urinary symptoms.  No abdominal pain, chest pain, or shortness of breath. She states history of  "hyperemesis gravidarum with her previous pregnancies and that \"nothing helps she has ends up in the hospital receiving fluids frequently\".  She does have a prescription for Zofran at home for hyperemesis from previous pregnancies given she has a allergy to metoclopramide and Compazine.        Medical History     Past Medical History:   Diagnosis Date    Anxiety     Asplenia     Cocaine use     Depression     Fever and chills     Hereditary spherocytosis (H24)     Leukocytosis 2016    Pyelonephritis 11/15/2016    Sepsis, due to unspecified organism     SIRS (systemic inflammatory response syndrome) (H) 10/12/2019    Spherocytosis (H24)     UTI (urinary tract infection)        Past Surgical History:   Procedure Laterality Date    SPLENECTOMY      ZZC LAP,CHOLECYSTECTOMY/EXPLORE      Description: Cholecystectomy Laparoscopic;  Recorded: 10/07/2009;    ZZC REMOVAL SPLEEN, TOTAL      Description: Splenectomy;  Recorded: 2012;  Comments:  secondary to hereditary spherocytosis. Recieved pneumovax, HIB and meningococcal vaccine prior to splenectomy. Needs pneumovax every 5 yrs for 2 doses and is on erythromycin prophylaxis. If gets fever >100.5, needs white count, differential, blood culture and strong consideration of rocephin 50-75 mg/kg. Should have CXR for any pul*    ZZC REMOVAL SPLEEN, TOTAL      Description: Splenectomy;  Recorded: 2014;  Comments: 2012 secondary to hereditary spherocytosis. Recieved pneumovax, HIB and meningococcal vaccine prior to splenectomy. Needs pneumovax every 5 yrs for 2 doses. If gets fever >100.5, needs white count, differential, blood culture and strong consideration of rocephin 50-75 mg/kg. Should have CXR for any pulmonary symptoms. All above recomm*       Family History   Problem Relation Age of Onset    Chronic Obstructive Pulmonary Disease Mother          age 50    Hereditary Spherocytosis Mother     Diabetes Type 2  Father     Clotting Disorder Brother  " "       Hereditary Spherocytosis    Hereditary Spherocytosis Brother        Social History     Tobacco Use    Smoking status: Former     Years: 3     Types: Cigarettes     Quit date: 7/15/2020     Years since quitting: 3.7     Passive exposure: Never    Smokeless tobacco: Never    Tobacco comments:     2 to 3 cigs a day.   Vaping Use    Vaping Use: Some days   Substance Use Topics    Alcohol use: Not Currently     Comment: Alcoholic Drinks/day: 1 beer every few months     Drug use: Not Currently       escitalopram (LEXAPRO) 20 MG tablet  LORazepam (ATIVAN) 1 MG tablet  ondansetron (ZOFRAN ODT) 4 MG ODT tab          Physical Exam     First Vitals:  Patient Vitals for the past 24 hrs:   BP Temp Pulse Resp SpO2 Height Weight   04/08/24 1727 99/51 -- 72 -- 99 % -- --   04/08/24 1541 107/53 -- 75 -- 100 % -- --   04/08/24 1413 138/81 98.4  F (36.9  C) 79 20 99 % 1.6 m (5' 3\") 81.6 kg (180 lb)         PHYSICAL EXAM    General Appearance:  Alert, cooperative, no distress, appears stated age.  Resting comfortably, nontoxic-appearing.  No active emesis.  HENT: Normocephalic without obvious deformity, atraumatic. Mucous membranes moist. Posterior pharynx is not erythematous or edematous, no exudates. No tonsillar swelling. No uvular swelling or deviation. No abscess or swelling of the floor of the mouth. No tongue protrusion or drooling. No facial or neck swelling. External auditory canals without erythema, edema, or drainage. Tympanic membranes and clear and intact bilaterally without erythema or bulging. Turbinates not erythematous or edematous.   Eyes: Conjunctiva clear, Lids normal. No discharge.   Respiratory: No distress. Lungs clear to ausculation bilaterally. No wheezes, rhonchi or stridor  Cardiovascular: Regular rate and rhythm, no murmur. Normal cap refill. No peripheral edema  GI: Abdomen soft, nontender, normal bowel sounds.  No groin tenderness.  : No CVA tenderness  Musculoskeletal: Moving all extremities. No " gross deformities  Integument: Warm, dry, no rashes or lesions  Neurologic: Alert and orientated x3. No focal deficits.  Psych: Normal mood and affect        Results     LAB:  All pertinent labs reviewed and interpreted  Labs Ordered and Resulted from Time of ED Arrival to Time of ED Departure   ROUTINE UA WITH MICROSCOPIC REFLEX TO CULTURE - Abnormal       Result Value    Color Urine Light Yellow      Appearance Urine Clear      Glucose Urine Negative      Bilirubin Urine Negative      Ketones Urine Negative      Specific Gravity Urine 1.014      Blood Urine Negative      pH Urine 6.5      Protein Albumin Urine Negative      Urobilinogen Urine <2.0      Nitrite Urine Negative      Leukocyte Esterase Urine Negative      Bacteria Urine Few (*)     Mucus Urine Present (*)     RBC Urine <1      WBC Urine 1      Squamous Epithelials Urine 1     COMPREHENSIVE METABOLIC PANEL - Abnormal    Sodium 137      Potassium 4.0      Carbon Dioxide (CO2) 23      Anion Gap 10      Urea Nitrogen 7.1      Creatinine 0.56      GFR Estimate >90      Calcium 8.3 (*)     Chloride 104      Glucose 94      Alkaline Phosphatase 46      AST 28      ALT 15      Protein Total 7.4      Albumin 4.0      Bilirubin Total 0.7     HCG QUANTITATIVE PREGNANCY - Abnormal    hCG Quantitative 23,996 (*)    CBC WITH PLATELETS AND DIFFERENTIAL - Abnormal    WBC Count 7.0      RBC Count 4.01      Hemoglobin 12.4      Hematocrit 35.1      MCV 88      MCH 30.9      MCHC 35.3      RDW 13.7      Platelet Count 457 (*)     % Neutrophils 61      % Lymphocytes 30      % Monocytes 7      % Eosinophils 1      % Basophils 0      % Immature Granulocytes 0      NRBCs per 100 WBC 0      Absolute Neutrophils 4.3      Absolute Lymphocytes 2.1      Absolute Monocytes 0.5      Absolute Eosinophils 0.1      Absolute Basophils 0.0      Absolute Immature Granulocytes 0.0      Absolute NRBCs 0.0     MAGNESIUM - Normal    Magnesium 2.2         RADIOLOGY:  No orders to display          ECG:  N/A      PROCEDURES:  N/A      Shira Gomes PA-C   Emergency Medicine   [facility] EMERGENCY ROOM       Shira Gomes PA-C  04/08/24 5543

## 2024-04-08 NOTE — DISCHARGE INSTRUCTIONS
You were seen in the emergency department today for hyperemesis.  Your lab work is all unremarkable, no evidence of severe dehydration or electrolyte abnormalities.  Be sure to push fluids at home, including replacing electrolytes with drink such as Pedialyte and liquid IV.  You can continue to use your Zofran at home to control nausea.  I recommend calling your OB/GYN's office tomorrow morning to schedule follow-up in clinic and establish care for this pregnancy, especially if you have had issue with hyperemesis in the past    Return to the emergency department if you develop intractable nausea or vomiting, high fevers, vaginal bleeding/spotting, or abdominal cramping

## 2024-07-06 ENCOUNTER — HEALTH MAINTENANCE LETTER (OUTPATIENT)
Age: 29
End: 2024-07-06

## 2024-08-07 ENCOUNTER — MYC REFILL (OUTPATIENT)
Dept: FAMILY MEDICINE | Facility: CLINIC | Age: 29
End: 2024-08-07

## 2024-08-07 DIAGNOSIS — F41.1 GAD (GENERALIZED ANXIETY DISORDER): ICD-10-CM

## 2024-08-07 RX ORDER — LORAZEPAM 1 MG/1
1-2 TABLET ORAL 2 TIMES DAILY PRN
Qty: 30 TABLET | Refills: 0 | Status: SHIPPED | OUTPATIENT
Start: 2024-08-07 | End: 2024-09-10

## 2024-09-03 NOTE — PROGRESS NOTES
Pt hooked up with event monitor at  outpatient Cardiology department on 10/22/19.  Shortly after, [employer] experienced a system-wide IT issue and as a result, pt's event monitor was not activated.  [employer] called pt, explained what happened and offered to have the patient start wearing the monitor now or offered to credit the monitor.  Pt decided to credit monitor-didn't want to wear the Patch again.  I am unlinking the order from our appointment and [employer] will credit the monitor. DH   Quality 431: Preventive Care And Screening: Unhealthy Alcohol Use - Screening: Patient not identified as an unhealthy alcohol user when screened for unhealthy alcohol use using a systematic screening method Quality 130: Documentation Of Current Medications In The Medical Record: Current Medications Documented Quality 226: Preventive Care And Screening: Tobacco Use: Screening And Cessation Intervention: Patient screened for tobacco use and is an ex/non-smoker Detail Level: Detailed

## 2024-09-10 ENCOUNTER — VIRTUAL VISIT (OUTPATIENT)
Dept: FAMILY MEDICINE | Facility: CLINIC | Age: 29
End: 2024-09-10

## 2024-09-10 DIAGNOSIS — F41.1 GAD (GENERALIZED ANXIETY DISORDER): ICD-10-CM

## 2024-09-10 PROCEDURE — 99213 OFFICE O/P EST LOW 20 MIN: CPT | Mod: 95 | Performed by: FAMILY MEDICINE

## 2024-09-10 RX ORDER — LORAZEPAM 1 MG/1
1-2 TABLET ORAL 2 TIMES DAILY PRN
Qty: 30 TABLET | Refills: 2 | Status: SHIPPED | OUTPATIENT
Start: 2024-09-10

## 2024-09-10 RX ORDER — ESCITALOPRAM OXALATE 20 MG/1
20 TABLET ORAL DAILY
Qty: 90 TABLET | Refills: 3 | Status: SHIPPED | OUTPATIENT
Start: 2024-09-10

## 2024-09-10 ASSESSMENT — PATIENT HEALTH QUESTIONNAIRE - PHQ9
10. IF YOU CHECKED OFF ANY PROBLEMS, HOW DIFFICULT HAVE THESE PROBLEMS MADE IT FOR YOU TO DO YOUR WORK, TAKE CARE OF THINGS AT HOME, OR GET ALONG WITH OTHER PEOPLE: SOMEWHAT DIFFICULT
SUM OF ALL RESPONSES TO PHQ QUESTIONS 1-9: 11
SUM OF ALL RESPONSES TO PHQ QUESTIONS 1-9: 11

## 2024-09-10 ASSESSMENT — ANXIETY QUESTIONNAIRES
GAD7 TOTAL SCORE: 13
GAD7 TOTAL SCORE: 13
7. FEELING AFRAID AS IF SOMETHING AWFUL MIGHT HAPPEN: SEVERAL DAYS
GAD7 TOTAL SCORE: 13
8. IF YOU CHECKED OFF ANY PROBLEMS, HOW DIFFICULT HAVE THESE MADE IT FOR YOU TO DO YOUR WORK, TAKE CARE OF THINGS AT HOME, OR GET ALONG WITH OTHER PEOPLE?: SOMEWHAT DIFFICULT

## 2024-09-10 NOTE — PROGRESS NOTES
"Tonya is a 28 year old who is being evaluated via a billable video visit.          Assessment & Plan     ZION (generalized anxiety disorder)  -Patient is doing well at this time with her current medications.  She will continue on them with sparing use of lorazepam for now.  She will plan on following up with me in approximately 6 months for her next medication check, she will call me sooner as necessary.  -Is considering upcoming pregnancy, does get severe hyperemesis gravidarum, will reach out if she does get pregnant prior to this upcoming spring for in-home IV fluids and Zofran.  - LORazepam (ATIVAN) 1 MG tablet  Dispense: 30 tablet; Refill: 2  - escitalopram (LEXAPRO) 20 MG tablet  Dispense: 90 tablet; Refill: 3      BMI  Estimated body mass index is 31.89 kg/m  as calculated from the following:    Height as of 4/8/24: 1.6 m (5' 3\").    Weight as of 4/8/24: 81.6 kg (180 lb).   Weight management plan: not addressed today          Lucia Castaneda is a 28 year old, presenting for the following health issues:  Recheck Medication        9/10/2024     1:09 PM   Additional Questions   Roomed by Neeru     History of Present Illness       Mental Health Follow-up:  Patient presents to follow-up on Depression & Anxiety.Patient's depression since last visit has been:  Better  The patient is not having other symptoms associated with depression.  Patient's anxiety since last visit has been:  Medium  The patient is not having other symptoms associated with anxiety.  Any significant life events: grief or loss  Patient is feeling anxious or having panic attacks.  Patient has no concerns about alcohol or drug use.    She eats 2-3 servings of fruits and vegetables daily.She consumes 1 sweetened beverage(s) daily.She exercises with enough effort to increase her heart rate 20 to 29 minutes per day.  She exercises with enough effort to increase her heart rate 4 days per week. She is missing 2 dose(s) of medications per " week.  She is not taking prescribed medications regularly due to remembering to take.       She is doing well.     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     She does find that she is able to limit her lorazepam use well.     She does think that the spring would be a great time to come in.           Objective           Vitals:  No vitals were obtained today due to virtual visit.    Physical Exam   GENERAL: alert and no distress  EYES: Eyes grossly normal to inspection.  No discharge or erythema, or obvious scleral/conjunctival abnormalities.  RESP: No audible wheeze, cough, or visible cyanosis.    SKIN: Visible skin clear. No significant rash, abnormal pigmentation or lesions.  NEURO: Cranial nerves grossly intact.  Mentation and speech appropriate for age.  PSYCH: Appropriate affect, tone, and pace of words          Video-Visit Details    Type of service:  Video Visit   Originating Location (pt. Location): Home    Distant Location (provider location):  On-site  Platform used for Video Visit: Deb  Signed Electronically by: Michelle Medina MD

## 2025-01-12 NOTE — PROGRESS NOTES
Tonya Thayer is a 21-year-old who presents today with a 2 day history of not feeling well.  This started off with a headache sore throat fever and chills and now right ear pain.  She is not sure about exposures.  We reviewed her recent hospitalizations for pyelonephritis.  She is not having any dysuria or hematuria.    She thinks her eyes are little bit more yellow than they used to be and we discussed likely causes of jaundice.  She has had nausea with one episode of vomiting as well as some epigastric abdominal discomfort.  She has had no other GI or  symptoms.  No other known exposures.  She does have a history of spherocytosis and is asplenic    Objective:/80  Pulse 90  Temp 98.1  F (36.7  C)  Wt 130 lb 6.4 oz (59.1 kg)  BMI 23.85 kg/m2  Patient appears flushed as if she has a fever  Ears are normal nose is congested mouth has some mild erythema neck is supple without lymphadenopathy lungs are clear heart with regular rate and rhythm without a murmur abdomen is soft bowel sounds positive there is some mild epigastric discomfort without rebound guarding or mass back is nontender no CVA tenderness skin appears normal    Labs-Strep negative other labs pending    Assessment: Fever with pharyngitis abdominal pain?  Jaundice uncertain etiology    Plan: recommended fluids Tylenol as needed good handwashing we will call with labs when available and she will follow-up here otherwise as needed    
actual/standing

## 2025-04-17 ENCOUNTER — MYC MEDICAL ADVICE (OUTPATIENT)
Dept: FAMILY MEDICINE | Facility: CLINIC | Age: 30
End: 2025-04-17

## 2025-04-27 DIAGNOSIS — F41.1 GAD (GENERALIZED ANXIETY DISORDER): ICD-10-CM

## 2025-04-28 RX ORDER — LORAZEPAM 1 MG/1
1-2 TABLET ORAL 2 TIMES DAILY PRN
Qty: 30 TABLET | Refills: 0 | Status: SHIPPED | OUTPATIENT
Start: 2025-04-28

## 2025-05-06 ENCOUNTER — VIRTUAL VISIT (OUTPATIENT)
Dept: FAMILY MEDICINE | Facility: CLINIC | Age: 30
End: 2025-05-06

## 2025-05-06 DIAGNOSIS — F41.1 GAD (GENERALIZED ANXIETY DISORDER): Primary | ICD-10-CM

## 2025-05-06 PROCEDURE — 98005 SYNCH AUDIO-VIDEO EST LOW 20: CPT | Performed by: FAMILY MEDICINE

## 2025-05-06 ASSESSMENT — ANXIETY QUESTIONNAIRES
GAD7 TOTAL SCORE: 14
7. FEELING AFRAID AS IF SOMETHING AWFUL MIGHT HAPPEN: MORE THAN HALF THE DAYS
IF YOU CHECKED OFF ANY PROBLEMS ON THIS QUESTIONNAIRE, HOW DIFFICULT HAVE THESE PROBLEMS MADE IT FOR YOU TO DO YOUR WORK, TAKE CARE OF THINGS AT HOME, OR GET ALONG WITH OTHER PEOPLE: VERY DIFFICULT
7. FEELING AFRAID AS IF SOMETHING AWFUL MIGHT HAPPEN: MORE THAN HALF THE DAYS
GAD7 TOTAL SCORE: 14
4. TROUBLE RELAXING: MORE THAN HALF THE DAYS
GAD7 TOTAL SCORE: 14
6. BECOMING EASILY ANNOYED OR IRRITABLE: MORE THAN HALF THE DAYS
1. FEELING NERVOUS, ANXIOUS, OR ON EDGE: MORE THAN HALF THE DAYS
5. BEING SO RESTLESS THAT IT IS HARD TO SIT STILL: SEVERAL DAYS
3. WORRYING TOO MUCH ABOUT DIFFERENT THINGS: MORE THAN HALF THE DAYS
2. NOT BEING ABLE TO STOP OR CONTROL WORRYING: NEARLY EVERY DAY
8. IF YOU CHECKED OFF ANY PROBLEMS, HOW DIFFICULT HAVE THESE MADE IT FOR YOU TO DO YOUR WORK, TAKE CARE OF THINGS AT HOME, OR GET ALONG WITH OTHER PEOPLE?: VERY DIFFICULT

## 2025-05-06 ASSESSMENT — PATIENT HEALTH QUESTIONNAIRE - PHQ9
SUM OF ALL RESPONSES TO PHQ QUESTIONS 1-9: 9
10. IF YOU CHECKED OFF ANY PROBLEMS, HOW DIFFICULT HAVE THESE PROBLEMS MADE IT FOR YOU TO DO YOUR WORK, TAKE CARE OF THINGS AT HOME, OR GET ALONG WITH OTHER PEOPLE: SOMEWHAT DIFFICULT
SUM OF ALL RESPONSES TO PHQ QUESTIONS 1-9: 9

## 2025-05-06 NOTE — PROGRESS NOTES
Tonya is a 29 year old who is being evaluated via a billable video visit.    How would you like to obtain your AVS? Balahart  If the video visit is dropped, the invitation should be resent by: Text to cell phone: [phone]  Will anyone else be joining your video visit? No      Assessment & Plan     ZION (generalized anxiety disorder)  - Really doing quite well at this time.  She should have refills on her Lexapro for the next over the next year.  Lorazepam for use as been quite sparing, again okay for refills in the next fill.  She is due for health maintenance visit and will work on getting the link convenient      Subjective   Tonya is a 29 year old, presenting for the following health issues:  Recheck Medication        2025     8:41 AM   Additional Questions   Roomed by ZANE Urias     Video Start Time: 8:56 AM    History of Present Illness       Mental Health Follow-up:  Patient presents to follow-up on Anxiety.    Patient's anxiety since last visit has been:  Medium  The patient is not having other symptoms associated with anxiety.  Any significant life events: grief or loss  Patient is feeling anxious or having panic attacks.  Patient has no concerns about alcohol or drug use.    She eats 4 or more servings of fruits and vegetables daily.She consumes 0 sweetened beverage(s) daily.She exercises with enough effort to increase her heart rate 60 or more minutes per day.  She exercises with enough effort to increase her heart rate 4 days per week. She is missing 4 dose(s) of medications per week.  She is not taking prescribed medications regularly due to remembering to take and other.        She is doing well for the most part. Does have some issues with remembering to take medications.     Her uncle just  and her daughter's father is not in a good place, so she has her daughter full time again.     The lorazepam use has been pretty minimal. She will take it at night at times to sleep if anxious. She has  cut caffeine out. She does well mainly with good coping strategies.           Objective           Vitals:  No vitals were obtained today due to virtual visit.    Physical Exam   GENERAL: alert and no distress  EYES: Eyes grossly normal to inspection.  No discharge or erythema, or obvious scleral/conjunctival abnormalities.  RESP: No audible wheeze, cough, or visible cyanosis.    SKIN: Visible skin clear. No significant rash, abnormal pigmentation or lesions.  NEURO: Cranial nerves grossly intact.  Mentation and speech appropriate for age.  PSYCH: Appropriate affect, tone, and pace of words          Video-Visit Details    Type of service:  Video Visit   Video End Time:9:10 AM  Originating Location (pt. Location): Home    Distant Location (provider location):  On-site  Platform used for Video Visit: Deb  Signed Electronically by: Michelle Medina MD

## 2025-07-03 DIAGNOSIS — F41.1 GAD (GENERALIZED ANXIETY DISORDER): ICD-10-CM

## 2025-07-03 RX ORDER — LORAZEPAM 1 MG/1
1-2 TABLET ORAL 2 TIMES DAILY PRN
Qty: 30 TABLET | Refills: 0 | Status: SHIPPED | OUTPATIENT
Start: 2025-07-03

## 2025-07-13 ENCOUNTER — HEALTH MAINTENANCE LETTER (OUTPATIENT)
Age: 30
End: 2025-07-13